# Patient Record
Sex: FEMALE | Race: WHITE | NOT HISPANIC OR LATINO | URBAN - METROPOLITAN AREA
[De-identification: names, ages, dates, MRNs, and addresses within clinical notes are randomized per-mention and may not be internally consistent; named-entity substitution may affect disease eponyms.]

---

## 2017-01-03 DIAGNOSIS — N18.6 END STAGE RENAL DISEASE (H): ICD-10-CM

## 2017-01-03 DIAGNOSIS — Z01.810 PRE-OPERATIVE CARDIOVASCULAR EXAMINATION: ICD-10-CM

## 2017-01-03 DIAGNOSIS — Z76.82 ORGAN TRANSPLANT CANDIDATE: Primary | ICD-10-CM

## 2017-01-04 DIAGNOSIS — Z76.82 ORGAN TRANSPLANT CANDIDATE: ICD-10-CM

## 2017-01-10 LAB — PRA SINGLE ANTIGEN IGG ANTIBODY: NORMAL

## 2017-01-25 ENCOUNTER — TELEPHONE (OUTPATIENT)
Dept: TRANSPLANT | Facility: CLINIC | Age: 52
End: 2017-01-25

## 2017-01-25 NOTE — Clinical Note
January 25, 2017    Angelica Romero  [address] [place] [address]      Dear Ms. Romero,    Enclosed you will find a copy of your transplant waitlist appointment schedule and a map to our location.    If you are unable to come in for your appointments for any reason, please contact your Transplant Coordinator or Transplant ; numbers for both are on your schedule.      Sincerely,       [facility]    CC: WALDEMAR Morin, LANCE                                        [facility]  [address] [place] [address]    WAITLIST CLINIC APPOINTMENTS    Patient   Angelica Romero  MR#:    [id number]  Coordinator:  Smita RADFORD     [phone]  BRIEN:    Yulissa VILLATORO     [phone]  :  Sonam     [phone]  Location:   Transplant Center  Dates:   April 13, 2017      Day/Date:  Thursday, April 13, 2017  Time Location Activity   11:00 a.m. Transplant Center Consult Room  (3rd Floor [facility]) Appointment with Samantha Markham,  Transplant    12:00 p.m. Cardiovascular Center  (3rd Floor [facility]) Appointment with Dr. Bazan,  Cardiology   1:30 p.m. [facility] Waiting Room  (2nd floor [facility]) Lexiscan Stress Test - NPO 3 HOURS and NO CAFFINE 12 HOURS, please read attach prep sheet!

## 2017-02-07 ENCOUNTER — ORGAN (OUTPATIENT)
Dept: TRANSPLANT | Facility: CLINIC | Age: 52
End: 2017-02-07

## 2017-02-07 ENCOUNTER — RESULTS ONLY (OUTPATIENT)
Dept: OTHER | Facility: CLINIC | Age: 52
End: 2017-02-07

## 2017-02-07 ENCOUNTER — DOCUMENTATION ONLY (OUTPATIENT)
Dept: TRANSPLANT | Facility: CLINIC | Age: 52
End: 2017-02-07

## 2017-02-07 ENCOUNTER — HOSPITAL ENCOUNTER (OUTPATIENT)
Facility: CLINIC | Age: 52
Setting detail: SPECIMEN
DRG: 652 | End: 2017-02-07
Attending: TRANSPLANT SURGERY | Admitting: SURGERY
Payer: MEDICARE

## 2017-02-07 DIAGNOSIS — Z76.82 AWAITING ORGAN TRANSPLANT: Primary | ICD-10-CM

## 2017-02-07 PROCEDURE — 86832 HLA CLASS I HIGH DEFIN QUAL: CPT | Performed by: TRANSPLANT SURGERY

## 2017-02-07 PROCEDURE — 86825 HLA X-MATH NON-CYTOTOXIC: CPT | Performed by: TRANSPLANT SURGERY

## 2017-02-07 PROCEDURE — 86833 HLA CLASS II HIGH DEFIN QUAL: CPT | Performed by: TRANSPLANT SURGERY

## 2017-02-07 NOTE — Clinical Note
Pt likely to become primary. Final allo XM negative. Patient to be NPO after midnight. Donor OR 0300. Will call patient with update if she becomes primary. Read note for more details. Thx!

## 2017-02-07 NOTE — PROGRESS NOTES
PATHOLOGY HLA CROSSMATCH CONSULTATION: DONOR/RECIPIENT VIRTUAL CROSSMATCH - Kidney  Consultation Date: 2017  Consultation Requested by: Dr. Benites  Regarding: Compatibility of  donor organ UNOS #[id number]  from OPO/Hospital: [facility], [place] with patient Angelica Romero       Findings: Regarding a virtual crossmatch between Angelica Romero and  donor listed above (match ID [id number]):  The most recent and peak patient sera were analyzed.  The patient has 2 donor-specific antibody(ies)  (DSA) as listed in table below. No other antibodies listed with specificity against donor organ.      ANTIBODY MOST RECENT SERUM (mfi) 12.17.16 Peak Serum #1 (mfi)  1.6.15     A*11:02 55025 67120   DRB1*12:02 allele specific 81106  -     Donor is , A*11:02 is an  antigen. The antibodies against A11 can be real. A*11:02 bead is also known to give false positive results in the antibody assay. In this patient virtual crossmatch cannot determine if this antibody is real or not. However, crossmatch assay will help to deteremine this difference.    Record Review Indicates: I personally reviewed the most recent serum and the  peak serum/sera.  In addition, I analyzed 8 more sera:  The patient has antibodies against the donor organ.   Based on historical data from this [facility]'s histocompatibility lab, using the sum mfi of the patient's DSA to antibodies with specificity against this donor organ, the probability of a positive B cell crossmatch is  83% for the most recent serum and for the peak serum.  DSA to C-locus antigens were not considered in deriving the probability of positive crossmatch because DSA to C-locus antigens rarely contribute to a positive lymphocyte crossmatch test.    The results of this virtual XM are:   -most recent serum: Perform a physical crossmatch to determine compatibility  -peak #1:  Perform a physical crossmatch to determine compatibility    Disclaimer: Clinical judgement  must take into account other factors, such as non-HLA antibodies not detected in the assay.   The VXM gives probabilities only.  The probability does not account for the potential for auto-antibodies that may be present in the patient's serum.  These autoantibodies may render the physical crossmatch falsely positive, and would be detected by an autologous crossmatch.  When possible, confirm findings with a prospective allogeneic and autologous flow crossmatches before going to transplant.    Lennie Molina MD  Medical Director, Immunology/Histocompatibility Laboratory

## 2017-02-08 ENCOUNTER — ANESTHESIA EVENT (OUTPATIENT)
Dept: SURGERY | Facility: CLINIC | Age: 52
DRG: 652 | End: 2017-02-08
Payer: MEDICARE

## 2017-02-08 ENCOUNTER — ANESTHESIA (OUTPATIENT)
Dept: SURGERY | Facility: CLINIC | Age: 52
DRG: 652 | End: 2017-02-08
Payer: MEDICARE

## 2017-02-08 ENCOUNTER — APPOINTMENT (OUTPATIENT)
Dept: GENERAL RADIOLOGY | Facility: CLINIC | Age: 52
DRG: 652 | End: 2017-02-08
Attending: ANESTHESIOLOGY
Payer: MEDICARE

## 2017-02-08 ENCOUNTER — HOSPITAL ENCOUNTER (INPATIENT)
Facility: CLINIC | Age: 52
LOS: 3 days | Discharge: HOME HEALTH | DRG: 652 | End: 2017-02-11
Attending: SURGERY | Admitting: SURGERY
Payer: MEDICARE

## 2017-02-08 ENCOUNTER — SURGERY (OUTPATIENT)
Age: 52
End: 2017-02-08

## 2017-02-08 ENCOUNTER — APPOINTMENT (OUTPATIENT)
Dept: GENERAL RADIOLOGY | Facility: CLINIC | Age: 52
DRG: 652 | End: 2017-02-08
Attending: PHYSICIAN ASSISTANT
Payer: MEDICARE

## 2017-02-08 DIAGNOSIS — Z22.7 LATENT TUBERCULOSIS BY BLOOD TEST: ICD-10-CM

## 2017-02-08 DIAGNOSIS — Z94.0 DECEASED-DONOR KIDNEY TRANSPLANT: Primary | ICD-10-CM

## 2017-02-08 DIAGNOSIS — I10 ESSENTIAL HYPERTENSION: ICD-10-CM

## 2017-02-08 LAB
ABO + RH BLD: NORMAL
ABO + RH BLD: NORMAL
ALBUMIN SERPL-MCNC: 3.6 G/DL (ref 3.4–5)
ALP SERPL-CCNC: 56 U/L (ref 40–150)
ALT SERPL W P-5'-P-CCNC: 20 U/L (ref 0–50)
ANION GAP SERPL CALCULATED.3IONS-SCNC: 13 MMOL/L (ref 3–14)
ANION GAP SERPL CALCULATED.3IONS-SCNC: 9 MMOL/L (ref 3–14)
APTT PPP: 28 SEC (ref 22–37)
AST SERPL W P-5'-P-CCNC: 11 U/L (ref 0–45)
BASE EXCESS BLDA CALC-SCNC: 1.5 MMOL/L
BASOPHILS # BLD AUTO: 0 10E9/L (ref 0–0.2)
BASOPHILS NFR BLD AUTO: 0.3 %
BILIRUB SERPL-MCNC: 0.3 MG/DL (ref 0.2–1.3)
BLD GP AB SCN SERPL QL: NORMAL
BLD PROD TYP BPU: NORMAL
BLOOD BANK CMNT PATIENT-IMP: NORMAL
BUN SERPL-MCNC: 21 MG/DL (ref 7–30)
BUN SERPL-MCNC: 26 MG/DL (ref 7–30)
CA-I BLD-MCNC: 4.5 MG/DL (ref 4.4–5.2)
CALCIUM SERPL-MCNC: 7.8 MG/DL (ref 8.5–10.1)
CALCIUM SERPL-MCNC: 9.4 MG/DL (ref 8.5–10.1)
CHLORIDE SERPL-SCNC: 102 MMOL/L (ref 94–109)
CHLORIDE SERPL-SCNC: 106 MMOL/L (ref 94–109)
CHOLEST SERPL-MCNC: 231 MG/DL
CO2 SERPL-SCNC: 22 MMOL/L (ref 20–32)
CO2 SERPL-SCNC: 30 MMOL/L (ref 20–32)
CREAT SERPL-MCNC: 4.89 MG/DL (ref 0.52–1.04)
CREAT SERPL-MCNC: 5.59 MG/DL (ref 0.52–1.04)
DIFFERENTIAL METHOD BLD: ABNORMAL
EOSINOPHIL # BLD AUTO: 0.1 10E9/L (ref 0–0.7)
EOSINOPHIL NFR BLD AUTO: 1.8 %
ERYTHROCYTE [DISTWIDTH] IN BLOOD BY AUTOMATED COUNT: 14 % (ref 10–15)
ERYTHROCYTE [DISTWIDTH] IN BLOOD BY AUTOMATED COUNT: 14.4 % (ref 10–15)
GFR SERPL CREATININE-BSD FRML MDRD: 8 ML/MIN/1.7M2
GFR SERPL CREATININE-BSD FRML MDRD: 9 ML/MIN/1.7M2
GLUCOSE BLD-MCNC: 111 MG/DL (ref 70–99)
GLUCOSE SERPL-MCNC: 122 MG/DL (ref 70–99)
GLUCOSE SERPL-MCNC: 76 MG/DL (ref 70–99)
HBA1C MFR BLD: 4 % (ref 4.3–6)
HBV CORE IGM SERPL QL IA: NORMAL
HBV SURFACE AG SERPL QL IA: NONREACTIVE
HCO3 BLD-SCNC: 26 MMOL/L (ref 21–28)
HCT VFR BLD AUTO: 31 % (ref 35–47)
HCT VFR BLD AUTO: 34 % (ref 35–47)
HCV AB SERPL QL IA: NORMAL
HDLC SERPL-MCNC: 49 MG/DL
HGB BLD-MCNC: 10.3 G/DL (ref 11.7–15.7)
HGB BLD-MCNC: 10.3 G/DL (ref 11.7–15.7)
HGB BLD-MCNC: 11.4 G/DL (ref 11.7–15.7)
HIV 1+2 AB+HIV1 P24 AG SERPL QL IA: NORMAL
HLA FINAL CROSSMATCH RECIPIENT: NORMAL
IMM GRANULOCYTES # BLD: 0 10E9/L (ref 0–0.4)
IMM GRANULOCYTES NFR BLD: 0.1 %
INR PPP: 1.13 (ref 0.86–1.14)
LDLC SERPL CALC-MCNC: 111 MG/DL
LYMPHOCYTES # BLD AUTO: 1.1 10E9/L (ref 0.8–5.3)
LYMPHOCYTES NFR BLD AUTO: 16 %
MAGNESIUM SERPL-MCNC: 1.6 MG/DL (ref 1.6–2.3)
MCH RBC QN AUTO: 32.8 PG (ref 26.5–33)
MCH RBC QN AUTO: 33.1 PG (ref 26.5–33)
MCHC RBC AUTO-ENTMCNC: 33.2 G/DL (ref 31.5–36.5)
MCHC RBC AUTO-ENTMCNC: 33.5 G/DL (ref 31.5–36.5)
MCV RBC AUTO: 100 FL (ref 78–100)
MCV RBC AUTO: 98 FL (ref 78–100)
MONOCYTES # BLD AUTO: 0.5 10E9/L (ref 0–1.3)
MONOCYTES NFR BLD AUTO: 6.7 %
NEUTROPHILS # BLD AUTO: 5.1 10E9/L (ref 1.6–8.3)
NEUTROPHILS NFR BLD AUTO: 75.1 %
NONHDLC SERPL-MCNC: 182 MG/DL
NRBC # BLD AUTO: 0.1 10*3/UL
NRBC BLD AUTO-RTO: 1 /100
NUM BPU REQUESTED: 2
O2/TOTAL GAS SETTING VFR VENT: 50 %
PCO2 BLD: 37 MM HG (ref 35–45)
PH BLD: 7.45 PH (ref 7.35–7.45)
PHOSPHATE SERPL-MCNC: 3.9 MG/DL (ref 2.5–4.5)
PLATELET # BLD AUTO: 150 10E9/L (ref 150–450)
PLATELET # BLD AUTO: 289 10E9/L (ref 150–450)
PO2 BLD: 115 MM HG (ref 80–105)
POTASSIUM BLD-SCNC: 3.8 MMOL/L (ref 3.4–5.3)
POTASSIUM SERPL-SCNC: 3.7 MMOL/L (ref 3.4–5.3)
POTASSIUM SERPL-SCNC: 4.2 MMOL/L (ref 3.4–5.3)
PROT SERPL-MCNC: 7.5 G/DL (ref 6.8–8.8)
RBC # BLD AUTO: 3.11 10E12/L (ref 3.8–5.2)
RBC # BLD AUTO: 3.48 10E12/L (ref 3.8–5.2)
SODIUM BLD-SCNC: 142 MMOL/L (ref 133–144)
SODIUM SERPL-SCNC: 140 MMOL/L (ref 133–144)
SODIUM SERPL-SCNC: 141 MMOL/L (ref 133–144)
SPECIMEN EXP DATE BLD: NORMAL
TRIGL SERPL-MCNC: 356 MG/DL
WBC # BLD AUTO: 3.6 10E9/L (ref 4–11)
WBC # BLD AUTO: 6.8 10E9/L (ref 4–11)

## 2017-02-08 PROCEDURE — 36000064 ZZH SURGERY LEVEL 4 EA 15 ADDTL MIN - UMMC: Performed by: SURGERY

## 2017-02-08 PROCEDURE — 25000125 ZZHC RX 250: Performed by: SURGERY

## 2017-02-08 PROCEDURE — 84295 ASSAY OF SERUM SODIUM: CPT | Performed by: SURGERY

## 2017-02-08 PROCEDURE — 82803 BLOOD GASES ANY COMBINATION: CPT | Performed by: SURGERY

## 2017-02-08 PROCEDURE — 86665 EPSTEIN-BARR CAPSID VCA: CPT | Performed by: PHYSICIAN ASSISTANT

## 2017-02-08 PROCEDURE — 82330 ASSAY OF CALCIUM: CPT | Performed by: SURGERY

## 2017-02-08 PROCEDURE — 80061 LIPID PANEL: CPT | Performed by: PHYSICIAN ASSISTANT

## 2017-02-08 PROCEDURE — 40000275 ZZH STATISTIC RCP TIME EA 10 MIN

## 2017-02-08 PROCEDURE — 86803 HEPATITIS C AB TEST: CPT | Performed by: PHYSICIAN ASSISTANT

## 2017-02-08 PROCEDURE — 25000565 ZZH ISOFLURANE, EA 15 MIN: Performed by: SURGERY

## 2017-02-08 PROCEDURE — 12000006 ZZH R&B IMCU INTERMEDIATE UMMC

## 2017-02-08 PROCEDURE — 86900 BLOOD TYPING SEROLOGIC ABO: CPT | Performed by: PHYSICIAN ASSISTANT

## 2017-02-08 PROCEDURE — 27210794 ZZH OR GENERAL SUPPLY STERILE: Performed by: SURGERY

## 2017-02-08 PROCEDURE — 40000940 XR CHEST PORT 1 VW

## 2017-02-08 PROCEDURE — 86850 RBC ANTIBODY SCREEN: CPT | Performed by: PHYSICIAN ASSISTANT

## 2017-02-08 PROCEDURE — 71000014 ZZH RECOVERY PHASE 1 LEVEL 2 FIRST HR: Performed by: SURGERY

## 2017-02-08 PROCEDURE — 25800025 ZZH RX 258: Performed by: NURSE ANESTHETIST, CERTIFIED REGISTERED

## 2017-02-08 PROCEDURE — 83036 HEMOGLOBIN GLYCOSYLATED A1C: CPT | Performed by: PHYSICIAN ASSISTANT

## 2017-02-08 PROCEDURE — 93010 ELECTROCARDIOGRAM REPORT: CPT | Performed by: INTERNAL MEDICINE

## 2017-02-08 PROCEDURE — 30243S1 TRANSFUSION OF NONAUTOLOGOUS GLOBULIN INTO CENTRAL VEIN, PERCUTANEOUS APPROACH: ICD-10-PCS | Performed by: SURGERY

## 2017-02-08 PROCEDURE — 86644 CMV ANTIBODY: CPT | Performed by: PHYSICIAN ASSISTANT

## 2017-02-08 PROCEDURE — 25000128 H RX IP 250 OP 636: Performed by: NURSE ANESTHETIST, CERTIFIED REGISTERED

## 2017-02-08 PROCEDURE — 82947 ASSAY GLUCOSE BLOOD QUANT: CPT | Performed by: SURGERY

## 2017-02-08 PROCEDURE — 0TY10Z0 TRANSPLANTATION OF LEFT KIDNEY, ALLOGENEIC, OPEN APPROACH: ICD-10-PCS | Performed by: SURGERY

## 2017-02-08 PROCEDURE — 25000125 ZZHC RX 250: Performed by: TRANSPLANT SURGERY

## 2017-02-08 PROCEDURE — 85610 PROTHROMBIN TIME: CPT | Performed by: PHYSICIAN ASSISTANT

## 2017-02-08 PROCEDURE — 87340 HEPATITIS B SURFACE AG IA: CPT | Performed by: PHYSICIAN ASSISTANT

## 2017-02-08 PROCEDURE — 86901 BLOOD TYPING SEROLOGIC RH(D): CPT | Performed by: PHYSICIAN ASSISTANT

## 2017-02-08 PROCEDURE — 37000009 ZZH ANESTHESIA TECHNICAL FEE, EACH ADDTL 15 MIN: Performed by: SURGERY

## 2017-02-08 PROCEDURE — 83735 ASSAY OF MAGNESIUM: CPT | Performed by: TRANSPLANT SURGERY

## 2017-02-08 PROCEDURE — 37000008 ZZH ANESTHESIA TECHNICAL FEE, 1ST 30 MIN: Performed by: SURGERY

## 2017-02-08 PROCEDURE — 36415 COLL VENOUS BLD VENIPUNCTURE: CPT | Performed by: PHYSICIAN ASSISTANT

## 2017-02-08 PROCEDURE — 40000196 ZZH STATISTIC RAPCV CVP MONITORING

## 2017-02-08 PROCEDURE — 71020 XR CHEST 2 VW: CPT

## 2017-02-08 PROCEDURE — 85027 COMPLETE CBC AUTOMATED: CPT | Performed by: TRANSPLANT SURGERY

## 2017-02-08 PROCEDURE — 25000125 ZZHC RX 250: Performed by: NURSE ANESTHETIST, CERTIFIED REGISTERED

## 2017-02-08 PROCEDURE — 25000128 H RX IP 250 OP 636: Performed by: STUDENT IN AN ORGANIZED HEALTH CARE EDUCATION/TRAINING PROGRAM

## 2017-02-08 PROCEDURE — 86923 COMPATIBILITY TEST ELECTRIC: CPT | Performed by: PHYSICIAN ASSISTANT

## 2017-02-08 PROCEDURE — 80053 COMPREHEN METABOLIC PANEL: CPT | Performed by: PHYSICIAN ASSISTANT

## 2017-02-08 PROCEDURE — 86705 HEP B CORE ANTIBODY IGM: CPT | Performed by: PHYSICIAN ASSISTANT

## 2017-02-08 PROCEDURE — 86645 CMV ANTIBODY IGM: CPT | Performed by: PHYSICIAN ASSISTANT

## 2017-02-08 PROCEDURE — 71000015 ZZH RECOVERY PHASE 1 LEVEL 2 EA ADDTL HR: Performed by: SURGERY

## 2017-02-08 PROCEDURE — 25000125 ZZHC RX 250: Performed by: PHYSICIAN ASSISTANT

## 2017-02-08 PROCEDURE — 87389 HIV-1 AG W/HIV-1&-2 AB AG IA: CPT | Performed by: PHYSICIAN ASSISTANT

## 2017-02-08 PROCEDURE — 93005 ELECTROCARDIOGRAM TRACING: CPT

## 2017-02-08 PROCEDURE — 85025 COMPLETE CBC W/AUTO DIFF WBC: CPT | Performed by: PHYSICIAN ASSISTANT

## 2017-02-08 PROCEDURE — 81200002 ZZH ACQUISITION KIDNEY CADAVER

## 2017-02-08 PROCEDURE — 80048 BASIC METABOLIC PNL TOTAL CA: CPT | Performed by: TRANSPLANT SURGERY

## 2017-02-08 PROCEDURE — 27210995 ZZH RX 272: Performed by: TRANSPLANT SURGERY

## 2017-02-08 PROCEDURE — 84132 ASSAY OF SERUM POTASSIUM: CPT | Performed by: SURGERY

## 2017-02-08 PROCEDURE — 25000128 H RX IP 250 OP 636: Performed by: TRANSPLANT SURGERY

## 2017-02-08 PROCEDURE — 85730 THROMBOPLASTIN TIME PARTIAL: CPT | Performed by: PHYSICIAN ASSISTANT

## 2017-02-08 PROCEDURE — 84100 ASSAY OF PHOSPHORUS: CPT | Performed by: TRANSPLANT SURGERY

## 2017-02-08 PROCEDURE — 25000128 H RX IP 250 OP 636: Performed by: PHYSICIAN ASSISTANT

## 2017-02-08 PROCEDURE — 36000062 ZZH SURGERY LEVEL 4 1ST 30 MIN - UMMC: Performed by: SURGERY

## 2017-02-08 PROCEDURE — 40000170 ZZH STATISTIC PRE-PROCEDURE ASSESSMENT II: Performed by: SURGERY

## 2017-02-08 RX ORDER — PROPOFOL 10 MG/ML
INJECTION, EMULSION INTRAVENOUS PRN
Status: DISCONTINUED | OUTPATIENT
Start: 2017-02-08 | End: 2017-02-08

## 2017-02-08 RX ORDER — ONDANSETRON 4 MG/1
4 TABLET, ORALLY DISINTEGRATING ORAL EVERY 30 MIN PRN
Status: DISCONTINUED | OUTPATIENT
Start: 2017-02-08 | End: 2017-02-08 | Stop reason: HOSPADM

## 2017-02-08 RX ORDER — NALOXONE HYDROCHLORIDE 0.4 MG/ML
.1-.4 INJECTION, SOLUTION INTRAMUSCULAR; INTRAVENOUS; SUBCUTANEOUS
Status: DISCONTINUED | OUTPATIENT
Start: 2017-02-08 | End: 2017-02-11 | Stop reason: HOSPADM

## 2017-02-08 RX ORDER — SODIUM CHLORIDE, SODIUM LACTATE, POTASSIUM CHLORIDE, CALCIUM CHLORIDE 600; 310; 30; 20 MG/100ML; MG/100ML; MG/100ML; MG/100ML
INJECTION, SOLUTION INTRAVENOUS CONTINUOUS
Status: DISCONTINUED | OUTPATIENT
Start: 2017-02-08 | End: 2017-02-08

## 2017-02-08 RX ORDER — MYCOPHENOLATE MOFETIL 250 MG/1
750 CAPSULE ORAL
Status: DISCONTINUED | OUTPATIENT
Start: 2017-02-09 | End: 2017-02-11 | Stop reason: HOSPADM

## 2017-02-08 RX ORDER — VALGANCICLOVIR 450 MG/1
450 TABLET, FILM COATED ORAL
Status: DISCONTINUED | OUTPATIENT
Start: 2017-02-09 | End: 2017-02-11

## 2017-02-08 RX ORDER — FUROSEMIDE 10 MG/ML
INJECTION INTRAMUSCULAR; INTRAVENOUS PRN
Status: DISCONTINUED | OUTPATIENT
Start: 2017-02-08 | End: 2017-02-08

## 2017-02-08 RX ORDER — ONDANSETRON 2 MG/ML
INJECTION INTRAMUSCULAR; INTRAVENOUS PRN
Status: DISCONTINUED | OUTPATIENT
Start: 2017-02-08 | End: 2017-02-08

## 2017-02-08 RX ORDER — ONDANSETRON 2 MG/ML
4 INJECTION INTRAMUSCULAR; INTRAVENOUS EVERY 30 MIN PRN
Status: DISCONTINUED | OUTPATIENT
Start: 2017-02-08 | End: 2017-02-08 | Stop reason: HOSPADM

## 2017-02-08 RX ORDER — SULFAMETHOXAZOLE AND TRIMETHOPRIM 400; 80 MG/1; MG/1
1 TABLET ORAL DAILY
Status: DISCONTINUED | OUTPATIENT
Start: 2017-02-09 | End: 2017-02-09

## 2017-02-08 RX ORDER — MANNITOL 250 MG/ML
INJECTION, SOLUTION INTRAVENOUS PRN
Status: DISCONTINUED | OUTPATIENT
Start: 2017-02-08 | End: 2017-02-08

## 2017-02-08 RX ORDER — PROCHLORPERAZINE MALEATE 5 MG
5-10 TABLET ORAL EVERY 6 HOURS PRN
Status: DISCONTINUED | OUTPATIENT
Start: 2017-02-08 | End: 2017-02-11 | Stop reason: HOSPADM

## 2017-02-08 RX ORDER — FENTANYL CITRATE 50 UG/ML
25-50 INJECTION, SOLUTION INTRAMUSCULAR; INTRAVENOUS
Status: DISCONTINUED | OUTPATIENT
Start: 2017-02-08 | End: 2017-02-08 | Stop reason: HOSPADM

## 2017-02-08 RX ORDER — FENTANYL CITRATE 50 UG/ML
INJECTION, SOLUTION INTRAMUSCULAR; INTRAVENOUS PRN
Status: DISCONTINUED | OUTPATIENT
Start: 2017-02-08 | End: 2017-02-08

## 2017-02-08 RX ORDER — ONDANSETRON 2 MG/ML
4 INJECTION INTRAMUSCULAR; INTRAVENOUS EVERY 6 HOURS PRN
Status: DISCONTINUED | OUTPATIENT
Start: 2017-02-08 | End: 2017-02-11 | Stop reason: HOSPADM

## 2017-02-08 RX ORDER — ONDANSETRON 2 MG/ML
4 INJECTION INTRAMUSCULAR; INTRAVENOUS EVERY 30 MIN PRN
Status: CANCELLED | OUTPATIENT
Start: 2017-02-08

## 2017-02-08 RX ORDER — GLYCOPYRROLATE 0.2 MG/ML
INJECTION, SOLUTION INTRAMUSCULAR; INTRAVENOUS PRN
Status: DISCONTINUED | OUTPATIENT
Start: 2017-02-08 | End: 2017-02-08

## 2017-02-08 RX ORDER — SODIUM CHLORIDE 9 MG/ML
INJECTION, SOLUTION INTRAVENOUS CONTINUOUS PRN
Status: DISCONTINUED | OUTPATIENT
Start: 2017-02-08 | End: 2017-02-08

## 2017-02-08 RX ORDER — LIDOCAINE HYDROCHLORIDE 20 MG/ML
INJECTION, SOLUTION INFILTRATION; PERINEURAL PRN
Status: DISCONTINUED | OUTPATIENT
Start: 2017-02-08 | End: 2017-02-08

## 2017-02-08 RX ORDER — HYDROMORPHONE HYDROCHLORIDE 1 MG/ML
.3-.5 INJECTION, SOLUTION INTRAMUSCULAR; INTRAVENOUS; SUBCUTANEOUS EVERY 5 MIN PRN
Status: CANCELLED | OUTPATIENT
Start: 2017-02-08

## 2017-02-08 RX ORDER — SODIUM CHLORIDE 9 MG/ML
1000 INJECTION, SOLUTION INTRAVENOUS CONTINUOUS PRN
Status: DISCONTINUED | OUTPATIENT
Start: 2017-02-08 | End: 2017-02-09

## 2017-02-08 RX ORDER — HYDROMORPHONE HYDROCHLORIDE 1 MG/ML
.3-.5 INJECTION, SOLUTION INTRAMUSCULAR; INTRAVENOUS; SUBCUTANEOUS EVERY 5 MIN PRN
Status: DISCONTINUED | OUTPATIENT
Start: 2017-02-08 | End: 2017-02-08 | Stop reason: HOSPADM

## 2017-02-08 RX ORDER — SODIUM CHLORIDE 450 MG/100ML
INJECTION, SOLUTION INTRAVENOUS CONTINUOUS PRN
Status: DISCONTINUED | OUTPATIENT
Start: 2017-02-08 | End: 2017-02-09

## 2017-02-08 RX ORDER — LABETALOL HYDROCHLORIDE 5 MG/ML
10 INJECTION, SOLUTION INTRAVENOUS
Status: DISCONTINUED | OUTPATIENT
Start: 2017-02-08 | End: 2017-02-08 | Stop reason: HOSPADM

## 2017-02-08 RX ORDER — SODIUM CHLORIDE, SODIUM LACTATE, POTASSIUM CHLORIDE, CALCIUM CHLORIDE 600; 310; 30; 20 MG/100ML; MG/100ML; MG/100ML; MG/100ML
INJECTION, SOLUTION INTRAVENOUS CONTINUOUS PRN
Status: DISCONTINUED | OUTPATIENT
Start: 2017-02-08 | End: 2017-02-08

## 2017-02-08 RX ORDER — ONDANSETRON 4 MG/1
4 TABLET, ORALLY DISINTEGRATING ORAL EVERY 6 HOURS PRN
Status: DISCONTINUED | OUTPATIENT
Start: 2017-02-08 | End: 2017-02-11 | Stop reason: HOSPADM

## 2017-02-08 RX ORDER — ONDANSETRON 4 MG/1
4 TABLET, ORALLY DISINTEGRATING ORAL EVERY 30 MIN PRN
Status: CANCELLED | OUTPATIENT
Start: 2017-02-08

## 2017-02-08 RX ORDER — AMOXICILLIN 250 MG
1-2 CAPSULE ORAL 2 TIMES DAILY
Status: DISCONTINUED | OUTPATIENT
Start: 2017-02-09 | End: 2017-02-11 | Stop reason: HOSPADM

## 2017-02-08 RX ORDER — FUROSEMIDE 10 MG/ML
80 INJECTION INTRAMUSCULAR; INTRAVENOUS ONCE
Status: COMPLETED | OUTPATIENT
Start: 2017-02-08 | End: 2017-02-08

## 2017-02-08 RX ORDER — FUROSEMIDE 10 MG/ML
80 INJECTION INTRAMUSCULAR; INTRAVENOUS 2 TIMES DAILY
Status: DISCONTINUED | OUTPATIENT
Start: 2017-02-09 | End: 2017-02-09

## 2017-02-08 RX ORDER — PANTOPRAZOLE SODIUM 40 MG/1
40 TABLET, DELAYED RELEASE ORAL DAILY
Status: DISCONTINUED | OUTPATIENT
Start: 2017-02-09 | End: 2017-02-10

## 2017-02-08 RX ORDER — NEOSTIGMINE METHYLSULFATE 1 MG/ML
VIAL (ML) INJECTION PRN
Status: DISCONTINUED | OUTPATIENT
Start: 2017-02-08 | End: 2017-02-08

## 2017-02-08 RX ORDER — LABETALOL HYDROCHLORIDE 5 MG/ML
INJECTION, SOLUTION INTRAVENOUS PRN
Status: DISCONTINUED | OUTPATIENT
Start: 2017-02-08 | End: 2017-02-08

## 2017-02-08 RX ORDER — SODIUM CHLORIDE, SODIUM LACTATE, POTASSIUM CHLORIDE, CALCIUM CHLORIDE 600; 310; 30; 20 MG/100ML; MG/100ML; MG/100ML; MG/100ML
INJECTION, SOLUTION INTRAVENOUS CONTINUOUS
Status: CANCELLED | OUTPATIENT
Start: 2017-02-08

## 2017-02-08 RX ORDER — FENTANYL CITRATE 50 UG/ML
25-50 INJECTION, SOLUTION INTRAMUSCULAR; INTRAVENOUS
Status: CANCELLED | OUTPATIENT
Start: 2017-02-08

## 2017-02-08 RX ADMIN — FENTANYL CITRATE 100 MCG: 50 INJECTION, SOLUTION INTRAMUSCULAR; INTRAVENOUS at 20:13

## 2017-02-08 RX ADMIN — LIDOCAINE HYDROCHLORIDE 100 MG: 20 INJECTION, SOLUTION INFILTRATION; PERINEURAL at 15:35

## 2017-02-08 RX ADMIN — FENTANYL CITRATE 50 MCG: 50 INJECTION, SOLUTION INTRAMUSCULAR; INTRAVENOUS at 17:00

## 2017-02-08 RX ADMIN — FENTANYL CITRATE 50 MCG: 50 INJECTION, SOLUTION INTRAMUSCULAR; INTRAVENOUS at 19:45

## 2017-02-08 RX ADMIN — SODIUM CHLORIDE: 9 INJECTION, SOLUTION INTRAVENOUS at 15:24

## 2017-02-08 RX ADMIN — SODIUM CHLORIDE 1000 ML: 4.5 INJECTION, SOLUTION INTRAVENOUS at 21:17

## 2017-02-08 RX ADMIN — ROCURONIUM BROMIDE 30 MG: 10 INJECTION INTRAVENOUS at 16:36

## 2017-02-08 RX ADMIN — HYDROMORPHONE HYDROCHLORIDE: 10 INJECTION, SOLUTION INTRAMUSCULAR; INTRAVENOUS; SUBCUTANEOUS at 21:15

## 2017-02-08 RX ADMIN — FENTANYL CITRATE 100 MCG: 50 INJECTION, SOLUTION INTRAMUSCULAR; INTRAVENOUS at 15:35

## 2017-02-08 RX ADMIN — SODIUM CHLORIDE: 9 INJECTION, SOLUTION INTRAVENOUS at 18:33

## 2017-02-08 RX ADMIN — HYDROMORPHONE HYDROCHLORIDE 0.5 MG: 10 INJECTION, SOLUTION INTRAMUSCULAR; INTRAVENOUS; SUBCUTANEOUS at 21:13

## 2017-02-08 RX ADMIN — ROCURONIUM BROMIDE 50 MG: 10 INJECTION INTRAVENOUS at 15:37

## 2017-02-08 RX ADMIN — FUROSEMIDE 80 MG: 10 INJECTION, SOLUTION INTRAVENOUS at 21:05

## 2017-02-08 RX ADMIN — FUROSEMIDE 80 MG: 10 INJECTION, SOLUTION INTRAVENOUS at 18:50

## 2017-02-08 RX ADMIN — Medication 4 MG: at 20:23

## 2017-02-08 RX ADMIN — LABETALOL HYDROCHLORIDE 5 MG: 5 INJECTION, SOLUTION INTRAVENOUS at 19:08

## 2017-02-08 RX ADMIN — HEPARIN SODIUM 50 ML GIVEN: 1000 INJECTION, SOLUTION INTRAVENOUS; SUBCUTANEOUS at 20:05

## 2017-02-08 RX ADMIN — ROCURONIUM BROMIDE 20 MG: 10 INJECTION INTRAVENOUS at 17:54

## 2017-02-08 RX ADMIN — CEFUROXIME 1.5 G: 1.5 INJECTION, POWDER, FOR SOLUTION INTRAVENOUS at 16:00

## 2017-02-08 RX ADMIN — LABETALOL HYDROCHLORIDE 10 MG: 5 INJECTION, SOLUTION INTRAVENOUS at 15:52

## 2017-02-08 RX ADMIN — PROPOFOL 200 MG: 10 INJECTION, EMULSION INTRAVENOUS at 15:35

## 2017-02-08 RX ADMIN — FENTANYL CITRATE 50 MCG: 50 INJECTION, SOLUTION INTRAMUSCULAR; INTRAVENOUS at 18:06

## 2017-02-08 RX ADMIN — LABETALOL HYDROCHLORIDE 10 MG: 5 INJECTION, SOLUTION INTRAVENOUS at 18:26

## 2017-02-08 RX ADMIN — ONDANSETRON 4 MG: 2 INJECTION INTRAMUSCULAR; INTRAVENOUS at 20:23

## 2017-02-08 RX ADMIN — HYDROMORPHONE HYDROCHLORIDE 0.5 MG: 10 INJECTION, SOLUTION INTRAMUSCULAR; INTRAVENOUS; SUBCUTANEOUS at 21:35

## 2017-02-08 RX ADMIN — SODIUM CHLORIDE 1000 ML: 9 INJECTION, SOLUTION INTRAVENOUS at 21:17

## 2017-02-08 RX ADMIN — ROCURONIUM BROMIDE 10 MG: 10 INJECTION INTRAVENOUS at 18:50

## 2017-02-08 RX ADMIN — ROCURONIUM BROMIDE 10 MG: 10 INJECTION INTRAVENOUS at 19:34

## 2017-02-08 RX ADMIN — FENTANYL CITRATE 50 MCG: 50 INJECTION, SOLUTION INTRAMUSCULAR; INTRAVENOUS at 16:57

## 2017-02-08 RX ADMIN — SODIUM CHLORIDE 500 MG: 9 INJECTION, SOLUTION INTRAVENOUS at 16:15

## 2017-02-08 RX ADMIN — LABETALOL HYDROCHLORIDE 10 MG: 5 INJECTION, SOLUTION INTRAVENOUS at 20:14

## 2017-02-08 RX ADMIN — MIDAZOLAM HYDROCHLORIDE 2 MG: 1 INJECTION, SOLUTION INTRAMUSCULAR; INTRAVENOUS at 15:24

## 2017-02-08 RX ADMIN — DEXTROSE AND SODIUM CHLORIDE: 5; 900 INJECTION, SOLUTION INTRAVENOUS at 21:16

## 2017-02-08 RX ADMIN — GLYCOPYRROLATE 0.8 MG: 0.2 INJECTION, SOLUTION INTRAMUSCULAR; INTRAVENOUS at 20:23

## 2017-02-08 RX ADMIN — LABETALOL HYDROCHLORIDE 10 MG: 5 INJECTION, SOLUTION INTRAVENOUS at 19:56

## 2017-02-08 RX ADMIN — FENTANYL CITRATE 50 MCG: 50 INJECTION, SOLUTION INTRAMUSCULAR; INTRAVENOUS at 16:36

## 2017-02-08 RX ADMIN — DEXTROSE MONOHYDRATE 1000 MG: 50 INJECTION, SOLUTION INTRAVENOUS at 16:22

## 2017-02-08 RX ADMIN — ANTI-THYMOCYTE GLOBULIN (RABBIT) 175 MG: 5 INJECTION, POWDER, LYOPHILIZED, FOR SOLUTION INTRAVENOUS at 16:20

## 2017-02-08 RX ADMIN — FENTANYL CITRATE 100 MCG: 50 INJECTION, SOLUTION INTRAMUSCULAR; INTRAVENOUS at 15:52

## 2017-02-08 RX ADMIN — LABETALOL HYDROCHLORIDE 5 MG: 5 INJECTION, SOLUTION INTRAVENOUS at 19:11

## 2017-02-08 RX ADMIN — MANNITOL 25 G: 12.5 INJECTION, SOLUTION INTRAVENOUS at 18:51

## 2017-02-08 ASSESSMENT — ACTIVITIES OF DAILY LIVING (ADL)
BATHING: 0-->INDEPENDENT
COGNITION: 0 - NO COGNITION ISSUES REPORTED
AMBULATION: 0-->INDEPENDENT
FALL_HISTORY_WITHIN_LAST_SIX_MONTHS: NO
RETIRED_EATING: 0-->INDEPENDENT
SWALLOWING: 0-->SWALLOWS FOODS/LIQUIDS WITHOUT DIFFICULTY
TOILETING: 0-->INDEPENDENT
TRANSFERRING: 0-->INDEPENDENT
DRESS: 0-->INDEPENDENT
RETIRED_COMMUNICATION: 0-->UNDERSTANDS/COMMUNICATES WITHOUT DIFFICULTY

## 2017-02-08 NOTE — IP AVS SNAPSHOT
[address] [address]    Phone:  [phone]                                       After Visit Summary   2/8/2017    Angelica Romero    MRN: [id number]           After Visit Summary Signature Page     I have received my discharge instructions, and my questions have been answered. I have discussed any challenges I see with this plan with the nurse or doctor.    ..........................................................................................................................................  Patient/Patient Representative Signature      ..........................................................................................................................................  Patient Representative Print Name and Relationship to Patient    ..................................................               ................................................  Date                                            Time    ..........................................................................................................................................  Reviewed by Signature/Title    ...................................................              ..............................................  Date                                                            Time

## 2017-02-08 NOTE — PLAN OF CARE
Problem: Goal Outcome Summary  Goal: Goal Outcome Summary  Outcome: No Change  Pt admitted this afternoon for a pre-op kidney transplant.  and daughter at bedside. Pre-op checklist and admission started. EKG, labs and CXR done. VAD attempted PIV but was unsuccessful. Pt will need US to place PIV in OR. Consent done. Showered x1. Unable to get urine sample; pt had HD today. Belongings sent to 6B. NKA. HTN. Denies pain. NPO.

## 2017-02-08 NOTE — LETTER
Transition Communication Hand-off for Care Transitions to Next Level of Care Provider    Name: Angelica Romero  MRN #: [id number]  Primary Care Provider: Dimitrios Laboy     Primary Clinic: [address] [address]     Reason for Hospitalization:  ESRD  End stage kidney disease (H)  -donor kidney transplant  Admit Date/Time: 2017 12:58 PM  Discharge Date: Approx 17  Payor Source: Payor: MEDICARE / Plan: MEDICARE / Product Type: Medicare /           Reason for Communication Hand-off Referral:  Kidney Tranplant    Discharge Plan: Pt will stay locally until daily visits to ATC Clinic are completed.  [facility] will provide skilled nursing visits once Pt returns to her home in [place]       Concern for non-adherence with plan of care: No  Follow-up plan:  Future Appointments  Date Time Provider Department Center   3/7/2017 9:40 AM Jose Naranjo MD [facility]   3/30/2017 1:50 PM Bill Tejeda MD [facility]   2017 1:50 PM Bill Tejeda MD [facility]       Any outstanding tests or procedures:        Referrals     Future Labs/Procedures    Home care nursing referral     Comments:    [facility] #[phone]   Fax #[phone]      Pt will stay locally after discharge to go to Transplant Clinic daily, Home Care to start approx 17  Skilled Nursing visits to monitor cardiac and resp status  Monitor hydration, nutrition and bowel status  Monitor healing of incision   Instruct in medications and eval effects  Lab draws [facility] report results to Kidney Transplant Coordinator: Nereida Burnham phone #[phone]   Fax#[phone]    Your provider has ordered home care nursing services. If you have not been contacted within 2 days of your discharge please call the inpatient department phone number at [phone] .            Mack Recommendations:      Cassie Rankin RN  6B Care Coordinator #[phone]

## 2017-02-08 NOTE — ANESTHESIA PROCEDURE NOTES
Central Line Procedure Note  Staff:     Anesthesiologist:  ARIES ARANGO  Location: In OR after induction  Procedure Start/Stop Times:     patient identified, IV checked, site marked, risks and benefits discussed, informed consent, monitors and equipment checked, pre-op evaluation and at physician/surgeon's request      Correct Patient: Yes      Correct Position: Yes      Correct Site: Yes      Correct Procedure: Yes      Correct Laterality:  Yes    Site Marked:  Yes  Line Placement:     Procedure:  Central Line    Insertion laterality:  Right    Insertion site:  Internal Jugular      Maximal Sterile Barriers: All elements of maximal sterile barrier technique followed      (Maximal sterile barriers include:   Sterile gown, Sterile Gloves, Mask, Cap, Whole body draped, hand hygiene and acceptable skin prep).Skin Prep: Chloraprep         Injection Technique:  Ultrasound guided and Seldinger Technique    Sterile Ultrasound Technique:  Sterile probe cover and Sterile gel    Vein evaluated via U/S for patency/adequacy of catheter insertion and is adequate.  Using realtime U/S imaging the vein was punctured, and needle was observed entering vein on U/S      Permanent Image entered into patient's record      Local skin infiltration:  None    Catheter size:  7 Fr, 3 lumen, 20 cm    Catheter length at skin (cm):  15    Cath secured with: suture      Dressing:  Tegaderm and Biopatch    Complications:  None obvious    Blood aspirated all lumens: Yes      All Lumens Flushed: Yes      Verification method:  Placement to be verified post-op    Person verifying:  MDA covering post-op    Arterial Line Procedure Note  Staff:     Anesthesiologist:  ARIES ARANGO    Resident/CRNA:  EDSON JOSE    Arterial line performed by resident/CRNA in presence of a teaching physician    Location: In OR After Induction  Procedure Start/Stop Times:     patient identified      Correct Patient: Yes    Line Placement:      Procedure:  Arterial Line    Insertion Site:  Radial    Insertion laterality:  Right    Skin Prep: Chloraprep      Patient Prep: patient draped, mask, sterile gloves, hat and hand hygiene      Local skin infiltration:  None    Ultrasound Guided?: No      Catheter size:  20 gauge, 12 cm    Cath secured with: suture      Dressing:  Occlusive Gauze    Complications:  None obvious    Arterial waveform: Yes      IBP within 10% of NIBP: Yes    Assessment/Narrative:      Preformed by Sergo Chavez RN, SRNA with WAQAR Arenas and Dr. Gallo present.

## 2017-02-08 NOTE — LETTER
Transition Communication Hand-off for Care Transitions to Next Level of Care Provider    Name: Angelica Romero  MRN #: [id number]  Primary Care Provider: Dimitrios Laboy     Primary Clinic: [address] [address]     Reason for Hospitalization:  Transplant [Z94.9]  Admit Date/Time: 2/8/2017 12:58 PM  Discharge Date: 2/11/17  Payor Source: Payor: MEDICARE / Plan: MEDICARE / Product Type: Medicare /       Reason for Communication Hand-off Referral: Other see dx    Discharge Plan:       Concern for non-adherence with plan of care:   Y/N N  Discharge Needs Assessment:        Follow-up specialty is recommended: Yes    Follow-up plan:  Future Appointments  Date Time Provider Department Center   2/14/2017 7:00 AM [facility] SPEC INFUSION [facility]   2/14/2017 8:00 AM Thony Montes MD [facility]   2/15/2017 7:00 AM UC SPEC INFUSION INPR [facility]   2/15/2017 8:00 AM Thony Montes MD [facility]   3/7/2017 9:40 AM Joes Naranjo MD [facility]   3/30/2017 1:50 PM Bill Tejeda MD [facility]   6/8/2017 1:50 PM Bill Tejeda MD [facility]       Any outstanding tests or procedures:        Referrals     Future Labs/Procedures    Home care nursing referral     Comments:    [facility] #[phone]   Fax #[phone]      Pt will stay locally after discharge to go to Transplant Clinic daily, Home Care to start approx 2/17/17  Skilled Nursing visits to monitor cardiac and resp status  Monitor hydration, nutrition and bowel status  Monitor healing of incision   Instruct in medications and eval effects  Lab draws [facility] report results to Kidney Transplant Coordinator: Nereida Burnham phone #[phone]   Fax#[phone]  Will need urinalysis and urine culture should be drawn 3 days after last ciprofloxacin dose (2/19/2017).    Your provider has ordered home care nursing services. If you have not been contacted within 2 days of your discharge please call the  inpatient department phone number at [phone] .            Mack Recommendations:    Adult [facility] Follow-up and recommended labs and tests       LABS:   CBC, BMP, Mg, Phos and Tacrolimus levels to be drawn daily while in ATC, then every Monday, Wednesday, Friday by home health care nurse if arranged, or at an outpatient lab.   Urinalysis and urine culture should be drawn 3 days after last ciprofloxacin dose (2/19/2017).   You will need a random urine protein level drawn on 2/13/2017.   Liver function tests should be drawn every 1 month starting 3/10/2017 while patient on isoniazid.     FOLLOW UP APPOINTMENTS:   Remember to always bring an updated medication list to all appointments.     An appointment with your transplant surgeon will be scheduled for approximately 2 weeks following discharge from the hospital.  Your transplant surgeon is: Dr. Jose Naranjo M.D., M.S..   You will follow up with transplant nephrology in clinic at 1 and 3 months and then annually.   You will need follow up with Transplant Infectious Disease for latent TB.   Follow up with primary care provider in 4-8 weeks. (Pt to schedule)                Barbara Bal    AVS/Discharge Summary is the source of truth; this is a helpful guide for improved communication of patient story

## 2017-02-08 NOTE — ANESTHESIA PREPROCEDURE EVALUATION
Anesthesia Evaluation     .        ROS/MED HX    ENT/Pulmonary:  - neg pulmonary ROS   (+)VIDA risk factors hypertension, obese, , . .    Neurologic:  - neg neurologic ROS     Cardiovascular:     (+) hypertension----. : . . . :. .       METS/Exercise Tolerance:     Hematologic:  - neg hematologic  ROS       Musculoskeletal:  - neg musculoskeletal ROS       GI/Hepatic:  - neg GI/hepatic ROS       Renal/Genitourinary:     (+) chronic renal disease, type: ESRD, Pt requires dialysis, type: Hemodialysis, Pt has no history of transplant,       Endo:     (+) Obesity, .      Psychiatric:  - neg psychiatric ROS       Infectious Disease:  - neg infectious disease ROS       Malignancy:      - no malignancy   Other:    - neg other ROS         ANESTHESIA PREOP EVALUATION    Procedure: Procedure(s):  Kidney Transplant - Wound Class:     HPI: Angelica Romero is a 51 year old female who is presenting for above stated procedure. She has a history of ESRD on hemodialysis since 2011 (MWF) who has been on the transplant list for some time. She had a cancellation of previous transplant attempts.     PMHx/PSHx/ROS:  Past Medical History   Diagnosis Date     Hypertension      Obesity, unspecified      Hypertensive nephrosclerosis      Cervical dysplasia      End stage kidney disease (H)        Past Surgical History   Procedure Laterality Date     Hysterectomy  02/29/2012     Right breast lumpectomy  2/11/2016     benign       ROS as stated above    Soc Hx:   Social History   Substance Use Topics     Smoking status: Former Smoker -- 0.20 packs/day for 15 years     Types: Cigarettes     Smokeless tobacco: Never Used     Alcohol Use: Not on file       Allergies: No Known Allergies    Meds:   No prescriptions prior to admission       Current Outpatient Prescriptions   Medication Sig Dispense Refill     HYDROCHLOROTHIAZIDE PO        METOPROLOL TARTRATE PO Take 10 mg by mouth       lisinopril (PRINIVIL,ZESTRIL) 10 MG tablet Take 0.5 tablets by  mouth daily.       B Complex-C-Folic Acid (DIALYVITE) TABS Take 1 mg by mouth daily.       polyethylene glycol (MIRALAX) powder Take 17 g by mouth daily as needed.       calcium carbonate (TUMS) 500 MG chewable tablet Take 1 chew tab by mouth 3 times daily (with meals).         Physical Exam:  VS:      , Weight   Wt Readings from Last 2 Encounters:   09/26/16 88.996 kg (196 lb 3.2 oz)   12/13/16 87.2 kg (192 lb 3.9 oz)       Labs:    BMP:  Recent Labs   Lab Test  09/10/16   0428   NA  132*   POTASSIUM  3.7   CHLORIDE  94   CO2  28   BUN  30   CR  6.51*   GLC  100*   GIOVANNA  9.7     LFTs:   Recent Labs   Lab Test  09/10/16   0428   PROTTOTAL  7.6   ALBUMIN  3.8   BILITOTAL  0.6   ALKPHOS  48   AST  12   ALT  21     CBC:   Recent Labs   Lab Test  09/10/16   0428   WBC  10.9   RBC  3.58*   HGB  11.6*   HCT  34.5*   MCV  96   MCH  32.4   MCHC  33.6   RDW  13.5   PLT  249     Coags:  Recent Labs   Lab Test  09/10/16   0428   INR  1.08   PTT  28     NM Stress: 2/2/16  IMPRESSION:  1. Normal myocardial SPECT study with a summed stress score of 1. A  summed stress score of <4 is associated with an annual event rate of  0.3% and 0.5% For myocardial infarction and cardiac death,  respectively (E[place]. Circulation 1998;98:535-43).  2. No inducible ischemia was demonstrated.  3. Normal left ventricular ejection fraction of 76%.  4. No prior study available for comparison..     I have personally reviewed the examination and initial interpretation  and I agree with the findings.     SHANNA SANTIAGO MD             ECHO 1/28/2016  Interpretation Summary  Global and regional left ventricular function is normal with an EF of 55-60%.  Right ventricular function, chamber size, wall motion, and thickness are  normal.  The inferior vena cava was normal in size with preserved respiratory  variability (estimated RA pressure 3 mmHg, normal intravascular volume.)  No pericardial effusion is present.  This study was compared with the study  from 4/11/12. There has been no  change.                 Anesthesia Plan      History & Physical Review  History and physical reviewed and following examination; no interval change.    ASA Status:  3 .    NPO Status:  > 8 hours    Plan for General and ETT with Intravenous induction. Maintenance will be Balanced.    PONV prophylaxis:  Ondansetron (or other 5HT-3) and Other (See comment)  Additional equipment: 2nd IV, Central Line and Arterial Line      Postoperative Care  Postoperative pain management:  IV analgesics and Neuraxial analgesia.      Consents  Anesthetic plan, risks, benefits and alternatives discussed with:  Patient.  Use of blood products discussed: Yes.   Use of blood products discussed with Patient.  .        Plan discussed with Dr. Greene, staff Anesthesiologist     Edgard Lafleur MD  [facility]  [id number]

## 2017-02-08 NOTE — IP AVS SNAPSHOT
MRN:[id number]                      After Visit Summary   2017    Angelica Romero    MRN: [id number]           Thank you!     Thank you for choosing [facility] for your care. Our goal is always to provide you with excellent care. Hearing back from our patients is one way we can continue to improve our services. Please take a few minutes to complete the written survey that you may receive in the mail after you visit with us. Thank you!        Patient Information     Date Of Birth          1965        About your hospital stay     You were admitted on:  2017 You last received care in the:  [address] [facility]    You were discharged on:  2017        Reason for your hospital stay       You had a kidney transplant from a  donor. You did not have a ureteral stent placed during your transplant. You also had a positive Quantiferon Gold (tuberculosis test) from  that we started treating you for latent tuberculosis during this hospitalization.                  Who to Call     For medical emergencies, please call 911.  For non-urgent questions about your medical care, please call your primary care provider or clinic, [phone]  For questions related to your surgery, please call your surgery clinic        Attending Provider     Provider Specialty    Jose Naranjo MD Transplant       Primary Care Provider Office Phone # Fax #    Harry Laboy [phone]       [address] [address]        After Care Instructions     Activity       See discharge instructions.            Diet       See discharge instructions.            Discharge Instructions       Over the next 3-5 days you will be [facility] (phone [phone]) .  Your labs will be drawn just prior to your appointment between 6:30-7:00 am.  DO NOT take your medications prior to having labs drawn. Please bring all your  medications with you from home to take after labs are drawn.    LABS:  CBC, BMP, Mg, Phos and Tacrolimus levels to be drawn daily while in [facility], then every Monday, Wednesday, Friday by home health care nurse if arranged, or at an outpatient lab.   Urinalysis and urine culture should be drawn 3 days after last ciprofloxacin dose (2/19/2017).  You will need a random urine protein level drawn on 2/13/2017.  Liver function tests should be drawn every 1 month starting 3/10/2017 while patient on isoniazid.     FOLLOW UP APPOINTMENTS:  Remember to always bring an updated medication list to all appointments.     An appointment with your transplant surgeon will be scheduled for approximately 2 weeks following discharge from the hospital.  Your transplant surgeon is: Dr. Jose Naranjo M.D., M.S..  You will follow up with transplant nephrology in clinic at 1 and 3 months and then annually.   You will need follow up with Transplant Infectious Disease for latent TB.  Follow up with primary care provider in 4-8 weeks. (Pt to schedule)  Call scheduling at [phone] (option 1) if you have not heard about your appointments within 48 hours after discharge.    WHEN TO CONTACT YOUR  COORDINATOR:  Transplant Coordinator [phone]  Notify your coordinator if you have pain over your kidney, increased redness or drainage from your incision, fever greater than 100.5F, or decreased urine output.  Notify your coordinator immediately if you are ever unable to take your immunosuppressive medications for any reason.  If it is outside of office hours, please call the hospital switchboard at [phone] and ask to have the kidney transplant surgery fellow paged for urgent medical questions, or present to the emergency department.    ACTIVITY:  Walk at least four times a day, lift no greater than 10 pounds for 6-8 weeks from the time of surgery.  No driving while taking narcotics or 3 weeks after surgery.    DIET:  Diet recommendations  post-transplant: Heart healthy dietary habits long term (low saturated/trans fat, low sodium). High protein diet x 8 weeks. Practice food safety precautions.    OTHER INSTRUCTIONS:  You have staples in place. They will be removed in 3 weeks after operation at a clinic appointment.  Do not bathe/soak the incision in water for at least 6 weeks after surgery; showering is ok. You do not need to do anything special to clean the incision. You can let water/soap run over the incision in the shower and otherwise keep it clean and dry.            Monitor and record       Keep track of how much urine you are making.  Monitor blood pressure and weight daily initially post transplant.   Keep track of the levels of your labs (most important are Creatinine, Potassium, and Tacrolimus levels).   Keep track of what medications you take, how much of each medication you take, when you take the medication, and why you take the medication.                  Follow-up Appointments     Adult [facility] Follow-up and recommended labs and tests       LABS:  CBC, BMP, Mg, Phos and Tacrolimus levels to be drawn daily while in ATC, then every Monday, Wednesday, Friday by home health care nurse if arranged, or at an outpatient lab.   Urinalysis and urine culture should be drawn 3 days after last ciprofloxacin dose (2/19/2017).  You will need a random urine protein level drawn on 2/13/2017.  Liver function tests should be drawn every 1 month starting 3/10/2017 while patient on isoniazid.     FOLLOW UP APPOINTMENTS:  Remember to always bring an updated medication list to all appointments.     An appointment with your transplant surgeon will be scheduled for approximately 2 weeks following discharge from the hospital.  Your transplant surgeon is: Dr. Jose Naranjo M.D., M.S..  You will follow up with transplant nephrology in clinic at 1 and 3 months and then annually.   You will need follow up with Transplant Infectious Disease for latent TB.  Follow  up with primary care provider in 4-8 weeks. (Pt to schedule)      Appointments on [place] and/or [facility] (with [facility] or [facility] provider or service). Call [phone] if you haven't heard regarding these appointments within 7 days of discharge.                  Your next 10 appointments already scheduled     Mar 07, 2017  9:40 AM CST   (Arrive by 9:25 AM)   Kidney Post Op with Jose Naranjo MD   [facility] ([facility])    [address] [place] [id number] [phone]            Mar 30, 2017  1:50 PM CDT   (Arrive by 1:20 PM)   Return Kidney Transplant with Bill Tejeda MD   [facility] ([facility])    [address] [place] [id number] [phone]            Jun 08, 2017  1:50 PM CDT   (Arrive by 1:20 PM)   Return Kidney Transplant with Bill Tejeda MD   [facility] ([facility])    [address] [place] [id number] [phone]              Additional Services     Home care nursing referral       [facility] #[phone]   Fax #[phone]      Pt will stay locally after discharge to go to Transplant Clinic daily, Home Care to start approx 2/17/17  Skilled Nursing visits to monitor cardiac and resp status  Monitor hydration, nutrition and bowel status  Monitor healing of incision   Instruct in medications and eval effects  Lab draws [facility] report results to Kidney Transplant Coordinator: Nereida Burnham phone #[phone]   Fax#[phone]  Will need urinalysis and urine culture should be drawn 3 days after last ciprofloxacin dose (2/19/2017).      Documentation of Face to Face and Certification for Home Health Services    I certify that patient: Angelica Romero is under my care and that I, or a nurse practitioner or physician's assistant working with me, had a face-to-face encounter that meets  the physician face-to-face encounter requirements with this patient on: 2017.    This encounter with the patient was in whole, or in part, for the following medical condition, which is the primary reason for home health care: Angelica Romero is a 52 yo female with a past medical history significant for end stage kidney disease 2/2 HTN on HD MWF via left AV fistula,still made ~1 cup of urine daily. Admitted for a  donor kidney transplant on 17, NO stent placed.       I certify that, based on my findings, the following services are medically necessary home health services: Nursing.    My clinical findings support the need for the above services because: Nurse is needed: To assess s/p Hospitalization and KT after changes in medications, new immunosuppression or other medical regimen, frequent assessments and lab draws.    Further, I certify that my clinical findings support that this patient is homebound (i.e. absences from home require considerable and taxing effort and are for medical reasons or [employer] services or infrequently or of short duration when for other reasons) because: Leaving home is medically contraindicated for the following reason(s): Infection risk / immunocompromised state where it is safer for them to receive services in the home...    Based on the above findings. I certify that this patient is confined to the home and needs intermittent skilled nursing care, physical therapy and/or speech therapy.  The patient is under my care, and I have initiated the establishment of the plan of care.  This patient will be followed by a physician who will periodically review the plan of care.  Physician/Provider to provide follow up care: Harry Laboy    Attending hospital physician (the Medicare [facility]): Jose Naranjo  Physician Signature: See electronic signature associated with these discharge orders.  Date: 2/15/2017    Your provider has ordered home care nursing  "services. If you have not been contacted within 2 days of your discharge please call the inpatient department phone number at [phone] .                  Further instructions from your care team       Diet recommendations post-transplant: High protein diet x 8 weeks.  Heart healthy dietary habits long term (low saturated/trans fat, low sodium). Practice food safety precautions. See nutrition section of transplant handbook for more information.      Pending Results     No orders found from 2017 to 2017.            Statement of Approval     Ordered          17 5569  I have reviewed and agree with all the recommendations and orders detailed in this document.  EFFECTIVE NOW     Approved and electronically signed by:  Precious Khan MD             Admission Information     Date & Time Department Dept. Phone    2017 [address] [facility] [phone]      Your Vitals Were     Blood Pressure Pulse Temperature Respirations Height Weight    165/77 (BP Location: Right arm) 76 97.4  F (36.3  C) (Oral) 16 1.575 m (5' 2\") 91.9 kg (202 lb 9.6 oz)    Pulse Oximetry BMI (Body Mass Index)                98% 37.06 kg/m2          MyChart Information     [email or web address] gives you secure access to your electronic health record. If you see a primary care provider, you can also send messages to your care team and make appointments. If you have questions, please call your primary care clinic.  If you do not have a primary care provider, please call [phone] and they will assist you.        Care EveryWhere ID     This is your Care EveryWhere ID. This could be used by other organizations to access your [facility] medical records  [id number]           Review of your medicines      START taking        Dose / Directions    ciprofloxacin 500 MG tablet   Commonly known as:  CIPRO   Indication:  Urinary Tract Infection, Donor UTI   Used for:  -donor kidney transplant        Dose:  500 mg   Take 1 tablet (500 mg) by " mouth every 24 hours   Quantity:  5 tablet   Refills:  0       isoniazid 300 MG tablet   Commonly known as:  NYDRAZID   Indication:  latent TB   Used for:  Latent tuberculosis by blood test        Dose:  300 mg   Take 1 tablet (300 mg) by mouth daily Take for 9 months total. You should have your liver function tests drawn monthly while on this medication.   Quantity:  30 tablet   Refills:  8       mycophenolate 250 MG capsule   Commonly known as:  CELLCEPT - GENERIC EQUIVALENT   Used for:  -donor kidney transplant        Dose:  750 mg   Take 3 capsules (750 mg) by mouth 2 times daily   Quantity:  180 capsule   Refills:  11       ondansetron 4 MG ODT tab   Commonly known as:  ZOFRAN-ODT   Used for:  -donor kidney transplant        Dose:  4 mg   Take 1 tablet (4 mg) by mouth every 6 hours as needed for nausea   Quantity:  30 tablet   Refills:  0       oxyCODONE-acetaminophen 5-325 MG per tablet   Commonly known as:  PERCOCET   Used for:  -donor kidney transplant        Dose:  1-2 tablet   Take 1-2 tablets by mouth every 4 hours as needed for moderate to severe pain This medication contains Tylenol (acetaminophen). Do not take more than 4,000 mg of Tylenol (acetaminophen) from all sources to prevent liver damage.   Quantity:  30 tablet   Refills:  0       pyridOXINE 25 MG tablet   Commonly known as:  vitamin B-6   Used for:  Latent tuberculosis by blood test        Dose:  25 mg   Take 1 tablet (25 mg) by mouth daily   Quantity:  30 tablet   Refills:  8       senna-docusate 8.6-50 MG per tablet   Commonly known as:  SENOKOT-S;PERICOLACE   Used for:  -donor kidney transplant        Dose:  1-2 tablet   Take 1-2 tablets by mouth 2 times daily as needed for constipation   Quantity:  100 tablet   Refills:  1       sulfamethoxazole-trimethoprim 400-80 MG per tablet   Commonly known as:  BACTRIM/SEPTRA   Indication:  PCP prophylaxis   Used for:  -donor kidney transplant        Dose:  1  tablet   Take 1 tablet by mouth three times a week Take on Monday, Wednesday, and Saturday. The dose of this medication may need to be changed as your kidney function improves (ask your Nephrologist).   Quantity:  12 tablet   Refills:  11       valGANciclovir 450 MG tablet   Commonly known as:  VALCYTE   Indication:  Treatment to Prevent Cytomegalovirus Disease   Used for:  -donor kidney transplant        Dose:  450 mg   Take 1 tablet (450 mg) by mouth twice a week You should take this medication for 6 months total after your transplant. The dose of this medication may need to be changed as your kidney function improves (ask your Nephrologist).   Quantity:  8 tablet   Refills:  6         CONTINUE these medicines which may have CHANGED, or have new prescriptions. If we are uncertain of the size of tablets/capsules you have at home, strength may be listed as something that might have changed.        Dose / Directions    amLODIPine 10 MG tablet   Commonly known as:  NORVASC   This may have changed:    - medication strength  - how much to take   Used for:  Essential hypertension        Dose:  10 mg   Take 1 tablet (10 mg) by mouth daily   Quantity:  30 tablet   Refills:  11       metoprolol 50 MG tablet   Commonly known as:  LOPRESSOR   This may have changed:    - medication strength  - how much to take   Used for:  Essential hypertension        Dose:  50 mg   Take 1 tablet (50 mg) by mouth 2 times daily   Quantity:  60 tablet   Refills:  11         CONTINUE these medicines which have NOT CHANGED        Dose / Directions    MIRALAX powder   Used for:  Hypertensive nephrosclerosis, Organ transplant candidate   Generic drug:  polyethylene glycol        Dose:  17 g   Take 17 g by mouth daily as needed.   Refills:  0       TUMS 500 MG chewable tablet   Used for:  Hypertensive nephrosclerosis, Organ transplant candidate   Generic drug:  calcium carbonate        Dose:  1 chew tab   Take 1 chew tab by mouth 3 times  daily (with meals).   Refills:  0         STOP taking     DIALYVITE Tabs           HYDROCHLOROTHIAZIDE PO           lisinopril 10 MG tablet   Commonly known as:  PRINIVIL/ZESTRIL           RICARDO-DULCE PO                Where to get your medicines      These medications were sent to [facility] - [place], [place] - [address]  [address] [address]     Phone:  [phone]     amLODIPine 10 MG tablet    ciprofloxacin 500 MG tablet    isoniazid 300 MG tablet    metoprolol 50 MG tablet    mycophenolate 250 MG capsule    ondansetron 4 MG ODT tab    pyridOXINE 25 MG tablet    senna-docusate 8.6-50 MG per tablet    sulfamethoxazole-trimethoprim 400-80 MG per tablet    valGANciclovir 450 MG tablet         Some of these will need a paper prescription and others can be bought over the counter. Ask your nurse if you have questions.     Bring a paper prescription for each of these medications     oxyCODONE-acetaminophen 5-325 MG per tablet                Protect others around you: Learn how to safely use, store and throw away your medicines at www.disposemymeds.org.             Medication List: This is a list of all your medications and when to take them. Check marks below indicate your daily home schedule. Keep this list as a reference.      Medications           Morning Afternoon Evening Bedtime As Needed    amLODIPine 10 MG tablet   Commonly known as:  NORVASC   Take 1 tablet (10 mg) by mouth daily   Last time this was given:  10 mg on 2/11/2017  9:08 AM                                ciprofloxacin 500 MG tablet   Commonly known as:  CIPRO   Take 1 tablet (500 mg) by mouth every 24 hours   Last time this was given:  500 mg on 2/11/2017  9:09 AM                                isoniazid 300 MG tablet   Commonly known as:  NYDRAZID   Take 1 tablet (300 mg) by mouth daily Take for 9 months total. You should have your liver function tests drawn monthly while on this medication.   Last  time this was given:  300 mg on 2/11/2017  9:08 AM                                metoprolol 50 MG tablet   Commonly known as:  LOPRESSOR   Take 1 tablet (50 mg) by mouth 2 times daily   Last time this was given:  25 mg on 2/11/2017  9:08 AM                                MIRALAX powder   Take 17 g by mouth daily as needed.   Generic drug:  polyethylene glycol                                mycophenolate 250 MG capsule   Commonly known as:  CELLCEPT - GENERIC EQUIVALENT   Take 3 capsules (750 mg) by mouth 2 times daily   Last time this was given:  750 mg on 2/11/2017  9:08 AM                                ondansetron 4 MG ODT tab   Commonly known as:  ZOFRAN-ODT   Take 1 tablet (4 mg) by mouth every 6 hours as needed for nausea                                oxyCODONE-acetaminophen 5-325 MG per tablet   Commonly known as:  PERCOCET   Take 1-2 tablets by mouth every 4 hours as needed for moderate to severe pain This medication contains Tylenol (acetaminophen). Do not take more than 4,000 mg of Tylenol (acetaminophen) from all sources to prevent liver damage.   Last time this was given:  1 tablet on 2/11/2017  3:34 PM                                pyridOXINE 25 MG tablet   Commonly known as:  vitamin B-6   Take 1 tablet (25 mg) by mouth daily   Last time this was given:  25 mg on 2/11/2017  9:08 AM                                senna-docusate 8.6-50 MG per tablet   Commonly known as:  SENOKOT-S;PERICOLACE   Take 1-2 tablets by mouth 2 times daily as needed for constipation   Last time this was given:  2 tablets on 2/11/2017  9:08 AM                                sulfamethoxazole-trimethoprim 400-80 MG per tablet   Commonly known as:  BACTRIM/SEPTRA   Take 1 tablet by mouth three times a week Take on Monday, Wednesday, and Saturday. The dose of this medication may need to be changed as your kidney function improves (ask your Nephrologist).   Last time this was given:  1 tablet on 2/11/2017  9:09 AM                                 TUMS 500 MG chewable tablet   Take 1 chew tab by mouth 3 times daily (with meals).   Last time this was given:  500 mg on 2/10/2017  8:48 AM   Generic drug:  calcium carbonate                                valGANciclovir 450 MG tablet   Commonly known as:  VALCYTE   Take 1 tablet (450 mg) by mouth twice a week You should take this medication for 6 months total after your transplant. The dose of this medication may need to be changed as your kidney function improves (ask your Nephrologist).   Last time this was given:  450 mg on 2/9/2017  9:05 AM

## 2017-02-09 ENCOUNTER — DOCUMENTATION ONLY (OUTPATIENT)
Dept: TRANSPLANT | Facility: CLINIC | Age: 52
End: 2017-02-09

## 2017-02-09 LAB
ANION GAP SERPL CALCULATED.3IONS-SCNC: 15 MMOL/L (ref 3–14)
BASOPHILS # BLD AUTO: 0 10E9/L (ref 0–0.2)
BASOPHILS NFR BLD AUTO: 0 %
BUN SERPL-MCNC: 28 MG/DL (ref 7–30)
CALCIUM SERPL-MCNC: 8.2 MG/DL (ref 8.5–10.1)
CHLORIDE SERPL-SCNC: 102 MMOL/L (ref 94–109)
CMV IGG SERPL QL IA: NORMAL AI (ref 0–0.8)
CMV IGM SERPL QL IA: NORMAL AI (ref 0–0.8)
CO2 SERPL-SCNC: 21 MMOL/L (ref 20–32)
CREAT SERPL-MCNC: 5.98 MG/DL (ref 0.52–1.04)
CROSSMATCH RESULT: NORMAL
DIFFERENTIAL METHOD BLD: ABNORMAL
DONOR IDENTIFICATION: NORMAL
DSA COMMENTS: NORMAL
DSA PRESENT: NO
DSA TEST METHOD: NORMAL
EBV VCA IGG SER QL IA: ABNORMAL AI (ref 0–0.8)
EBV VCA IGM SER QL IA: 0.2 AI (ref 0–0.8)
EOSINOPHIL # BLD AUTO: 0 10E9/L (ref 0–0.7)
EOSINOPHIL NFR BLD AUTO: 0 %
ERYTHROCYTE [DISTWIDTH] IN BLOOD BY AUTOMATED COUNT: 14.4 % (ref 10–15)
GFR SERPL CREATININE-BSD FRML MDRD: 7 ML/MIN/1.7M2
GLUCOSE SERPL-MCNC: 193 MG/DL (ref 70–99)
HCT VFR BLD AUTO: 32.7 % (ref 35–47)
HGB BLD-MCNC: 10 G/DL (ref 11.7–15.7)
HGB BLD-MCNC: 10.6 G/DL (ref 11.7–15.7)
HGB BLD-MCNC: 10.6 G/DL (ref 11.7–15.7)
HGB BLD-MCNC: 9.3 G/DL (ref 11.7–15.7)
HGB BLD-MCNC: 9.7 G/DL (ref 11.7–15.7)
HGB BLD-MCNC: 9.9 G/DL (ref 11.7–15.7)
IMM GRANULOCYTES # BLD: 0.1 10E9/L (ref 0–0.4)
IMM GRANULOCYTES NFR BLD: 0.3 %
INTERPRETATION ECG - MUSE: NORMAL
LYMPHOCYTES # BLD AUTO: 0.2 10E9/L (ref 0.8–5.3)
LYMPHOCYTES NFR BLD AUTO: 1.4 %
MAGNESIUM SERPL-MCNC: 1.5 MG/DL (ref 1.6–2.3)
MCH RBC QN AUTO: 32.7 PG (ref 26.5–33)
MCHC RBC AUTO-ENTMCNC: 32.4 G/DL (ref 31.5–36.5)
MCV RBC AUTO: 101 FL (ref 78–100)
MONOCYTES # BLD AUTO: 0.5 10E9/L (ref 0–1.3)
MONOCYTES NFR BLD AUTO: 3.1 %
NEUTROPHILS # BLD AUTO: 16.6 10E9/L (ref 1.6–8.3)
NEUTROPHILS NFR BLD AUTO: 95.2 %
NRBC # BLD AUTO: 0 10*3/UL
NRBC BLD AUTO-RTO: 0 /100
ORGAN: NORMAL
PHOSPHATE SERPL-MCNC: 4.7 MG/DL (ref 2.5–4.5)
PLATELET # BLD AUTO: 190 10E9/L (ref 150–450)
POTASSIUM SERPL-SCNC: 3.8 MMOL/L (ref 3.4–5.3)
POTASSIUM SERPL-SCNC: 4.2 MMOL/L (ref 3.4–5.3)
POTASSIUM SERPL-SCNC: 4.3 MMOL/L (ref 3.4–5.3)
POTASSIUM SERPL-SCNC: 4.4 MMOL/L (ref 3.4–5.3)
POTASSIUM SERPL-SCNC: 4.4 MMOL/L (ref 3.4–5.3)
POTASSIUM SERPL-SCNC: 4.5 MMOL/L (ref 3.4–5.3)
RBC # BLD AUTO: 3.24 10E12/L (ref 3.8–5.2)
SA1 CELL: NORMAL
SA1 COMMENTS: NORMAL
SA1 HI RISK ABY: NORMAL
SA1 MOD RISK ABY: NORMAL
SA1 TEST METHOD: NORMAL
SA2 CELL: NORMAL
SA2 COMMENTS: NORMAL
SA2 HI RISK ABY UA: NORMAL
SA2 MOD RISK ABY: NORMAL
SA2 TEST METHOD: NORMAL
SODIUM SERPL-SCNC: 138 MMOL/L (ref 133–144)
WBC # BLD AUTO: 17.4 10E9/L (ref 4–11)

## 2017-02-09 PROCEDURE — 25000128 H RX IP 250 OP 636: Performed by: PHYSICIAN ASSISTANT

## 2017-02-09 PROCEDURE — A9270 NON-COVERED ITEM OR SERVICE: HCPCS | Mod: GY | Performed by: SURGERY

## 2017-02-09 PROCEDURE — A9270 NON-COVERED ITEM OR SERVICE: HCPCS | Mod: GY | Performed by: ANESTHESIOLOGY

## 2017-02-09 PROCEDURE — A9270 NON-COVERED ITEM OR SERVICE: HCPCS | Mod: GY | Performed by: PHYSICIAN ASSISTANT

## 2017-02-09 PROCEDURE — 36592 COLLECT BLOOD FROM PICC: CPT | Performed by: TRANSPLANT SURGERY

## 2017-02-09 PROCEDURE — 85018 HEMOGLOBIN: CPT | Performed by: TRANSPLANT SURGERY

## 2017-02-09 PROCEDURE — 25000132 ZZH RX MED GY IP 250 OP 250 PS 637: Mod: GY | Performed by: ANESTHESIOLOGY

## 2017-02-09 PROCEDURE — 25000132 ZZH RX MED GY IP 250 OP 250 PS 637: Mod: GY | Performed by: PHYSICIAN ASSISTANT

## 2017-02-09 PROCEDURE — 25000132 ZZH RX MED GY IP 250 OP 250 PS 637: Mod: GY | Performed by: SURGERY

## 2017-02-09 PROCEDURE — 12000026 ZZH R&B TRANSPLANT

## 2017-02-09 PROCEDURE — 25000125 ZZHC RX 250: Performed by: TRANSPLANT SURGERY

## 2017-02-09 PROCEDURE — 84100 ASSAY OF PHOSPHORUS: CPT | Performed by: TRANSPLANT SURGERY

## 2017-02-09 PROCEDURE — 85025 COMPLETE CBC W/AUTO DIFF WBC: CPT | Performed by: TRANSPLANT SURGERY

## 2017-02-09 PROCEDURE — 84132 ASSAY OF SERUM POTASSIUM: CPT | Performed by: TRANSPLANT SURGERY

## 2017-02-09 PROCEDURE — 25000132 ZZH RX MED GY IP 250 OP 250 PS 637: Mod: GY | Performed by: TRANSPLANT SURGERY

## 2017-02-09 PROCEDURE — 25000125 ZZHC RX 250: Performed by: SURGERY

## 2017-02-09 PROCEDURE — 83735 ASSAY OF MAGNESIUM: CPT | Performed by: TRANSPLANT SURGERY

## 2017-02-09 PROCEDURE — 25000128 H RX IP 250 OP 636: Performed by: TRANSPLANT SURGERY

## 2017-02-09 PROCEDURE — 25000131 ZZH RX MED GY IP 250 OP 636 PS 637: Mod: GY | Performed by: SURGERY

## 2017-02-09 PROCEDURE — 25000131 ZZH RX MED GY IP 250 OP 636 PS 637: Mod: GY | Performed by: TRANSPLANT SURGERY

## 2017-02-09 PROCEDURE — 25000128 H RX IP 250 OP 636: Performed by: SURGERY

## 2017-02-09 PROCEDURE — A9270 NON-COVERED ITEM OR SERVICE: HCPCS | Mod: GY | Performed by: TRANSPLANT SURGERY

## 2017-02-09 PROCEDURE — 25000130 H RX MED GY IP 250 OP 259 PS 637: Mod: GY | Performed by: PHYSICIAN ASSISTANT

## 2017-02-09 PROCEDURE — 80048 BASIC METABOLIC PNL TOTAL CA: CPT | Performed by: TRANSPLANT SURGERY

## 2017-02-09 RX ORDER — HYDROXYZINE HYDROCHLORIDE 25 MG/1
25 TABLET, FILM COATED ORAL EVERY 6 HOURS PRN
Status: DISCONTINUED | OUTPATIENT
Start: 2017-02-09 | End: 2017-02-11 | Stop reason: HOSPADM

## 2017-02-09 RX ORDER — FUROSEMIDE 10 MG/ML
40 INJECTION INTRAMUSCULAR; INTRAVENOUS
Status: DISCONTINUED | OUTPATIENT
Start: 2017-02-09 | End: 2017-02-10

## 2017-02-09 RX ORDER — DIPHENHYDRAMINE HCL 12.5MG/5ML
25-50 LIQUID (ML) ORAL ONCE
Status: COMPLETED | OUTPATIENT
Start: 2017-02-09 | End: 2017-02-09

## 2017-02-09 RX ORDER — OXYCODONE AND ACETAMINOPHEN 5; 325 MG/1; MG/1
1-2 TABLET ORAL EVERY 4 HOURS PRN
Status: DISCONTINUED | OUTPATIENT
Start: 2017-02-09 | End: 2017-02-11 | Stop reason: HOSPADM

## 2017-02-09 RX ORDER — ACETAMINOPHEN 325 MG/1
650 TABLET ORAL ONCE
Status: COMPLETED | OUTPATIENT
Start: 2017-02-09 | End: 2017-02-09

## 2017-02-09 RX ORDER — AMLODIPINE BESYLATE 5 MG/1
5 TABLET ORAL DAILY
Status: DISCONTINUED | OUTPATIENT
Start: 2017-02-09 | End: 2017-02-10

## 2017-02-09 RX ORDER — TACROLIMUS 1 MG/1
2 CAPSULE ORAL
Status: DISCONTINUED | OUTPATIENT
Start: 2017-02-09 | End: 2017-02-11

## 2017-02-09 RX ORDER — HYDROMORPHONE HCL/0.9% NACL/PF 0.2MG/0.2
0.2 SYRINGE (ML) INTRAVENOUS
Status: DISCONTINUED | OUTPATIENT
Start: 2017-02-09 | End: 2017-02-11 | Stop reason: HOSPADM

## 2017-02-09 RX ORDER — CALCIUM CARBONATE 500 MG/1
500 TABLET, CHEWABLE ORAL DAILY PRN
Status: DISCONTINUED | OUTPATIENT
Start: 2017-02-09 | End: 2017-02-11 | Stop reason: HOSPADM

## 2017-02-09 RX ORDER — DIPHENHYDRAMINE HCL 25 MG
25-50 CAPSULE ORAL ONCE
Status: COMPLETED | OUTPATIENT
Start: 2017-02-09 | End: 2017-02-09

## 2017-02-09 RX ORDER — METOPROLOL TARTRATE 25 MG/1
25 TABLET, FILM COATED ORAL 2 TIMES DAILY
Status: DISCONTINUED | OUTPATIENT
Start: 2017-02-09 | End: 2017-02-11 | Stop reason: HOSPADM

## 2017-02-09 RX ORDER — SULFAMETHOXAZOLE AND TRIMETHOPRIM 400; 80 MG/1; MG/1
1 TABLET ORAL
Status: DISCONTINUED | OUTPATIENT
Start: 2017-02-11 | End: 2017-02-11 | Stop reason: HOSPADM

## 2017-02-09 RX ORDER — TACROLIMUS 1 MG/1
2 CAPSULE, GELATIN COATED ORAL
Status: DISCONTINUED | OUTPATIENT
Start: 2017-02-09 | End: 2017-02-09

## 2017-02-09 RX ORDER — CIPROFLOXACIN 2 MG/ML
200 INJECTION, SOLUTION INTRAVENOUS EVERY 24 HOURS
Status: DISCONTINUED | OUTPATIENT
Start: 2017-02-09 | End: 2017-02-10

## 2017-02-09 RX ADMIN — PANTOPRAZOLE SODIUM 40 MG: 40 TABLET, DELAYED RELEASE ORAL at 09:05

## 2017-02-09 RX ADMIN — MAGNESIUM SULFATE IN DEXTROSE 1 G: 10 INJECTION, SOLUTION INTRAVENOUS at 10:09

## 2017-02-09 RX ADMIN — AMLODIPINE BESYLATE 5 MG: 5 TABLET ORAL at 12:19

## 2017-02-09 RX ADMIN — HYDROXYZINE HYDROCHLORIDE 25 MG: 25 TABLET ORAL at 19:31

## 2017-02-09 RX ADMIN — METHYLPREDNISOLONE SODIUM SUCCINATE 250 MG: 125 INJECTION, POWDER, LYOPHILIZED, FOR SOLUTION INTRAMUSCULAR; INTRAVENOUS at 12:17

## 2017-02-09 RX ADMIN — VALGANCICLOVIR HYDROCHLORIDE 450 MG: 450 TABLET ORAL at 09:05

## 2017-02-09 RX ADMIN — HYDROMORPHONE HYDROCHLORIDE: 10 INJECTION, SOLUTION INTRAMUSCULAR; INTRAVENOUS; SUBCUTANEOUS at 05:58

## 2017-02-09 RX ADMIN — DIPHENHYDRAMINE HYDROCHLORIDE 50 MG: 25 CAPSULE ORAL at 12:18

## 2017-02-09 RX ADMIN — SENNOSIDES AND DOCUSATE SODIUM 2 TABLET: 8.6; 5 TABLET ORAL at 19:47

## 2017-02-09 RX ADMIN — OXYCODONE HYDROCHLORIDE AND ACETAMINOPHEN 1 TABLET: 5; 325 TABLET ORAL at 15:27

## 2017-02-09 RX ADMIN — MYCOPHENOLATE MOFETIL 750 MG: 250 CAPSULE ORAL at 09:04

## 2017-02-09 RX ADMIN — FUROSEMIDE 80 MG: 10 INJECTION, SOLUTION INTRAVENOUS at 09:02

## 2017-02-09 RX ADMIN — FUROSEMIDE 80 MG: 10 INJECTION, SOLUTION INTRAVENOUS at 00:59

## 2017-02-09 RX ADMIN — DEXTROSE AND SODIUM CHLORIDE: 5; 900 INJECTION, SOLUTION INTRAVENOUS at 21:31

## 2017-02-09 RX ADMIN — ONDANSETRON 4 MG: 2 INJECTION INTRAMUSCULAR; INTRAVENOUS at 23:09

## 2017-02-09 RX ADMIN — TACROLIMUS 2 MG: 1 CAPSULE ORAL at 18:07

## 2017-02-09 RX ADMIN — FUROSEMIDE 40 MG: 10 INJECTION, SOLUTION INTRAVENOUS at 19:47

## 2017-02-09 RX ADMIN — CALCIUM CARBONATE (ANTACID) CHEW TAB 500 MG 500 MG: 500 CHEW TAB at 19:46

## 2017-02-09 RX ADMIN — MYCOPHENOLATE MOFETIL 750 MG: 250 CAPSULE ORAL at 18:08

## 2017-02-09 RX ADMIN — DEXTROSE AND SODIUM CHLORIDE: 5; 900 INJECTION, SOLUTION INTRAVENOUS at 11:52

## 2017-02-09 RX ADMIN — ONDANSETRON 4 MG: 2 INJECTION INTRAMUSCULAR; INTRAVENOUS at 01:59

## 2017-02-09 RX ADMIN — ANTI-THYMOCYTE GLOBULIN (RABBIT) 175 MG: 5 INJECTION, POWDER, LYOPHILIZED, FOR SOLUTION INTRAVENOUS at 13:23

## 2017-02-09 RX ADMIN — METOPROLOL TARTRATE 25 MG: 25 TABLET ORAL at 19:47

## 2017-02-09 RX ADMIN — ACETAMINOPHEN 650 MG: 325 TABLET, FILM COATED ORAL at 12:19

## 2017-02-09 RX ADMIN — Medication 1 MG: at 22:14

## 2017-02-09 RX ADMIN — SENNOSIDES AND DOCUSATE SODIUM 2 TABLET: 8.6; 5 TABLET ORAL at 09:05

## 2017-02-09 RX ADMIN — SULFAMETHOXAZOLE AND TRIMETHOPRIM 1 TABLET: 400; 80 TABLET ORAL at 09:05

## 2017-02-09 RX ADMIN — CIPROFLOXACIN 200 MG: 2 INJECTION INTRAVENOUS at 18:07

## 2017-02-09 RX ADMIN — METOPROLOL TARTRATE 5 MG: 5 INJECTION INTRAVENOUS at 10:02

## 2017-02-09 RX ADMIN — METOPROLOL TARTRATE 25 MG: 25 TABLET ORAL at 12:19

## 2017-02-09 RX ADMIN — OXYCODONE HYDROCHLORIDE AND ACETAMINOPHEN 1 TABLET: 5; 325 TABLET ORAL at 19:47

## 2017-02-09 ASSESSMENT — PAIN DESCRIPTION - DESCRIPTORS
DESCRIPTORS: SHARP;SORE
DESCRIPTORS: SHARP
DESCRIPTORS: SHARP

## 2017-02-09 NOTE — PLAN OF CARE
Problem: Goal Outcome Summary  Goal: Goal Outcome Summary  Outcome: Improving  Pt doing well.  Urine output btwn /hr.  No gas or BM, bowel sounds remain hypo active.  BP elevated, home meds restarted, will continue to monitor (team aware)  Other VSS.  Pt tolerating small amount of PO intake, no nausea.  Pt alert and oriented although restless and anxious at times.  Thymo running, no difficulties.  PCA d/c'd, pt declines all pain management interventions at this time.  Pt with transfer orders, awaiting bed and RN.

## 2017-02-09 NOTE — PROGRESS NOTES
Transplant :  Aware of consult per protocol for post - op kidney transplant.  SW will follow.  Plan to see patient for psychosocial assessment and completion of 2728 in next 1-2 days.

## 2017-02-09 NOTE — PROGRESS NOTES
Dr. barrera at bedside shortly after patient arrival in pacu. Relayed limited urine output expected.  Ordered 80 mg lasix iv.  Given.

## 2017-02-09 NOTE — OP NOTE
Transplant Surgery  Operative Note     Procedure date:  2017    Preoperative diagnosis:  End Stage renal failure due to Hypertension    Postoperative diagnosis:  Same,     Procedure:  1. Left kidney  transplant,   - Brain Death, Right  iliac fossa, without vascular reconstruction. A J-J ureteral stent was not placed.  2. Kidney allograft preparation on Back Table      Surgeon:  ANA LILIA FARR    Fellow/Assistant:  kassidy Benites, fellow, Felisha Coy resident .    Anesthesia:  General    Specimen:  None    Drains:  no drain    Urine output:  40 mls    Estimated blood loss:  50    Fluids administered:    Fluid Amount   Crystalloid (mL) 1500        Indication: The patient has End Stage renal failure due to Hypertension and received an organ offer for a  - Brain Death kidney allograft. After discussing the risks and benefits of proceeding, the patient agreed to proceed with surgery and provided informed consent.  Findings: Integrity of recipient artery: Mild atherosclerosis   Intraoperative Events: None    Final ABO/Crossmatch verification: After the donor organ arrived to the operating room and prior to anastomosis, I participated in the transplant pre-verification upon organ receipt timeout by visually verifying the donor ID, organ and laterality, donor blood type, recipient unique identifier, recipient blood type, and that the donor and recipient are blood type compatible. The crossmatch was done prospectively; the T cell flow crossmatch result was negative and B cell flow crossmatch result was negative prior to anastomosis.  The patient received Thymoglobulin on induction.    Donor Organ Information:   Donor UNOS ID:  [id number]    Donor arterial clamp on:  2017  5:50 AM    Total ischemic time:  797 min    Cold ischemic time:  770 min    Warm ischemic time:  27 min    Preservation fluid:  HTK      Back Table Details:   Procedure:  Bench preparation of the kidney allograft for  transplantation without vascular reconstruction    Surgeon:  ANA LILIA FARR    Faculty Co-Surgeon:  ANA LILIA FARR    Fellow/Assistant: kassidy Benites, fellow, Felisha Coy resident .    Donor arrival to recipient room:  2/8/2017  2:32 PM    Graft injury:  No    Graft biopsy: Previously done and site closed    Organ received on:  Ice    Pump resistance:      Pump flow:      Arterial anatomy:  Single    Donor arterial quality:  Mild atherosclerosis    Venous anatomy:  Single    Ureteral anatomy:  Single    Any reconstruction:  No    Artery:  none    Vein:  none     Complications: None.    Findings: Mild atherosclerosis       None.    Back Table Preparation:  The donor kidney was received and inspected. It had been flushed with HTK. The graft was prepared on the back table by removing perinephric fat and ligating venous tributaries and lymphatics. The ureter was also cleaned of excess tissue. If required, reconstruction was performed as detailed above. The kidney was stored in iced cold preservation solution until ready for transplantation. Faculty was present for the critical portions of the procedure.    Operative Procedure:   Arterial anastomosis start:  2/8/2017  6:40 PM    Arterial unclamp:  2/8/2017  7:07 PM    Extra vessels used:   None      The patient was brought to the operating room, placed in a supine position, and a time out was performed. Sequential compression devices were placed on both lower extremities and general endotracheal anesthesia was induced.  The patient was given IV antibiotics and a Reis catheter. A central line was placed by Anesthesia service. The abdomen was then shaved, prepped, and draped in the usual sterile fashion.  An incision was made in the right lower quadrant and carried down through the subcutaneous tissue and the abdominal wall fascia. If encountered, the epigastric vessels were ligated in continuity, divided and secured with surgical clips. The right iliac artery and  vein were exposed. The retractor system was placed and the lymphatics overlying the vessels were serially ligated and divided.     The patient was not heparinized. We applied atraumatic vascular clamps and the donor kidney was brought to the operative field. We made a venotomy and the renal vein was anastomosed to the recipient right External Iliac vein in an end-to-side fashion. An arteriotomy was made and the donor renal artery was anastomosed to the recipient right external iliac artery  in an end to side fashion. The patient was simultaneously loaded with IV mannitol, Lasix and volume. The renal artery was protected and the clamps were removed. After several cardiac cycles, we opened the renal artery and the kidney had Good reperfusion and was firm.    The transplant ureter was managed by creating a Liche (anterior multistitch) anastomosis with absorbable suture. No The kidney made No urine prior to implantation.    Hemostasis was obtained, the anastomoses inspected, and the kidney placed in the iliac fossa. After placement, the vessel lay was inspected and found to be acceptable. The kidney position was Retroperitoneal. The field was irrigated with antibiotic solution. No drain was placed. The retractor was removed and the abdominal wall fascia reapproximated. Subcutaneous tissues were irrigated and hemostasis obtained.  The skin was reapproximated with staples and a dry dressing was applied.   All needle, sponge and instrument counts were correct x 2. The patient was awakened, extubated, and transferred to PACU for post-op monitoring. Faculty was present for key portions of the procedure.    Physician Attestation  I was present for the key portions of the procedure and I was immediately available for the entire procedure between opening and closing.    Jose Naranjo  Date of Service (when I saw the patient): Feb 8, 2017

## 2017-02-09 NOTE — PROGRESS NOTES
Care Coordinator Progress Note     Admission Date/Time:  2/8/2017  Attending MD:  Jose Naranjo MD     Data  Chart reviewed, discussed with interdisciplinary team.   Patient was admitted for: Kidney Tranplant    Concerns with insurance coverage for discharge needs: None identifed  Current Living Situation: Patient lives with   Support System: /Family  Services Involved: None  Transportation:   Barriers to Discharge: None identified     Coordination of Care and Referrals: Provided patient/family with options for Home Care     Assessment  Pt s/p Kidney Transplant 2/8/17, anticipated discharge in 2 days. I have met with Pt to assist with discharge planning.  Prior to admission Pt was living with her  in [facility]. Pt aware of daily ATC Clinic visits starting the morning after discharge. Pt plans to stay in a Hotel for the duration of daily ATC Clinic visits.  Home Care visits starting once Pt returns home to [place] discussed which Pt is agreeable to. Pt offered choice of Home Care agencies from the Medicare.gov web site, [facility] is preferred.  I have made a referral to [facility] and faxed the face sheet and H&P.  Pt currently has no outstanding questions or concerns.     Plan  Anticipated Discharge Date:  2-3 days  Anticipated Discharge Plan:  Pt will stay at local Hot until daily ATC Clinic visits are completed                                                 Referral made to [facility] for follow up once Pt returns home.    Cassie Rankin RN   6B care coordinator #[phone]

## 2017-02-09 NOTE — PROGRESS NOTES
"POST OP CHECK  02/09/2017    Angelica Romero is a 51 year old female with h/o ESRD due to HTN now POD #0 s/p DDKT.    Patient reports anxiousness but states pain is adequately controlled at this time. No new issues. Denying chest pain, shortness of breath, nausea or emesis.     /79 mmHg  Pulse 97  Temp(Src) 98  F (36.7  C) (Oral)  Resp 18  Ht 1.575 m (5' 2\")  Wt 87 kg (191 lb 12.8 oz)  BMI 35.07 kg/m2  SpO2 96%  General: Alert, interactive, & in NAD.  Resp: CTAB, no crackles or wheezes. Non-labored breathing on 4L NC.   Cardiac: Regular rate; extremities warm.  Abdomen: Soft, obese, appropriately tender, non-distended.  Incision: c/d/i without erythema, warmth, or discharge. Minimal staining outlined on dressing.   Extremities: No LE edema or obvious joint abnormalities.    EBL 50 mL   cc/hr    A/P: No acute post-op issues. Continue plan of care per primary team. Please call with questions.     -Follow up Q4H hemoglobin and potassium     Angi Garcia MD  General Surgery PGY-1  [phone]  (After 6AM Please page primary team)      "

## 2017-02-09 NOTE — PROGRESS NOTES
Final positive donor urine and sputum culture results have been uploaded into UNOS. Donor ID [id number].  Dr. Benites notified of results.

## 2017-02-09 NOTE — ANESTHESIA CARE TRANSFER NOTE
Patient: Angelica Romero    Procedure(s):   Donor Kidney Transplant - Wound Class: II-Clean Contaminated    Diagnosis: ESRD  Diagnosis Additional Information: No value filed.    Anesthesia Type:   General, ETT     Note:  Airway :Face Mask  Patient transferred to:PACU        Vitals: (Last set prior to Anesthesia Care Transfer)    CRNA VITALS  2017 - 2017             Resp Rate (set): 10                Electronically Signed By: TAJ Basurto CRNA  2017  8:55 PM

## 2017-02-09 NOTE — PLAN OF CARE
"Problem: Goal Outcome Summary  Goal: Goal Outcome Summary  Outcome: No Change  /78 mmHg  Pulse 97  Temp(Src) 97.5  F (36.4  C) (Oral)  Resp 18  Ht 1.575 m (5' 2\")  Wt 86.637 kg (191 lb)  BMI 34.93 kg/m2  SpO2 97%    VSS, currently on 4L NC. Sinus rhythm/ sinus tachycardia. Alert and orientated X4; very anxious throughout night. Multiple requests to get up and immediatly lay back down (MD is aware of anxiety). Pain expressed at incisional site. Incision is covered and intact. PCA dilaudid running. Nausea X1 treated with IV zofran (no emesis). Urine output hourly  100-250ml. CVP 7-9. Continue to monitor and notify care team of any changes.         "

## 2017-02-09 NOTE — PROGRESS NOTES
"CLINICAL NUTRITION SERVICES - ASSESSMENT NOTE     Nutrition Prescription    Malnutrition Status:    Criteria not met    Recommendations already ordered by Registered Dietitian (RD):  Discharge diet order written.    Future/Additional Recommendations:  If continues eating poorly, would offer supplements and start on kcal cnts.    Monitor renal function for possible dietary restrictions if needed.    Consume a high protein diet x 8 weeks.  Follow heart healthy dietary habits long term (low saturated/trans fat, low sodium). Practice food safety precautions for life.       REASON FOR ASSESSMENT  Angelica Romero is a 51 year old female seen by the dietitian for MD Order- Assess & Educate post-op SOT    NUTRITION HISTORY  Pt reports following a dialysis diet at home. Reports working with an RD weekly during her HD visits. Pt reports trying to eat more protein sources. Pt stated she believes to have lost weight while following this diet. Last evaluated for transplant in 2/2016 by an RD.    CURRENT NUTRITION ORDERS  Diet: Regular  Intake/Tolerance: 25% of meals poor. Pt appears in good spirits. Has a hard boiled egg and clementines at bedside.    LABS  Labs reviewed    MEDICATIONS  Medications reviewed    ANTHROPOMETRICS  Height: 157.5 cm (5' 2\")  Most Recent Weight: 86.637 kg (191 lb) (standing )    IBW: 50 kg  BMI: Obesity Grade I BMI 30-34.9  Weight History:   Wt Readings from Last 25 Encounters:   02/09/17 86.637 kg (191 lb)   09/26/16 88.996 kg (196 lb 3.2 oz)   12/13/16 87.2 kg (192 lb 3.9 oz)   09/10/16 89.041 kg (196 lb 4.8 oz)   07/26/16 88.86 kg (195 lb 14.4 oz)   05/12/16 88.4 kg (194 lb 14.2 oz)   03/31/16 88.4 kg (194 lb 14.2 oz)   01/28/16 89.812 kg (198 lb)   01/28/16 89.948 kg (198 lb 4.8 oz)   01/25/16 88.6 kg (195 lb 5.2 oz)   12/29/15 91.763 kg (202 lb 4.8 oz)   11/18/15 88.6 kg (195 lb 5.2 oz)   10/19/15 88.6 kg (195 lb 5.2 oz)   04/11/12 84.233 kg (185 lb 11.2 oz)   04/10/12 81.647 kg (180 lb)     Dosing " Weight: 59.2 kg (adjusted weight using lowest/driest weight this admit of 86.637 kg on 2/9)    ASSESSED NUTRITION NEEDS:  Estimated Energy Needs: 6441-1373 kcals (25 - 30 Kcal/Kg)  Justification: modest needs post-op SOT  Estimated Protein Needs:  grams protein (1.3 - 2 gm/Kg)  Justification: increased needs post op SOT  Estimated Fluid Needs: 8527-9687 mL (25-30 mL/Kg)  Justification: maintenance, or per MD pending fluid status and adequate UOP    PHYSICAL FINDINGS  See malnutrition section below.    MALNUTRITION  % Intake: No decreased intake noted  % Weight Loss: Weight loss does not meet criteria  Subcutaneous Fat Loss: None observed  Muscle Loss: None observed  Fluid Accumulation/Edema: None noted  Malnutrition Diagnosis: Patient does not meet two of the above criteria necessary for diagnosing malnutrition    NUTRITION DIAGNOSIS:  Food and nutrition-related knowledge deficit r/t length of time since previous post-transplant education AEB patient verbal report, review of chart record, and MD consult for nutrition education.     INTERVENTIONS  Implementation  Nutrition Education:  1. Provided instruction on post-transplant diet with discussion regarding protein sources and high protein needs in acute post-tx phase.  Reviewed recommendations to follow low fat/low sodium diet long term and discussed heart healthy diet tips.  Discussed monitoring of K+/Phos lab values with possible need for adjustment of these in the diet as necessary. Reviewed need for food safety precautions to prevent food borne illness. Recommended continue to loose weight until closer to IBW for longevity of health.    2. Provided & reviewed handout: Post-transplant diet guidelines. Patient receptive to information provided. Expected diet compliance is good.     Goals  1. Patient will verbalize understanding of 3 important aspects of post-transplant diet guidelines.   2. PO intake >50% meals TID.    Monitoring/Evaluation  Progress toward  goals will be monitored and evaluated per protocol.    Anthony Jarvis RD, RAKEL  6B Clinical Dietitian  Pager: [phone]  [facility]COM [phone]

## 2017-02-09 NOTE — TELEPHONE ENCOUNTER
Organ Offer Encounter Information    Organ Offer Information   Organ offer date & time:  2/7/2017  2:07 PM   Coordinator/Fellow/Attending name:  LAURAANGIE   Organ(s):   Organ UNOS ID Match Run ID Comment Organ Laterality   Kidney [id number] [id number]           Recent infections?:  No    New medications?:  No Recent pregnancy?:  No   Angicoagulation medications?:  No Recent vaccinations?:  No   Recent blood transfusions?:  No Recent hospitalizations?:  No   Has your insurance changed in the last 6-12 months?:  Neg    Patient last dialyzed:  2/6/2017 10:00 AM   Dialysis type:  [facility]   Discussed organ offer with:  Patient   Discussed risk category with Patient/Other:  N/A   Understood donor criteria, verbalized understanding   Patient/Other asked to speak to a surgeon?:  No   Discussed program-specific outcomes:  Did not have questions regarding SRTR   Right to decline organ offer without penalty, Patient/Other:  Aware of option to decline without penalty   Organ offer decision status Patient/Other:  Accepted Offer   Additional Comments:  2/7/2017 2:21 PM:  Kidney: Backup  MD: Kamari  Plan: Called patient regarding backup kidney offer. Virtual XM inconclusive. Having patient come here to be drawn for a final XM. Provided instructions and contact information. Will update patient regarding offer.   Angie Tariq  Transplant Coordinator    2/7/2017 11:03 PM:  Called patient with update. Patient likely to become primary--but per Dr. Benites we would likely only be interested in RIGHT kidney pending biopsy based off results of renal US on donor. Donor OR 0300. Final allo XM negative. Informed patient. Told her to have bag packed and ready to go in the morning. Patient informed to call her at dialysis if her cell doesn't  between 6-10AM due to not having service. Dialysis number listed in the snapshot. Informed her I would call her with news if before 7 and if it was after the oncoming coordinator Angela  would call to update her. Patient will be NPO after midnight. Set recipient OR for 1000 per request of Dr. Benites-booked with Romana at [phone].  Sabrina Sanchez  Transplant Coordinator    8:01 AM  February 8, 2017  Called Ms. Romero to update.  Explained it would be +/- an hour before we have any biopsy results.  She will still be at dialysis.  Molly Duran RN, CCTC  On Call Organ Coordinator    10:35 AM  February 8, 2017  Notified Ms. Romero that we had accepted the kidney for her.  She will come to admissions and then to periop.  ETA about 1230.  Molly Duran RN, CCTC  On Call Organ Coordinator       Attestation I have discussed all of the above with the Patient/Legal Guardian/Caregiver regarding this organ offer.:  Yes   Coordinator/Fellow/Attending name:  SABRINA SANCHEZ

## 2017-02-09 NOTE — PROGRESS NOTES
2:32 AM  2017  Patient removed from the UNOS waitlist after  donor kidney transplant. OS ID is [id number].  Molly Duran RN, CCTC  On Call Organ Coordinator

## 2017-02-09 NOTE — ANESTHESIA POSTPROCEDURE EVALUATION
Patient: Angelica Romero    Procedure(s):   Donor Kidney Transplant - Wound Class: II-Clean Contaminated    Diagnosis:ESRD  Diagnosis Additional Information: No value filed.    Anesthesia Type:  General, ETT    Note:  Anesthesia Post Evaluation    Patient location during evaluation: PACU  Patient participation: Able to fully participate in evaluation  Level of consciousness: awake  Pain management: adequate  Airway patency: patent  Cardiovascular status: acceptable  Respiratory status: acceptable  Hydration status: acceptable  PONV: none     Anesthetic complications: None          Last vitals:  Filed Vitals:    17 1300 17 1458 17 2100   BP: 179/98 157/83    Pulse: 85 97    Temp: 36.8  C (98.3  F) 36.7  C (98  F) 37.3  C (99.2  F)   Resp: 18 18 18   SpO2: 100% 98%          Electronically Signed By: Mc Dickson MD  2017  9:16 PM

## 2017-02-09 NOTE — PROGRESS NOTES
Transplant Surgery  Inpatient Daily Progress Note  2017    Assessment & Plan: Agnelica Romero is a 52 yo female with a past medical history significant for end stage kidney disease 2/2 HTN on HD MWF via left AV fistula,still  made ~1 cup of urine daily.  Admitted for a  donor kidney transplant on 17, NO stent placed.    Graft function: Good, improving.  Cr 5.98 (5.6), urine output good, slightly fluctuant, but has made 1755 cc so far today.  Will straight rate fluids at 100 cc/hr.  Continue lasix at this time.  K is stable 4.4 (4.5).    Immunosuppression management: Thymo, received 175 mg intra-op, 175 mg today for POD 1.   mg BID.  Will hold off on starting tac until creatinine has improved.  Solu-Medrol 250 mg today.  Complexity of management:Medium. Contributing factors: induction  Hematology: Hgb stable, 10.0 now.  Cardiorespiratory: Stable, mildly hypertensive.  Will start norvasc 5 mg BID, start metoprolol at 25 mg BID titrate up as able.  GI/Nutrition: Regular diet as tolerated.  No nausea, will transition PCA to oral pain meds  Endocrine: Steroid induced hyperglycemia, Hgb A1c 4.0  Fluid/Electrolytes: MIVF: Will straight rate fluids at 100 cc/hr, replace magnesium 1 g  : Reis to remain due to new surgical anastomosis  Infectious disease: PPx with valcyte, bactrim.  Donor urine culture returned positive E coli, will start cipro, but will need to titrate up as renal function improves.  Donor also with kleb pneumo, also susceptible to cipro.  Prophylaxis: DVT, fall, GI, fungal  Disposition: Transfer to     Medical Decision Making: Medium  Subsequent visit 47926 (moderate level decision making)    MANOLO/Fellow/Resident Provider: Calista Faria PA-C    Faculty: Jose Naranjo M.D., M.S.  _________________________________________________________________  Transplant History: Admitted 2017 for  donor kidney transplant.  2017 (Kidney), Postoperative day: 1     Interval  "History: History is obtained from the patient  Overnight events: No acute events, Feeling well, pain is well controlled with PCA, tolerating liquids, no nausea but doesn't feel very hungry    ROS:   A 10-point review of systems was negative except as noted above.    Meds:    [START ON 2/11/2017] sulfamethoxazole-trimethoprim  1 tablet Oral Once per day on Mon Wed Sat     amLODIPine  5 mg Oral Daily     metoprolol  25 mg Oral BID     sodium chloride (PF)  10 mL Intracatheter Q8H     valGANciclovir  450 mg Oral Once per day on Mon Thu     mycophenolate  750 mg Oral BID IS     pantoprazole  40 mg Oral Daily     furosemide  80 mg Intravenous BID     senna-docusate  1-2 tablet Oral BID       Physical Exam:     Admit Weight: 87 kg (191 lb 12.8 oz)    Current vitals:   /103 mmHg  Pulse 97  Temp(Src) 97.1  F (36.2  C) (Oral)  Resp 18  Ht 1.575 m (5' 2\")  Wt 86.637 kg (191 lb)  BMI 34.93 kg/m2  SpO2 95%    CVP (mmHg): 10 mmHg    Vital sign ranges:    Temp:  [97.1  F (36.2  C)-99.2  F (37.3  C)] 97.1  F (36.2  C)  Pulse:  [97] 97  Heart Rate:  [] 96  Resp:  [15-20] 18  BP: (125-172)/() 164/103 mmHg  MAP:  [77 mmHg-118 mmHg] 77 mmHg  Arterial Line BP: (133-182)/(51-86) 133/51 mmHg  SpO2:  [92 %-99 %] 95 %  Patient Vitals for the past 24 hrs:   BP Temp Temp src Pulse Heart Rate Resp SpO2 Weight   02/09/17 1345 (!) 164/103 mmHg 97.1  F (36.2  C) Oral - 96 18 95 % -   02/09/17 1200 162/88 mmHg - - - 99 16 95 % -   02/09/17 1101 149/85 mmHg 97.4  F (36.3  C) Oral - 97 - 92 % -   02/09/17 1000 (!) 172/94 mmHg 97.7  F (36.5  C) Oral - 99 16 93 % -   02/09/17 0900 154/83 mmHg 97.7  F (36.5  C) Oral - 100 18 95 % -   02/09/17 0800 150/90 mmHg - - - 102 - 99 % -   02/09/17 0755 148/89 mmHg 97.6  F (36.4  C) Oral - 100 20 96 % -   02/09/17 0700 159/83 mmHg - - - 100 16 98 % -   02/09/17 0600 126/89 mmHg - - - 97 16 94 % -   02/09/17 0500 144/78 mmHg - - - 95 18 97 % 86.637 kg (191 lb)   02/09/17 0445 146/90 mmHg " - - - 95 18 - -   02/09/17 0400 165/88 mmHg - - - 98 18 98 % -   02/09/17 0300 155/82 mmHg 97.5  F (36.4  C) Oral - 99 18 97 % -   02/09/17 0217 156/83 mmHg - - - 95 - - -   02/09/17 0200 160/90 mmHg - - - 97 20 94 % -   02/09/17 0100 147/79 mmHg - - - 95 18 96 % -   02/09/17 0030 141/81 mmHg - - - 92 - - -   02/09/17 0000 142/81 mmHg - - - 92 20 96 % -   02/08/17 2345 143/73 mmHg - - - 90 - - -   02/08/17 2330 138/72 mmHg - - - 88 - - -   02/08/17 2315 129/75 mmHg - - - 89 - - -   02/08/17 2300 134/77 mmHg 98  F (36.7  C) Oral - 90 20 95 % -   02/08/17 2230 130/75 mmHg - - - 88 15 98 % -   02/08/17 2215 125/74 mmHg 98  F (36.7  C) Oral - 88 16 96 % -   02/08/17 2200 140/74 mmHg - - - 90 15 98 % -   02/08/17 2145 134/79 mmHg 98.1  F (36.7  C) Oral - 87 17 97 % -   02/08/17 2130 - - - - 87 18 97 % -   02/08/17 2115 148/79 mmHg - - - 89 18 93 % -   02/08/17 2100 157/81 mmHg 99.2  F (37.3  C) Oral - 91 18 99 % -   02/08/17 1458 157/83 mmHg 98  F (36.7  C) Oral 97 - 18 98 % -     General Appearance: in no apparent distress.   Skin: normal, warm  Heart: regular rate and rhythm  Lungs: nonlabored resps on RA  Abdomen: The abdomen is soft, nontender, post-op incision is covered, scant shadowing  : galvin is present.  Urine has no gross hematuria.   Extremities: edema: absent.   Neurologic: awake, alert and oriented. Tremor absent.     Data:   CMP  Recent Labs  Lab 02/09/17  0940 02/09/17  0541  02/08/17  2105 02/08/17  1714 02/08/17  1333   NA  --  138  --  140 142 141   POTASSIUM 4.4 4.5  < > 4.2 3.8 3.7   CHLORIDE  --  102  --  106  --  102   CO2  --  21  --  22  --  30   GLC  --  193*  --  122* 111* 76   BUN  --  28  --  26  --  21   CR  --  5.98*  --  5.59*  --  4.89*   GFRESTIMATED  --  7*  --  8*  --  9*   GFRESTBLACK  --  9*  --  10*  --  11*   GIOVANNA  --  8.2*  --  7.8*  --  9.4   ICAW  --   --   --   --  4.5  --    MAG  --  1.5*  --  1.6  --   --    PHOS  --  4.7*  --  3.9  --   --    ALBUMIN  --   --   --   --   --   3.6   BILITOTAL  --   --   --   --   --  0.3   ALKPHOS  --   --   --   --   --  56   AST  --   --   --   --   --  11   ALT  --   --   --   --   --  20   < > = values in this interval not displayed.  CBC  Recent Labs  Lab 02/09/17  0940 02/09/17  0541  02/08/17  2105  02/08/17  1333   HGB 10.0* 10.6*  < > 10.3*  < > 11.4*   WBC  --  17.4*  --  3.6*  --  6.8   PLT  --  190  --  150  --  289   A1C  --   --   --   --   --  4.0*   < > = values in this interval not displayed.  COAGS  Recent Labs  Lab 02/08/17  1333   INR 1.13   PTT 28      Urinalysis  Recent Labs   Lab Test  04/11/12   1151   COLOR  Yellow   APPEARANCE  Slightly Cloudy   URINEGLC  Negative   URINEBILI  Negative   URINEKETONE  Negative   SG  1.012   UBLD  Negative   URINEPH  8.0*   PROTEIN  30*   NITRITE  Negative   LEUKEST  Large*   RBCU  1   WBCU  6*     Virology:  CMV IGG ANTIBODY   Date Value Ref Range Status   04/10/2012 <0.20  Negative for anti-CMV IgG U/mL Final     EBV VCA IGG ANTIBODY   Date Value Ref Range Status   04/10/2012 >750.00  Positive, suggests immunologic exposure. U/mL Final     HEPATITIS C ANTIBODY   Date Value Ref Range Status   02/08/2017  NR Final    Nonreactive   Assay performance characteristics have not been established for newborns,   infants, and children       HEP B SURFACE JOHN   Date Value Ref Range Status   04/10/2012 2.1  Final     Comment:     Negative, No antibody detected when the value is less than 5.0 mlU/mL.

## 2017-02-09 NOTE — PHARMACY-TRANSPLANT NOTE
Adult Kidney Transplant Post Operative Note    51 year old female s/p  donor kidney transplant on 17 for ESKD due to hypertension.      Planned immunosuppression regimen per kidney transplant protocol:  INDUCTION: thymoglobulin to total ~ 6mg/kg and methylprednisolone IV daily x 3 doses used as pre-med for thymoglobulin.  MAINTENANCE:  mycophenolate and tacrolimus with goal trough levels of 8-10 mcg/L for first 6 months post-transplant.    Opportunistic pathogen prophylaxis includes: trimethoprim/sulfamethoxazole and valganciclovir.    Patient is not enrolled in medication study.    Pharmacy will monitor for medication interactions and immunosuppression levels in conjunction with the team. Medication therapy needs for discharge planning will continue to be addressed throughout the current admission via multidisciplinary rounds and order review.  Pharmacy will make recommendations as appropriate.  Juan Alberto Farias, 4th Year PharmD Student     ADDENDUM 2/10/17:  She consented to enrollment in IDS #[id number] ValACYclovir vs ValGANciclovir. She was randomized to the ValGANciclovir arm.   Karlene Mcnulty, Pharm.D., [facility]  Pager [phone]

## 2017-02-10 ENCOUNTER — DOCUMENTATION ONLY (OUTPATIENT)
Dept: TRANSPLANT | Facility: CLINIC | Age: 52
End: 2017-02-10

## 2017-02-10 ENCOUNTER — TELEPHONE (OUTPATIENT)
Dept: PHARMACY | Facility: CLINIC | Age: 52
End: 2017-02-10

## 2017-02-10 DIAGNOSIS — Z48.298 AFTERCARE FOLLOWING ORGAN TRANSPLANT: ICD-10-CM

## 2017-02-10 DIAGNOSIS — Z94.0 KIDNEY REPLACED BY TRANSPLANT: Primary | ICD-10-CM

## 2017-02-10 LAB
ANION GAP SERPL CALCULATED.3IONS-SCNC: 13 MMOL/L (ref 3–14)
BASOPHILS # BLD AUTO: 0 10E9/L (ref 0–0.2)
BASOPHILS NFR BLD AUTO: 0 %
BUN SERPL-MCNC: 47 MG/DL (ref 7–30)
CALCIUM SERPL-MCNC: 9 MG/DL (ref 8.5–10.1)
CHLORIDE SERPL-SCNC: 103 MMOL/L (ref 94–109)
CO2 SERPL-SCNC: 20 MMOL/L (ref 20–32)
CREAT SERPL-MCNC: 6.83 MG/DL (ref 0.52–1.04)
DIFFERENTIAL METHOD BLD: ABNORMAL
EOSINOPHIL # BLD AUTO: 0 10E9/L (ref 0–0.7)
EOSINOPHIL NFR BLD AUTO: 0 %
ERYTHROCYTE [DISTWIDTH] IN BLOOD BY AUTOMATED COUNT: 14.1 % (ref 10–15)
GFR SERPL CREATININE-BSD FRML MDRD: 6 ML/MIN/1.7M2
GLUCOSE SERPL-MCNC: 125 MG/DL (ref 70–99)
HCT VFR BLD AUTO: 28.4 % (ref 35–47)
HGB BLD-MCNC: 9.3 G/DL (ref 11.7–15.7)
IMM GRANULOCYTES # BLD: 0 10E9/L (ref 0–0.4)
IMM GRANULOCYTES NFR BLD: 0.1 %
LYMPHOCYTES # BLD AUTO: 0.2 10E9/L (ref 0.8–5.3)
LYMPHOCYTES NFR BLD AUTO: 2.4 %
MAGNESIUM SERPL-MCNC: 2.2 MG/DL (ref 1.6–2.3)
MCH RBC QN AUTO: 32.2 PG (ref 26.5–33)
MCHC RBC AUTO-ENTMCNC: 32.7 G/DL (ref 31.5–36.5)
MCV RBC AUTO: 98 FL (ref 78–100)
MONOCYTES # BLD AUTO: 0.2 10E9/L (ref 0–1.3)
MONOCYTES NFR BLD AUTO: 2.8 %
NEUTROPHILS # BLD AUTO: 7.1 10E9/L (ref 1.6–8.3)
NEUTROPHILS NFR BLD AUTO: 94.7 %
NRBC # BLD AUTO: 0 10*3/UL
NRBC BLD AUTO-RTO: 0 /100
PHOSPHATE SERPL-MCNC: 7.2 MG/DL (ref 2.5–4.5)
PLATELET # BLD AUTO: 118 10E9/L (ref 150–450)
POTASSIUM SERPL-SCNC: 4.3 MMOL/L (ref 3.4–5.3)
PROT UR-MCNC: 1.11 G/L
PROT/CREAT 24H UR: 2.71 G/G CR (ref 0–0.2)
RBC # BLD AUTO: 2.89 10E12/L (ref 3.8–5.2)
SODIUM SERPL-SCNC: 136 MMOL/L (ref 133–144)
TACROLIMUS BLD-MCNC: 3.5 UG/L (ref 5–15)
TME LAST DOSE: ABNORMAL H
WBC # BLD AUTO: 7.5 10E9/L (ref 4–11)

## 2017-02-10 PROCEDURE — 25000132 ZZH RX MED GY IP 250 OP 250 PS 637: Mod: GY | Performed by: PHYSICIAN ASSISTANT

## 2017-02-10 PROCEDURE — A9270 NON-COVERED ITEM OR SERVICE: HCPCS | Mod: GY | Performed by: ANESTHESIOLOGY

## 2017-02-10 PROCEDURE — 25000131 ZZH RX MED GY IP 250 OP 636 PS 637: Mod: GY | Performed by: SURGERY

## 2017-02-10 PROCEDURE — 85025 COMPLETE CBC W/AUTO DIFF WBC: CPT | Performed by: PHYSICIAN ASSISTANT

## 2017-02-10 PROCEDURE — 25000128 H RX IP 250 OP 636: Performed by: SURGERY

## 2017-02-10 PROCEDURE — 84100 ASSAY OF PHOSPHORUS: CPT | Performed by: PHYSICIAN ASSISTANT

## 2017-02-10 PROCEDURE — 25000132 ZZH RX MED GY IP 250 OP 250 PS 637: Mod: GY | Performed by: SURGERY

## 2017-02-10 PROCEDURE — 80048 BASIC METABOLIC PNL TOTAL CA: CPT | Performed by: PHYSICIAN ASSISTANT

## 2017-02-10 PROCEDURE — A9270 NON-COVERED ITEM OR SERVICE: HCPCS | Mod: GY | Performed by: PHYSICIAN ASSISTANT

## 2017-02-10 PROCEDURE — A9270 NON-COVERED ITEM OR SERVICE: HCPCS | Mod: GY | Performed by: SURGERY

## 2017-02-10 PROCEDURE — 25000131 ZZH RX MED GY IP 250 OP 636 PS 637: Mod: GY | Performed by: TRANSPLANT SURGERY

## 2017-02-10 PROCEDURE — 12000026 ZZH R&B TRANSPLANT

## 2017-02-10 PROCEDURE — 25000132 ZZH RX MED GY IP 250 OP 250 PS 637: Mod: GY | Performed by: ANESTHESIOLOGY

## 2017-02-10 PROCEDURE — 80197 ASSAY OF TACROLIMUS: CPT | Performed by: PHYSICIAN ASSISTANT

## 2017-02-10 PROCEDURE — 36592 COLLECT BLOOD FROM PICC: CPT | Performed by: PHYSICIAN ASSISTANT

## 2017-02-10 PROCEDURE — 25000132 ZZH RX MED GY IP 250 OP 250 PS 637: Mod: GY | Performed by: TRANSPLANT SURGERY

## 2017-02-10 PROCEDURE — A9270 NON-COVERED ITEM OR SERVICE: HCPCS | Mod: GY | Performed by: TRANSPLANT SURGERY

## 2017-02-10 PROCEDURE — 83735 ASSAY OF MAGNESIUM: CPT | Performed by: PHYSICIAN ASSISTANT

## 2017-02-10 PROCEDURE — 25000130 H RX MED GY IP 250 OP 259 PS 637: Mod: GY | Performed by: SURGERY

## 2017-02-10 PROCEDURE — 84156 ASSAY OF PROTEIN URINE: CPT | Performed by: SURGERY

## 2017-02-10 RX ORDER — CIPROFLOXACIN 500 MG/1
500 TABLET, FILM COATED ORAL
Status: DISCONTINUED | OUTPATIENT
Start: 2017-02-11 | End: 2017-02-11 | Stop reason: HOSPADM

## 2017-02-10 RX ORDER — CIPROFLOXACIN 250 MG/1
250 TABLET, FILM COATED ORAL EVERY 12 HOURS
Status: COMPLETED | OUTPATIENT
Start: 2017-02-10 | End: 2017-02-10

## 2017-02-10 RX ORDER — PYRIDOXINE HCL (VITAMIN B6) 25 MG
25 TABLET ORAL DAILY
Status: DISCONTINUED | OUTPATIENT
Start: 2017-02-10 | End: 2017-02-11 | Stop reason: HOSPADM

## 2017-02-10 RX ORDER — METHYLPREDNISOLONE SODIUM SUCCINATE 125 MG/2ML
100 INJECTION, POWDER, LYOPHILIZED, FOR SOLUTION INTRAMUSCULAR; INTRAVENOUS ONCE
Status: COMPLETED | OUTPATIENT
Start: 2017-02-10 | End: 2017-02-10

## 2017-02-10 RX ORDER — ACETAMINOPHEN 325 MG/1
650 TABLET ORAL ONCE
Status: COMPLETED | OUTPATIENT
Start: 2017-02-10 | End: 2017-02-10

## 2017-02-10 RX ORDER — DIPHENHYDRAMINE HCL 25 MG
25-50 CAPSULE ORAL ONCE
Status: COMPLETED | OUTPATIENT
Start: 2017-02-10 | End: 2017-02-10

## 2017-02-10 RX ORDER — AMLODIPINE BESYLATE 10 MG/1
10 TABLET ORAL DAILY
Status: DISCONTINUED | OUTPATIENT
Start: 2017-02-11 | End: 2017-02-11 | Stop reason: HOSPADM

## 2017-02-10 RX ORDER — ISONIAZID 300 MG/1
300 TABLET ORAL DAILY
Status: DISCONTINUED | OUTPATIENT
Start: 2017-02-10 | End: 2017-02-11 | Stop reason: HOSPADM

## 2017-02-10 RX ORDER — DIPHENHYDRAMINE HCL 12.5MG/5ML
25-50 LIQUID (ML) ORAL ONCE
Status: COMPLETED | OUTPATIENT
Start: 2017-02-10 | End: 2017-02-10

## 2017-02-10 RX ADMIN — TACROLIMUS 2 MG: 1 CAPSULE ORAL at 17:46

## 2017-02-10 RX ADMIN — ISONIAZID 300 MG: 300 TABLET ORAL at 20:58

## 2017-02-10 RX ADMIN — CALCIUM CARBONATE (ANTACID) CHEW TAB 500 MG 500 MG: 500 CHEW TAB at 08:48

## 2017-02-10 RX ADMIN — ACETAMINOPHEN 325 MG: 325 TABLET, FILM COATED ORAL at 13:00

## 2017-02-10 RX ADMIN — FUROSEMIDE 40 MG: 10 INJECTION, SOLUTION INTRAVENOUS at 08:36

## 2017-02-10 RX ADMIN — ACETAMINOPHEN 325 MG: 325 TABLET, FILM COATED ORAL at 11:55

## 2017-02-10 RX ADMIN — METOPROLOL TARTRATE 25 MG: 25 TABLET ORAL at 20:57

## 2017-02-10 RX ADMIN — METOPROLOL TARTRATE 25 MG: 25 TABLET ORAL at 08:36

## 2017-02-10 RX ADMIN — OXYCODONE HYDROCHLORIDE AND ACETAMINOPHEN 1 TABLET: 5; 325 TABLET ORAL at 08:35

## 2017-02-10 RX ADMIN — TACROLIMUS 2 MG: 1 CAPSULE ORAL at 08:36

## 2017-02-10 RX ADMIN — OXYCODONE HYDROCHLORIDE AND ACETAMINOPHEN 1 TABLET: 5; 325 TABLET ORAL at 22:57

## 2017-02-10 RX ADMIN — SENNOSIDES AND DOCUSATE SODIUM 2 TABLET: 8.6; 5 TABLET ORAL at 20:57

## 2017-02-10 RX ADMIN — OXYCODONE HYDROCHLORIDE AND ACETAMINOPHEN 1 TABLET: 5; 325 TABLET ORAL at 17:50

## 2017-02-10 RX ADMIN — PANTOPRAZOLE SODIUM 40 MG: 40 TABLET, DELAYED RELEASE ORAL at 08:36

## 2017-02-10 RX ADMIN — CIPROFLOXACIN HYDROCHLORIDE 250 MG: 250 TABLET, FILM COATED ORAL at 20:57

## 2017-02-10 RX ADMIN — SENNOSIDES AND DOCUSATE SODIUM 2 TABLET: 8.6; 5 TABLET ORAL at 08:36

## 2017-02-10 RX ADMIN — HYDROXYZINE HYDROCHLORIDE 25 MG: 25 TABLET ORAL at 22:26

## 2017-02-10 RX ADMIN — AMLODIPINE BESYLATE 5 MG: 5 TABLET ORAL at 08:36

## 2017-02-10 RX ADMIN — ANTI-THYMOCYTE GLOBULIN (RABBIT) 175 MG: 5 INJECTION, POWDER, LYOPHILIZED, FOR SOLUTION INTRAVENOUS at 12:36

## 2017-02-10 RX ADMIN — OXYCODONE HYDROCHLORIDE AND ACETAMINOPHEN 1 TABLET: 5; 325 TABLET ORAL at 04:40

## 2017-02-10 RX ADMIN — Medication 1 MG: at 22:57

## 2017-02-10 RX ADMIN — Medication 25 MG: at 20:58

## 2017-02-10 RX ADMIN — CIPROFLOXACIN HYDROCHLORIDE 250 MG: 250 TABLET, FILM COATED ORAL at 09:46

## 2017-02-10 RX ADMIN — DIPHENHYDRAMINE HYDROCHLORIDE 25 MG: 25 CAPSULE ORAL at 13:00

## 2017-02-10 RX ADMIN — MYCOPHENOLATE MOFETIL 750 MG: 250 CAPSULE ORAL at 08:35

## 2017-02-10 RX ADMIN — OXYCODONE HYDROCHLORIDE AND ACETAMINOPHEN 1 TABLET: 5; 325 TABLET ORAL at 11:54

## 2017-02-10 RX ADMIN — METHYLPREDNISOLONE SODIUM SUCCINATE 100 MG: 125 INJECTION, POWDER, LYOPHILIZED, FOR SOLUTION INTRAMUSCULAR; INTRAVENOUS at 11:54

## 2017-02-10 RX ADMIN — DIPHENHYDRAMINE HYDROCHLORIDE 25 MG: 25 CAPSULE ORAL at 11:54

## 2017-02-10 RX ADMIN — MYCOPHENOLATE MOFETIL 750 MG: 250 CAPSULE ORAL at 17:46

## 2017-02-10 RX ADMIN — METHYLPREDNISOLONE SODIUM SUCCINATE 100 MG: 125 INJECTION, POWDER, LYOPHILIZED, FOR SOLUTION INTRAMUSCULAR; INTRAVENOUS at 13:00

## 2017-02-10 ASSESSMENT — PAIN DESCRIPTION - DESCRIPTORS: DESCRIPTORS: SHARP

## 2017-02-10 NOTE — PLAN OF CARE
Problem: Goal Outcome Summary  Goal: Goal Outcome Summary  Outcome: Improving  Afebrile. VSS on RA. Pain tolerable with oxy q 4 hours. On regular diet, no nausea. Appetite is good. 700 ml output from galvin. No BM, is passing gas. Incision c/d/i. Creatinine increased to 6.8. Pt has latent TB, isoniazid started. Team doesn't think he needs to be in airborne isolation. Pt up with assist of 1.

## 2017-02-10 NOTE — PROGRESS NOTES
Nutrition Services Brief Progress Note - please see note from 2/9 for full assessment    Diet: regular - per patient appetite is improving. Had hard boiled egg and oatmeal for breakfast    Labs: (2/10): phos 7.2 mg/dL (H)    Interventions  1. Reviewed post transplant nutrition guidelines. Emphasized high protein x 8 weeks and food safety. Patient verbalized understanding    Unit RD to monitor per protocol  Azeb Eckert MS/RD/LD/CNSC  [address] RD Pager: [phone]

## 2017-02-10 NOTE — LETTER
OUTPATIENT LABORATORY TEST ORDER    Patient Name:Angelica Romero   Transplant Date: 2/8/2017  YOB: 1965  Issue Date & Time: February 10, 2017 10:42 AM     [facility] MR: [id number]  Expiration Date:  (1 year after date issued)        Diagnoses: Aftercare following organ transplant (ICD-10  Z48.288)   Kidney Transplant (ICD-10  Z94.0)   Long term use of medications (ICD-10  Z79.899)     ?Lab results to be available on the same day drawn.   ?Patient should release information to the [facility], [facility].  ?Please fax to the Transplant Center at [phone].    3x/week, First 4Weeks post-transplant    2/8/2017 - 3/8/2017    2x/week, Months 2-3 post-transplant    3/9/2017 - 5/9/2017       1x/week, Months 4-6 post-transplant    5/10/2017 - 8/10/2017  Every 2 weeks, Months 7-9 post-transplant    8/11/2017 - 11/11/2017  Every 3 weeks, Months 10-12 post-transplant   11/12/2017 - 2/8/2018       ?Hemogram and Platelet   ?Basic Metabolic Panel (Sodium, Potassium,Chloride, CO2, Creatinine, Urea Nitrogen, Glucose, Calcium)   ?Prograf/Tacrolimus drug level     Weekly through first 3 months post-transplant    2/8/2017 - 5/8/2017   ?Phosphorus, Magnesium   ?MPA level (mycophenolic acid) once per week for first 3 months.    ?Please add a diff to hemogram once per week for the first 3 months.    At 6 & 12 months post-transplant         8/2017 & 2/2018   ? HBsurfaceAb, HBcoreAb, HCV at 6 months only -   ? Liver Function Tests(Bilirubin Direct/Total, AST, ALT, Alkaline Phosphatase)   ?Complete Lipid Panel fasting (Cholesterol, Triglycerides, HDL, LDL)   ? Random Urine for Protein/Creatinineratio    At month(s) 1, 2, 4, 6, 9, 12      3/2017, 4/2017, 6/2017, 8/2017, 11/2017, 2/2018                  ? PRA/DSA level (mailers provided by the patient)                  ? BK PCR Quantitative (Polyoma virus - blood in purple top tube to Reference Lab)      If you have any questions, please  call The Transplant Center at [phone] or [phone].    Please faxall results to [phone].        Jose Naranjo MD  Department of Surgery

## 2017-02-10 NOTE — PROGRESS NOTES
Care Coordinator  D/I: Per Julia Bess Possible d/c this weekend with [facility] Monday,Tuesday, Wednesday.  P: I called Iqra at [facility] scheduling [phone] #6 #2 and she is aware.

## 2017-02-10 NOTE — PROGRESS NOTES
Transplant Surgery  Inpatient Daily Progress Note  02/10/2017    Assessment & Plan: Angelica Romero is a 50 yo female with a past medical history significant for end stage kidney disease 2/2 HTN on HD MWF via left AV fistula, still made ~1 cup of urine daily.  [facility] to [facility] 2/8/17, NO stent placed.    Graft function: Good. Cr worsening 5.6->6->6.8, but urine output improving (140ml/hr overnight). No renal ultrasound yet, will get urine protein today to trend if Cr does not improve.  Immunosuppression management: Induction: Thymo - received 175 mg intra-op, 175 mg POD1, 175 mg POD2 today. Solu-Medrol 500 mg intraop, 250 mg POD1, 100 mg POD2 today.   mg BID.  Started Progaf 2 BID last night with improvement in urine output, will check level tomorrow.  Complexity of management:Medium. Contributing factors: induction   Pain: pain well controlled with oral meds  Hematology: Hgb stable.  Cardiorespiratory: Stable, mildly hypertensive. Will increase norvasc to 10 mg daily, continue metoprolol at 25 mg BID.  GI/Nutrition: Regular diet as tolerated. No nausea. Will d/c Protonix today as patient taking regular diet. Continue senna-docusate BID. AROBF, will consider suppository tomorrow if needed.  Endocrine: Steroid induced hyperglycemia, Hgb A1c 4.0, continue to monitor glucoses.  Fluid/Electrolytes: Will d/c fluid today, d/c Lasix as kidney making more urine. K stable 4.3, Phos high at 7.2 but no binders now, expect will decrease with improvement in kidney function. Mg stable at 2.2.  : Reis to remain due to new surgical anastomosis, plan for removal tomorrow POD3.  Infectious disease: PPx with valcyte, bactrim.    Donor urine culture returned positive E coli, kleb pneumo in sputum. On Cipro (2/9 start, plan for 1 week with reculture 3 days after last dose), increase to 250mg BID today for renal dosing.  Positive Quant Gold in 2012, never treated. Will start isoniazid 300mg daily with 25mg vit B6 daily for 9 months for  "latent TB. LFTs tomorrow and then monthly while on treatment.  Prophylaxis: DVT, fall  Disposition: 7A, anticipate discharge tomorrow, staying locally    Medical Decision Making: Medium  Subsequent visit 80997 (moderate level decision making)    MANOLO/Fellow/Resident Provider: Felisha Coy M.D.    Faculty: Jose Naranjo M.D., M.S.  _________________________________________________________________  Transplant History: Admitted 2017 for  donor kidney transplant.  2017 (Kidney), Postoperative day: 2     Interval History: History is obtained from the patient  Overnight events: No acute events. Nephrology consulted noted patient had a positive Quant Gold test in ; patient denies being treated for this.    Subjectively, feels well. Pain controlled on po meds. Has ambulated a few times. Tolerating regular diet, no n/v. Denies SOB or CP. Making better urine overnight (~140ml/hr) but Cr uptrending. BP elevated.    ROS:   A 10-point review of systems was negative except as noted above.    Meds:    anti-thymocyte globulin  175 mg Intravenous Central line Once     ciprofloxacin  250 mg Oral Q12H     [START ON 2017] amLODIPine  10 mg Oral Daily     isoniazid  300 mg Oral Daily     pyridoxine  25 mg Oral Daily     [START ON 2017] sulfamethoxazole-trimethoprim  1 tablet Oral Once per day on  Sat     metoprolol  25 mg Oral BID     tacrolimus  2 mg Oral BID IS     sodium chloride (PF)  10 mL Intracatheter Q8H     valGANciclovir  450 mg Oral Once per day on Mon Thu     mycophenolate  750 mg Oral BID IS     senna-docusate  1-2 tablet Oral BID       Physical Exam:     Admit Weight: 87 kg (191 lb 12.8 oz)    Current vitals:   /75 mmHg  Pulse 86  Temp(Src) 97.5  F (36.4  C) (Oral)  Resp 16  Ht 1.575 m (5' 2\")  Wt 92.262 kg (203 lb 6.4 oz)  BMI 37.19 kg/m2  SpO2 94%    CVP (mmHg): 10 mmHg    Vital sign ranges:    Temp:  [97.1  F (36.2  C)-97.9  F (36.6  C)] 97.5  F (36.4  C)  Pulse:  [86] " 86  Heart Rate:  [83-98] 83  Resp:  [16-18] 16  BP: (132-178)/() 132/75 mmHg  SpO2:  [94 %-97 %] 94 %  Patient Vitals for the past 24 hrs:   BP Temp Temp src Pulse Heart Rate Resp SpO2 Weight   02/10/17 1104 132/75 mmHg 97.5  F (36.4  C) - - 83 16 94 % -   02/10/17 0719 144/83 mmHg 97.8  F (36.6  C) - - 84 16 97 % 92.262 kg (203 lb 6.4 oz)   02/10/17 0400 (!) 165/94 mmHg 97.3  F (36.3  C) Oral - 87 18 97 % -   02/10/17 0045 148/83 mmHg 97.4  F (36.3  C) Oral 86 - 16 96 % -   02/09/17 2001 (!) 178/93 mmHg 97.3  F (36.3  C) Oral - 98 17 95 % -   02/09/17 1630 158/84 mmHg - - - - - - -   02/09/17 1610 169/85 mmHg 97.9  F (36.6  C) Oral - 93 18 95 % -   02/09/17 1345 (!) 164/103 mmHg 97.1  F (36.2  C) Oral - 96 18 95 % -     General Appearance: in no apparent distress, laying comfortably in bed  Skin: normal, warm, dry  Heart: regular rate and rhythm  Lungs: CTAB, ADALI  Abdomen: soft, appropriately tender, incision c/d/i with staples present, dressing removed, slight ecchymosis laterally  : galvin is present, urine clear   Extremities: edema: absent.   Neurologic: awake, alert and oriented. Tremor absent.     Data:   CMP  Recent Labs  Lab 02/10/17  0635 02/09/17  2117  02/09/17  0541  02/08/17  1714 02/08/17  1333     --   --  138  < > 142 141   POTASSIUM 4.3 4.3  < > 4.5  < > 3.8 3.7   CHLORIDE 103  --   --  102  < >  --  102   CO2 20  --   --  21  < >  --  30   *  --   --  193*  < > 111* 76   BUN 47*  --   --  28  < >  --  21   CR 6.83*  --   --  5.98*  < >  --  4.89*   GFRESTIMATED 6*  --   --  7*  < >  --  9*   GFRESTBLACK 8*  --   --  9*  < >  --  11*   GIOVANNA 9.0  --   --  8.2*  < >  --  9.4   ICAW  --   --   --   --   --  4.5  --    MAG 2.2  --   --  1.5*  < >  --   --    PHOS 7.2*  --   --  4.7*  < >  --   --    ALBUMIN  --   --   --   --   --   --  3.6   BILITOTAL  --   --   --   --   --   --  0.3   ALKPHOS  --   --   --   --   --   --  56   AST  --   --   --   --   --   --  11   ALT  --   --    --   --   --   --  20   < > = values in this interval not displayed.  CBC  Recent Labs  Lab 02/10/17  0635 02/09/17  2117  02/09/17  0541  02/08/17  1333   HGB 9.3* 9.3*  < > 10.6*  < > 11.4*   WBC 7.5  --   --  17.4*  < > 6.8   *  --   --  190  < > 289   A1C  --   --   --   --   --  4.0*   < > = values in this interval not displayed.  COAGS    Recent Labs  Lab 02/08/17  1333   INR 1.13   PTT 28      Urinalysis  Recent Labs   Lab Test  04/11/12   1151   COLOR  Yellow   APPEARANCE  Slightly Cloudy   URINEGLC  Negative   URINEBILI  Negative   URINEKETONE  Negative   SG  1.012   UBLD  Negative   URINEPH  8.0*   PROTEIN  30*   NITRITE  Negative   LEUKEST  Large*   RBCU  1   WBCU  6*     Virology:  CMV IGG ANTIBODY   Date Value Ref Range Status   04/10/2012 <0.20  Negative for anti-CMV IgG U/mL Final     EBV VCA IGG ANTIBODY   Date Value Ref Range Status   04/10/2012 >750.00  Positive, suggests immunologic exposure. U/mL Final     HEPATITIS C ANTIBODY   Date Value Ref Range Status   02/08/2017  NR Final    Nonreactive   Assay performance characteristics have not been established for newborns,   infants, and children       HEP B SURFACE JOHN   Date Value Ref Range Status   04/10/2012 2.1  Final     Comment:     Negative, No antibody detected when the value is less than 5.0 mlU/mL.

## 2017-02-10 NOTE — CONSULTS
INITIAL RENAL TRANSPLANT CONSULTATION       CONSULTING PHYSICIAN:  Dr. Jose Naranjo.      REASON FOR CONSULTATION:  Kidney transplant post engraftment management for  donor kidney transplantation.      HISTORY OF PRESENT ILLNESS:  Ms. Angelica Romero is a 51-year-old lady with end-stage kidney disease who has been on dialysis for over 5 years.  Her end-stage renal disease etiology was presumed to be secondary to hypertension versus unknown.  She dialyzes 3 times weekly via fistula.  She last dialyzed the morning of the surgery on Wednesday, 2017.  Her estimated dry weight is 86.5.  She was in the usual state of health when she was called for  donor kidney transplant.  She tolerated the procedure fairly well and is progressing appropriately.  Postoperatively she noted some nausea and reports her pain is under control.  She is up-to-date on her age appropriate cancer screening, according to her and her cardiac workup was last updated in 2016 with nuclear stress test showing normal myocardial effect, no inducible ischemia, and estimated ejection fraction of 76%.  Her last echocardiogram was done in 2016 showing preserved EF 55% to 60%.  No pericardial effusion, normal aortic valve, mild mitral insufficiency and mild tricuspid insufficiency.        REVIEW OF SYSTEMS:  A comprehensive review was negative except as noted in the HPI.      PAST MEDICAL HISTORY:  Significant for end-stage kidney disease secondary to hypertension on dialysis for over 5 years, renal osteodystrophy, anemia of chronic kidney disease, obesity, VENUS 3 of the cervix status post vaginal hysterectomy, history of abnormal mammograms, status post lumpectomy showing intraductal papilloma adenosis but no invasive tumor, margins were negative.  Colonic polyps, negative for neoplasm.  Adenomyosis status post hysterectomy.        PAST SURGICAL HISTORY:  Significant for fistula placement status post  donor kidney transplant,  hysterectomy, and lumpectomy.      SOCIAL HISTORY:  , former smoker, not current.  No drinking, no drugs.      FAMILY HISTORY:  Significant for diabetes, hypertension, coronary artery disease and cousin on dialysis, ovarian cancer in grandmother.      HOME MEDICATIONS:  Reviewed, includes:   1.  Amlodipine.   2.  Metoprolol.   3.  Lisinopril.   4.  Hydralazine.   5.  binders.   6.  Multivitamins.      PHYSICAL EXAMINATION:   VITAL SIGNS:  Reviewed, temperature 97.1, heart rate 90, blood pressure 130s-170s over 80s-90s, satting 92% to 99% on room air.   GENERAL:  Pleasant, no acute distress.   HEENT:  Normocephalic, atraumatic.  Moist mucous membranes.   NECK:  Supple.    CHEST:  Clear to auscultation bilateral.   HEART:  Regular, no gallops or rubs.   ABDOMEN:  Soft, benign, mildly tender.   EXTREMITIES:  Without edema.   GENITOURINARY:  Reis in place with faint blood-tinged urine.     NEUROLOGIC:  Grossly intact.   PSYCHIATRIC:  Appropriate mood.      DIAGNOSTIC DATA:  Reviewed.  Sodium 138, potassium 4.5, chloride 102, bicarb 21, BUN 28, creatinine 5.9, calcium 8.2, magnesium 1.5, phosphorus 0.5.  White count 17.0, hemoglobin 10.6, platelets 190.  The patient is anuric.  The patient had history of latent TB evident on Q-Gold in .  She is CMV na?ve, EBV exposed, hepatis B negative, hepatitis C negative, HIV negative.  The patient was mildly sensitized with negative crossmatch and no preformed donor-specific antibodies.  Cardiac workup and cancer screening workup reviewed as above.      ASSESSMENT AND PLAN:   1.  End-stage kidney disease secondary to hypertension versus unknown, status post  donor kidney transplant.   2.  Immune suppression management.   3.  Hypertension.   4.  Anemia of chronic kidney disease, mild, stable.   5.  Renal osteodystrophy.   6.  Immunosuppression prophylaxis:  The patient is CMV naiive, donor positive, i.e. high risk for CMV conversion.  EBV donor exposed, recipient  exposed.        DISCUSSION:  Overall, Ms. Romero appears to be progressing fairly well following  donor kidney transplant.  She is producing copious amounts of urine which is a change as patient was anuric on dialysis.  She does not appear to require dialysis today.  She will receive her final induction per protocol and okay to initiate CMI.  She will continue with CellCept per protocol.  In regard to the anemia, would like to check her iron profile and replace as needed.  In regard to her renal osteodystrophy, would like to get PTH and vitamin D and manage her accordingly.  In regard to her immune suppression prophylaxis, she requires Bactrim for life and CMV coverage for 6 months due to CMV sero-mismatch status.  In regard to her latent TB, will need to verify if patient had received INH therapy prior to her transplantation.  If not, then the course would be commenced for 9 months with INH. We will continue to monitor and follow along with you.      Thank you for the consultation.         MUKUL DAY MD             D: 2017 23:54   T: 02/10/2017 05:41   MT: [email or web address]      Name:     IVANIA ROMERO   MRN:      [id number]        Account:       [id number]   :      1965           Consult Date:  2017      Document: [id number]       cc: Jose Naranjo MD

## 2017-02-10 NOTE — PLAN OF CARE
Problem: Goal Outcome Summary  Goal: Goal Outcome Summary  Outcome: Improving  Afebrile.  BP slightly elevated today.  Norvasc dose increased for tomorrow.  Creatinine slightly elevated today.  Reis catheter in place draining adequate amts. Of yellow urine. Pain well controlled with PO meds.   Thymo infused without issue.  Med.card and lab book started.  Set up on Mimiboard.  Started on meds for latent TB noticed from prior admission.  Up with slight assist.  Contnue to monitor, support

## 2017-02-10 NOTE — CONSULTS
Transplant Admission Psychosocial Assessment    Patient Name: Angelica Romero  : 1965  Age: 51 year old  MRN: [id number]  Completed assessment with: Ms. Romero, the kidney recipient.     Patient underwent a  donor kidney transplant.  Met with patient to update psychosocial assessment and provide brief education about SW role while inpatient, as well as expectations/requirements and follow up needs post-transplant. SW also provided education about need for compliance with transplant medications, and explained ESRD Medicare benefits and medication coverage under Medicare part B.  Medicare 2728 forms completed and signed by patient.  Presenting Information   Living Situation: Ms. Romero lives in [place], [place] with her  Elliott.   If not local, plans for short term stay:  Her home is a one and a half to two hour drive from this hospital. She therefore has no plans to stay locally.   Previous Functional Status: She has been unemployed due to disability, and has some visual loss. She sometimes uses a walker to ambulate.  Cultural/Language/Spiritual Considerations: None     Support System  Primary Support Person Her   Other support:  Adult children  Plan for support in immediate post-transplant period: Family. She will also be followed by [facility], as arranged by the care coordinator.     Mental Health/Coping:   History of Mental Health: No reported issues  Current status: She appears to be coping well. We did discuss the potential loss of not seeing people at her dialysis unit on a regular basis, as she has been going there for 4-5 years. Information about writing to the donor's family was provided, as she is interested in doing so in the future.   Coping: Ms. Romero is coping appropriately and looking forward to her potential discharge home tomorrow.   Services Needed/Recommended: None    Financial   Income: SSDI. Her  is collecting unemployment benefits, as he recently lost  his job.   Impact of transplant on income: None at present.   Insurance and medication coverage: Ms. Romero has Medicare A &B, as well as Part D via [employer]. She also had [place] MA until 12/31/16. She was surprised to learn it is not currently active, as she reports submitting her renewal paperwork at the end of December/beginning of January. No one at her dialysis unit or the pharmacy since the end of December has informed her that her MA is not valid. As Part B covers 80% of her immunosuppression costs, she will likely owe 20% at discharge without MA coverage.   Financial concerns: Medication co pays, if her MA is unable to pay.   Resources needed: Reactivation of her [place] MA. I will contact discharge pharmacy about putting her 20% on account, as her MA will hopefully be retroactive.     Assessment and recommendations:  Ms. Romero is coping well with her new kidney transplant. She is looking forward to going home tomorrow. Her  will provide transportation. She is aware that CRYSTAL Khalil is her outpatient  and that she will be available for her psychosocial needs. (pager [phone])

## 2017-02-10 NOTE — PROGRESS NOTES
Transfer  Transferred to: 7A  Via: Wheelchair  Reason for transfer:Pt no longer appropriate for 6B- improved patient condition  Family: Aware of transfer  Belongings: Packed and sent with pt  Chart: Delivered with pt to next unit  Medications: Meds delivered with pt to next unit   Report given to: Diane MEDEIROS  Pt status: VSS, pain controlled, up SBA and with walker for comfort.

## 2017-02-10 NOTE — PROVIDER NOTIFICATION
Paged MD to inform about pt's anxiety. Since arriving on 7A pt states that she has been feeling anxious since coming to the hospital and has not been able to sleep much during the night.    MD placed orders for Melatonin PRN and Atarax PRN.

## 2017-02-10 NOTE — PLAN OF CARE
"Problem: Goal Outcome Summary  Goal: Goal Outcome Summary  Outcome: No Change  6682-5485: Pt came up from  around 1830. Since arriving on the unit, pt has been slightly hypertensive due to anxiety, PRN Atarax given x1 and PRN Melatonin given x1, since the administration of these two medications to pt states she feels \"less anxious\". All other VSS on RA. Pt complaining of pain in the abdomen on the right side, PRN Percocet given x1, pt stated relief. Pt complaining of some heartburn, PRN Calcium Carbonate given x1. Pt denies nausea. Triple Lumen IJ, blue port currently infusing with D5 1/2 NS at 50 mL/hr, red and white port SL. Reis catheter with adequate amounts of urine output. No BM since surgery and not passing gas, scheduled stool softener administered. Incision covered with dressing, scant amounts of drainage marked with no change. Up with SBA. Pt ambulated the unit with SBA and walker. Pt also spent a few hours up in the chair. Call light in reach. Will continue to monitor and follow plan of care.         "

## 2017-02-10 NOTE — DISCHARGE INSTRUCTIONS
Diet recommendations post-transplant: High protein diet x 8 weeks.  Heart healthy dietary habits long term (low saturated/trans fat, low sodium). Practice food safety precautions. See nutrition section of transplant handbook for more information.

## 2017-02-10 NOTE — PROGRESS NOTES
Transplant Social Work: Insurance & Medications  D/I: I have spoken with Keke Navid, discharge pharmacy liaison, and asked if her immunosuppression 20% copayment can be put on account and billed to [place] once active again. She will put a note in the system for this to occur. I have also spoken with Ana Lilia Sterling in transplant finance. She has also spoken with this patient about the lapse in her MA. She referred her to a person at [facility], who has completed a new [facility] application on-line with .   P: To update Ms. Romero's outpatient , Samantha Markham, so that she might follow up with this patient as needed.

## 2017-02-10 NOTE — PLAN OF CARE
"Problem: Goal Outcome Summary  Goal: Goal Outcome Summary  Outcome: No Change  /94 mmHg  Pulse 86  Temp(Src) 97.3  F (36.3  C) (Oral)  Resp 18  Ht 1.575 m (5' 2\")  Wt 86.637 kg (191 lb)  BMI 34.93 kg/m2  SpO2 97%RA.Pt was moving around well this am walking to bathroom and brushing teeth.Reis has good output and medicated for pain with percocet.IV at 50cc hr and denies any nausea. Pt has not passed any gas or BM yet but taking senna.Will continue to POC.        "

## 2017-02-10 NOTE — PROGRESS NOTES
Call from Bonnie at Angelica's local dialysis unit. They made some phone calls when she did not come in for dialysis today. She has some services available once discharged and they will contact the transplant  as necessary next week.

## 2017-02-11 VITALS
WEIGHT: 202.6 LBS | OXYGEN SATURATION: 98 % | DIASTOLIC BLOOD PRESSURE: 77 MMHG | RESPIRATION RATE: 16 BRPM | SYSTOLIC BLOOD PRESSURE: 165 MMHG | HEART RATE: 76 BPM | TEMPERATURE: 97.4 F | HEIGHT: 62 IN | BODY MASS INDEX: 37.28 KG/M2

## 2017-02-11 LAB
ALBUMIN SERPL-MCNC: 2.8 G/DL (ref 3.4–5)
ALP SERPL-CCNC: 39 U/L (ref 40–150)
ALT SERPL W P-5'-P-CCNC: 27 U/L (ref 0–50)
ANION GAP SERPL CALCULATED.3IONS-SCNC: 13 MMOL/L (ref 3–14)
AST SERPL W P-5'-P-CCNC: 14 U/L (ref 0–45)
BASOPHILS # BLD AUTO: 0 10E9/L (ref 0–0.2)
BASOPHILS NFR BLD AUTO: 0 %
BILIRUB DIRECT SERPL-MCNC: <0.1 MG/DL (ref 0–0.2)
BILIRUB SERPL-MCNC: 0.6 MG/DL (ref 0.2–1.3)
BUN SERPL-MCNC: 61 MG/DL (ref 7–30)
CALCIUM SERPL-MCNC: 9.1 MG/DL (ref 8.5–10.1)
CHLORIDE SERPL-SCNC: 105 MMOL/L (ref 94–109)
CO2 SERPL-SCNC: 20 MMOL/L (ref 20–32)
CREAT SERPL-MCNC: 7.56 MG/DL (ref 0.52–1.04)
DIFFERENTIAL METHOD BLD: ABNORMAL
EOSINOPHIL # BLD AUTO: 0 10E9/L (ref 0–0.7)
EOSINOPHIL NFR BLD AUTO: 0 %
ERYTHROCYTE [DISTWIDTH] IN BLOOD BY AUTOMATED COUNT: 13.8 % (ref 10–15)
GFR SERPL CREATININE-BSD FRML MDRD: 6 ML/MIN/1.7M2
GLUCOSE SERPL-MCNC: 106 MG/DL (ref 70–99)
HCT VFR BLD AUTO: 27.8 % (ref 35–47)
HGB BLD-MCNC: 9.2 G/DL (ref 11.7–15.7)
IMM GRANULOCYTES # BLD: 0 10E9/L (ref 0–0.4)
IMM GRANULOCYTES NFR BLD: 0.3 %
LYMPHOCYTES # BLD AUTO: 0.1 10E9/L (ref 0.8–5.3)
LYMPHOCYTES NFR BLD AUTO: 3.6 %
MAGNESIUM SERPL-MCNC: 2.4 MG/DL (ref 1.6–2.3)
MCH RBC QN AUTO: 31.9 PG (ref 26.5–33)
MCHC RBC AUTO-ENTMCNC: 33.1 G/DL (ref 31.5–36.5)
MCV RBC AUTO: 97 FL (ref 78–100)
MONOCYTES # BLD AUTO: 0.3 10E9/L (ref 0–1.3)
MONOCYTES NFR BLD AUTO: 8.3 %
NEUTROPHILS # BLD AUTO: 2.7 10E9/L (ref 1.6–8.3)
NEUTROPHILS NFR BLD AUTO: 87.8 %
NRBC # BLD AUTO: 0 10*3/UL
NRBC BLD AUTO-RTO: 0 /100
PHOSPHATE SERPL-MCNC: 7.4 MG/DL (ref 2.5–4.5)
PLATELET # BLD AUTO: 104 10E9/L (ref 150–450)
POTASSIUM SERPL-SCNC: 4.2 MMOL/L (ref 3.4–5.3)
PROT SERPL-MCNC: 6 G/DL (ref 6.8–8.8)
RBC # BLD AUTO: 2.88 10E12/L (ref 3.8–5.2)
SODIUM SERPL-SCNC: 137 MMOL/L (ref 133–144)
TACROLIMUS BLD-MCNC: ABNORMAL UG/L (ref 5–15)
TME LAST DOSE: ABNORMAL H
WBC # BLD AUTO: 3 10E9/L (ref 4–11)

## 2017-02-11 PROCEDURE — 25000132 ZZH RX MED GY IP 250 OP 250 PS 637: Mod: GY | Performed by: STUDENT IN AN ORGANIZED HEALTH CARE EDUCATION/TRAINING PROGRAM

## 2017-02-11 PROCEDURE — 83735 ASSAY OF MAGNESIUM: CPT | Performed by: PHYSICIAN ASSISTANT

## 2017-02-11 PROCEDURE — 25000132 ZZH RX MED GY IP 250 OP 250 PS 637: Mod: GY | Performed by: SURGERY

## 2017-02-11 PROCEDURE — A9270 NON-COVERED ITEM OR SERVICE: HCPCS | Mod: GY | Performed by: PHYSICIAN ASSISTANT

## 2017-02-11 PROCEDURE — A9270 NON-COVERED ITEM OR SERVICE: HCPCS | Mod: GY | Performed by: SURGERY

## 2017-02-11 PROCEDURE — 25000132 ZZH RX MED GY IP 250 OP 250 PS 637: Mod: GY | Performed by: TRANSPLANT SURGERY

## 2017-02-11 PROCEDURE — 36592 COLLECT BLOOD FROM PICC: CPT | Performed by: PHYSICIAN ASSISTANT

## 2017-02-11 PROCEDURE — 85025 COMPLETE CBC W/AUTO DIFF WBC: CPT | Performed by: PHYSICIAN ASSISTANT

## 2017-02-11 PROCEDURE — 25000128 H RX IP 250 OP 636: Performed by: ANESTHESIOLOGY

## 2017-02-11 PROCEDURE — A9270 NON-COVERED ITEM OR SERVICE: HCPCS | Mod: GY | Performed by: TRANSPLANT SURGERY

## 2017-02-11 PROCEDURE — 25000131 ZZH RX MED GY IP 250 OP 636 PS 637: Mod: GY | Performed by: TRANSPLANT SURGERY

## 2017-02-11 PROCEDURE — 80197 ASSAY OF TACROLIMUS: CPT | Performed by: SURGERY

## 2017-02-11 PROCEDURE — A9270 NON-COVERED ITEM OR SERVICE: HCPCS | Mod: GY | Performed by: STUDENT IN AN ORGANIZED HEALTH CARE EDUCATION/TRAINING PROGRAM

## 2017-02-11 PROCEDURE — 25000131 ZZH RX MED GY IP 250 OP 636 PS 637: Mod: GY | Performed by: SURGERY

## 2017-02-11 PROCEDURE — 80076 HEPATIC FUNCTION PANEL: CPT | Performed by: PHYSICIAN ASSISTANT

## 2017-02-11 PROCEDURE — 25000132 ZZH RX MED GY IP 250 OP 250 PS 637: Mod: GY | Performed by: PHYSICIAN ASSISTANT

## 2017-02-11 PROCEDURE — 84100 ASSAY OF PHOSPHORUS: CPT | Performed by: PHYSICIAN ASSISTANT

## 2017-02-11 PROCEDURE — 80048 BASIC METABOLIC PNL TOTAL CA: CPT | Performed by: PHYSICIAN ASSISTANT

## 2017-02-11 RX ORDER — POLYETHYLENE GLYCOL 3350 17 G/17G
17 POWDER, FOR SOLUTION ORAL DAILY PRN
Status: DISCONTINUED | OUTPATIENT
Start: 2017-02-11 | End: 2017-02-11 | Stop reason: HOSPADM

## 2017-02-11 RX ORDER — METOPROLOL TARTRATE 50 MG
50 TABLET ORAL 2 TIMES DAILY
Qty: 60 TABLET | Refills: 11 | Status: SHIPPED | OUTPATIENT
Start: 2017-02-11 | End: 2018-02-28

## 2017-02-11 RX ORDER — SULFAMETHOXAZOLE AND TRIMETHOPRIM 400; 80 MG/1; MG/1
1 TABLET ORAL
Qty: 12 TABLET | Refills: 11 | Status: SHIPPED
Start: 2017-02-11 | End: 2017-02-17

## 2017-02-11 RX ORDER — OXYCODONE AND ACETAMINOPHEN 5; 325 MG/1; MG/1
1-2 TABLET ORAL EVERY 4 HOURS PRN
Qty: 30 TABLET | Refills: 0 | Status: SHIPPED | OUTPATIENT
Start: 2017-02-11 | End: 2017-03-30

## 2017-02-11 RX ORDER — VALGANCICLOVIR 450 MG/1
450 TABLET, FILM COATED ORAL
Qty: 8 TABLET | Refills: 6 | Status: SHIPPED
Start: 2017-02-13 | End: 2017-02-17

## 2017-02-11 RX ORDER — METOPROLOL TARTRATE 25 MG/1
25 TABLET, FILM COATED ORAL 2 TIMES DAILY
Qty: 60 TABLET | Refills: 11 | Status: SHIPPED | OUTPATIENT
Start: 2017-02-11 | End: 2017-02-11

## 2017-02-11 RX ORDER — VALGANCICLOVIR 450 MG/1
450 TABLET, FILM COATED ORAL
Status: DISCONTINUED | OUTPATIENT
Start: 2017-02-11 | End: 2017-02-11 | Stop reason: HOSPADM

## 2017-02-11 RX ORDER — TACROLIMUS 1 MG/1
2 CAPSULE ORAL 2 TIMES DAILY
Qty: 60 CAPSULE | Refills: 11 | Status: SHIPPED | OUTPATIENT
Start: 2017-02-11 | End: 2017-02-11

## 2017-02-11 RX ORDER — AMOXICILLIN 250 MG
1-2 CAPSULE ORAL 2 TIMES DAILY PRN
Qty: 100 TABLET | Refills: 1 | Status: SHIPPED | OUTPATIENT
Start: 2017-02-11 | End: 2017-06-08

## 2017-02-11 RX ORDER — ONDANSETRON 4 MG/1
4 TABLET, ORALLY DISINTEGRATING ORAL EVERY 6 HOURS PRN
Qty: 30 TABLET | Refills: 0 | Status: SHIPPED | OUTPATIENT
Start: 2017-02-11 | End: 2017-06-08

## 2017-02-11 RX ORDER — ISONIAZID 300 MG/1
300 TABLET ORAL DAILY
Qty: 30 TABLET | Refills: 8 | Status: SHIPPED
Start: 2017-02-11 | End: 2017-02-17

## 2017-02-11 RX ORDER — TACROLIMUS 1 MG/1
2 CAPSULE ORAL 2 TIMES DAILY
Qty: 120 CAPSULE | Refills: 11 | Status: SHIPPED | OUTPATIENT
Start: 2017-02-11 | End: 2017-02-12

## 2017-02-11 RX ORDER — LABETALOL HYDROCHLORIDE 5 MG/ML
10 INJECTION, SOLUTION INTRAVENOUS ONCE
Status: COMPLETED | OUTPATIENT
Start: 2017-02-11 | End: 2017-02-11

## 2017-02-11 RX ORDER — CIPROFLOXACIN 500 MG/1
500 TABLET, FILM COATED ORAL EVERY 24 HOURS
Qty: 5 TABLET | Refills: 0 | Status: SHIPPED | OUTPATIENT
Start: 2017-02-11 | End: 2017-03-30

## 2017-02-11 RX ORDER — MYCOPHENOLATE MOFETIL 250 MG/1
750 CAPSULE ORAL 2 TIMES DAILY
Qty: 180 CAPSULE | Refills: 11 | Status: SHIPPED | OUTPATIENT
Start: 2017-02-11 | End: 2017-02-17

## 2017-02-11 RX ORDER — METOPROLOL TARTRATE 25 MG/1
50 TABLET, FILM COATED ORAL 2 TIMES DAILY
Qty: 60 TABLET | Refills: 11 | Status: SHIPPED | OUTPATIENT
Start: 2017-02-11 | End: 2017-02-11

## 2017-02-11 RX ORDER — MYCOPHENOLATE MOFETIL 250 MG/1
750 CAPSULE ORAL 2 TIMES DAILY
Qty: 60 CAPSULE | Refills: 11 | Status: SHIPPED | OUTPATIENT
Start: 2017-02-11 | End: 2017-02-11

## 2017-02-11 RX ORDER — TACROLIMUS 1 MG/1
3 CAPSULE ORAL
Status: DISCONTINUED | OUTPATIENT
Start: 2017-02-11 | End: 2017-02-11 | Stop reason: HOSPADM

## 2017-02-11 RX ORDER — BISACODYL 10 MG
10 SUPPOSITORY, RECTAL RECTAL DAILY PRN
Status: DISCONTINUED | OUTPATIENT
Start: 2017-02-11 | End: 2017-02-11 | Stop reason: HOSPADM

## 2017-02-11 RX ORDER — PYRIDOXINE HCL (VITAMIN B6) 25 MG
25 TABLET ORAL DAILY
Qty: 30 TABLET | Refills: 8 | Status: SHIPPED | OUTPATIENT
Start: 2017-02-11 | End: 2017-10-19

## 2017-02-11 RX ORDER — AMLODIPINE BESYLATE 10 MG/1
10 TABLET ORAL DAILY
Qty: 30 TABLET | Refills: 11 | Status: SHIPPED | OUTPATIENT
Start: 2017-02-11 | End: 2018-02-28

## 2017-02-11 RX ADMIN — MYCOPHENOLATE MOFETIL 750 MG: 250 CAPSULE ORAL at 09:08

## 2017-02-11 RX ADMIN — Medication 25 MG: at 09:08

## 2017-02-11 RX ADMIN — BISACODYL 10 MG: 10 SUPPOSITORY RECTAL at 13:23

## 2017-02-11 RX ADMIN — ISONIAZID 300 MG: 300 TABLET ORAL at 09:08

## 2017-02-11 RX ADMIN — SENNOSIDES AND DOCUSATE SODIUM 2 TABLET: 8.6; 5 TABLET ORAL at 09:08

## 2017-02-11 RX ADMIN — OXYCODONE HYDROCHLORIDE AND ACETAMINOPHEN 1 TABLET: 5; 325 TABLET ORAL at 15:34

## 2017-02-11 RX ADMIN — SULFAMETHOXAZOLE AND TRIMETHOPRIM 1 TABLET: 400; 80 TABLET ORAL at 09:09

## 2017-02-11 RX ADMIN — TACROLIMUS 2 MG: 1 CAPSULE ORAL at 09:08

## 2017-02-11 RX ADMIN — LABETALOL HYDROCHLORIDE 10 MG: 5 INJECTION, SOLUTION INTRAVENOUS at 05:13

## 2017-02-11 RX ADMIN — AMLODIPINE BESYLATE 10 MG: 10 TABLET ORAL at 09:08

## 2017-02-11 RX ADMIN — CIPROFLOXACIN HYDROCHLORIDE 500 MG: 500 TABLET, FILM COATED ORAL at 09:09

## 2017-02-11 RX ADMIN — POLYETHYLENE GLYCOL 3350 17 G: 17 POWDER, FOR SOLUTION ORAL at 13:24

## 2017-02-11 RX ADMIN — OXYCODONE HYDROCHLORIDE AND ACETAMINOPHEN 1 TABLET: 5; 325 TABLET ORAL at 11:18

## 2017-02-11 RX ADMIN — METOPROLOL TARTRATE 25 MG: 25 TABLET ORAL at 09:08

## 2017-02-11 NOTE — PLAN OF CARE
Problem: Goal Outcome Summary  Goal: Goal Outcome Summary    3649-2733:  HTN 160s-170s, received PRN Labetolol x1. Pain managed with Percocet x1. Denies nausea. Tolerating regular diet with fair appetite. Incision stapled and BERNARDO. Reis in place with adequate output. Passing gas, no BM overnight. Pt UAL. Pt hopeful for discharge today. Will continue to monitor and follow POC.

## 2017-02-11 NOTE — PROGRESS NOTES
"Transplant Nephrology Progress Note  02/11/2017         Assessment & Recommendations:   1. DDKT - slightly increased serum creatinine, but lower rate of rise.  Good urine output.  Would expect creatinine to start to improve in next day or two.  No acute indications for dialysis.  2. Immunosuppression management - will continue on tacrolimus and mycophenolate mofetil.  3. HTN - fair control and would recommend increasing metoprolol to 50 mg bid and continue amlodipine 10 mg daily.  4. Anemia - stable Hgb.  5. Renal osteodystrophy.    6. Immunosuppression prophylaxis:  The patient is CMV naiive, donor positive, i.e. high risk for CMV conversion.  EBV donor exposed, recipient exposed.      7. Latent TB will need to start INH and vitamin B6 x 9 month   8. Donor derived infection (donor cultures suggestive of UTI and Pneumonia) - will finish out 7 days of antibiotics.      Bill Tejeda MD     Interval History :   In the last 24 hours Angelica Romero's kidney function is still a bit elevated with increased creatinine to ~ 7.6.  Very good urine output.  Patient feels well with less leg swelling.  No fever, sweats or chills.  No nausea or vomiting.  Passing gas, but no bowel movement yet.  No chest pain or shortness of breath.    Review of Systems:   4 point ROS was obtained and negative, except as noted in Interval History.    Physical Exam:   I/O last 3 completed shifts:  In: 240 [P.O.:240]  Out: 2850 [Urine:2850]   /94 mmHg  Pulse 85  Temp(Src) 97.5  F (36.4  C) (Oral)  Resp 16  Ht 1.575 m (5' 2\")  Wt 91.899 kg (202 lb 9.6 oz)  BMI 37.05 kg/m2  SpO2 98%     GENERAL APPEARANCE: alert and no distress  HENT: mouth without ulcers or lesions  PULM: lungs clear to auscultation, equal air movement  CV: regular rhythm, normal rate     - no LE edema bilaterally  GI: soft, mildly tender, nondistended, bowel sounds are wnl  INTEGUMENT: no rash      Labs:   All labs reviewed by me  Electrolytes/Renal -   Recent " Labs   Lab Test  02/11/17   0610  02/10/17   0635  02/09/17   2117   02/09/17   0541   NA  137  136   --    --   138   POTASSIUM  4.2  4.3  4.3   < >  4.5   CHLORIDE  105  103   --    --   102   CO2  20  20   --    --   21   BUN  61*  47*   --    --   28   CR  7.56*  6.83*   --    --   5.98*   GLC  106*  125*   --    --   193*   GIOVANNA  9.1  9.0   --    --   8.2*   MAG  2.4*  2.2   --    --   1.5*   PHOS  7.4*  7.2*   --    --   4.7*    < > = values in this interval not displayed.       CBC -   Recent Labs   Lab Test  02/11/17   0610  02/10/17   0635  02/09/17   2117   02/09/17   0541   WBC  3.0*  7.5   --    --   17.4*   HGB  9.2*  9.3*  9.3*   < >  10.6*   PLT  104*  118*   --    --   190    < > = values in this interval not displayed.       LFTs -   Recent Labs   Lab Test  02/11/17 0610 02/08/17   1333  09/10/16   0428   ALKPHOS  39*  56  48   BILITOTAL  0.6  0.3  0.6   ALT  27  20  21   AST  14  11  12   PROTTOTAL  6.0*  7.5  7.6   ALBUMIN  2.8*  3.6  3.8       Iron Panel - No lab results found.    Imaging:  All imaging studies reviewed by me.     Current Medications:    amLODIPine  10 mg Oral Daily     isoniazid  300 mg Oral Daily     pyridoxine  25 mg Oral Daily     ciprofloxacin  500 mg Oral Q24H [facility]     sulfamethoxazole-trimethoprim  1 tablet Oral Once per day on Mon Wed Sat     metoprolol  25 mg Oral BID     tacrolimus  2 mg Oral BID IS     sodium chloride (PF)  10 mL Intracatheter Q8H     valGANciclovir  450 mg Oral Once per day on Mon Thu     mycophenolate  750 mg Oral BID IS     senna-docusate  1-2 tablet Oral BID        Bill Tejeda MD

## 2017-02-11 NOTE — PROGRESS NOTES
[facility]   Transplant Infectious Disease Research Note   Patient:  Angelica Romero, Date of birth 1965, Medical record number [id number]  Date of Visit:  02/11/2017  Transplants:  2/8/2017 (Kidney), Postoperative day:  3    Natural History of Infection Caused by BK Virus (and other Opportunistic Viral Pathogens) in Renal and Renal-Pancreas Transplant Patients      Sponsor:  National Institutes of Health and the National [place] of Allergy and Infectious Diseases (NIAID)    Protocol Number: [id number]    : Bridget Estevez MD       Phone: [phone]   Department of Medicine, Division of Infectious Diseases    IRB number [id number]  Angelica Romero was approached on 02/11/2017 for this BK virus natural history study.  For this study, blood and urine samples will be collected monthly from 1 to 6 months after kidney transplant to see if BK virus is active.  Blood and urine samples will be collected every other month from months 6 to 12. During the consent process, the risks, benefits, and alternatives of this study were discussed.  The alternative is not to participate in the study.  Angelica Romero has not had a detectable BK virus test since transplant, so she is eligible. She was given time to ask questions and did volunteer to participate in this clinical study.  Angelica Romero has signed consent, and has a copy of the signed consent form.  The version of the consent form used is the update from 09Sept2016, approved by the IRB on 06Oct2016. She lives in [place], so when she is not on site, our study team will work with her to get samples sent in.   Attestation:   I have reviewed today's vital signs, medications, labs and imaging.   BRIDGET ESTEVEZ, Pager [phone]

## 2017-02-11 NOTE — PROGRESS NOTES
"Transplant Nephrology Progress Note  02/10/2017         Assessment & Recommendations:   1.  End-stage kidney disease secondary to hypertension versus unknown, status post  donor kidney transplant.    2.  Immunosuppression management.    3.  Hypertension.    4.  Anemia of chronic kidney disease, mild, stable.    5.  Renal osteodystrophy.    6.  Immunosuppression prophylaxis:  The patient is CMV naiive, donor positive, i.e. high risk for CMV conversion.  EBV donor exposed, recipient exposed.      7. Latent TB will need to start INH and vitamin B6 x 9 month   8. Donor derived infection (donor cultures suggestive of UTI and Pneumonia)     Overall doing well. Feels better. Producing large amount of urine. Cr. Is slightly up but no immediate need for dialysis. Await PTH and vitamin D. Please start INH and vitamin B6 for 9 month. Close LFT monitoring. Abx coverage for bacterial prophylaxis secondary to #8    Thony Montes MD     Interval History :   In the last 24 hours Angelica Romero feels better   Review of Systems:     GI: fair appetite. no nausea or vomiting or diarrhea.   Neuro:  no confusion  Constitutional:  no fever or chills  CV: no dyspnea or edema.  no chest pain.    Physical Exam:   I/O last 3 completed shifts:  In: 1675.83 [P.O.:360; I.V.:1315.83]  Out: 3025 [Urine:3025]   /93 mmHg  Pulse 77  Temp(Src) 97.8  F (36.6  C) (Oral)  Resp 16  Ht 1.575 m (5' 2\")  Wt 92.262 kg (203 lb 6.4 oz)  BMI 37.19 kg/m2  SpO2 95%     GENERAL APPEARANCE: alert and no distress  EYES:  no scleral icterus, pupils equal  HENT: mouth without ulcers or lesions  PULM: lungs clear to auscultation, equal air movement, no clubbing  CV: regular rhythm, normal rate, no rub     -JVP wnl     -edema minimal    GI: soft, mildly tender, nondistended, bowel sounds are wnl  INTEGUMENT: no cyanosis, no rash  NEURO:  no asterixis       Labs:   All labs reviewed by me  Electrolytes/Renal -   Recent Labs   Lab Test  02/10/17   " 0635 02/09/17 2117 02/09/17 1730 02/09/17   0541   02/08/17   2105   NA  136   --    --    --   138   --   140   POTASSIUM  4.3  4.3  4.4   < >  4.5   < >  4.2   CHLORIDE  103   --    --    --   102   --   106   CO2  20   --    --    --   21   --   22   BUN  47*   --    --    --   28   --   26   CR  6.83*   --    --    --   5.98*   --   5.59*   GLC  125*   --    --    --   193*   --   122*   GIOVANNA  9.0   --    --    --   8.2*   --   7.8*   MAG  2.2   --    --    --   1.5*   --   1.6   PHOS  7.2*   --    --    --   4.7*   --   3.9    < > = values in this interval not displayed.       CBC -   Recent Labs   Lab Test  02/10/17   0635  02/09/17   2117  02/09/17   1730   02/09/17   0541   02/08/17   2105   WBC  7.5   --    --    --   17.4*   --   3.6*   HGB  9.3*  9.3*  9.9*   < >  10.6*   < >  10.3*   PLT  118*   --    --    --   190   --   150    < > = values in this interval not displayed.       LFTs -   Recent Labs   Lab Test  02/08/17   1333  09/10/16   0428  04/10/12   0738   ALKPHOS  56  48  75   BILITOTAL  0.3  0.6  0.6   ALT  20  21  15   AST  11  12  18   PROTTOTAL  7.5  7.6  7.7   ALBUMIN  3.6  3.8  4.3       Iron Panel - No lab results found.      Imaging:  All imaging studies reviewed by me.     Current Medications:    [START ON 2/11/2017] amLODIPine  10 mg Oral Daily     isoniazid  300 mg Oral Daily     pyridoxine  25 mg Oral Daily     [START ON 2/11/2017] ciprofloxacin  500 mg Oral Q24H [facility]     [START ON 2/11/2017] sulfamethoxazole-trimethoprim  1 tablet Oral Once per day on Mon Wed Sat     metoprolol  25 mg Oral BID     tacrolimus  2 mg Oral BID IS     sodium chloride (PF)  10 mL Intracatheter Q8H     valGANciclovir  450 mg Oral Once per day on Mon Thu     mycophenolate  750 mg Oral BID IS     senna-docusate  1-2 tablet Oral BID        Thony Montes MD

## 2017-02-11 NOTE — DISCHARGE SUMMARY
[facility], [place]    Discharge Summary  Transplant Surgery    Date of Admission:  2017  Date of Discharge:  2017  8:05 PM  Discharging Provider: Felisha Coy  Attending Provider: Jose Naranjo    Discharge Diagnoses  Active Problems:    End stage kidney disease (H)    -donor kidney transplant      Procedure/Surgery Information  Procedure: Procedure(s):   Donor Kidney Transplant - Wound Class: II-Clean Contaminated   Surgeon(s): Surgeon(s) and Role:     * Jose Naranjo MD - Primary     * Nadine Benites MD - Assisting     * Felisha Coy MD - Resident - Assisting    Date: 2017     Non-operative procedures CVC in OR     History of Present Illness  Angelica Romero is a 51 year old woman who presented for admission for  donor kidney transplant. She has a h/o ESRD due to HTN on HD MWF via left AV fistula. Last had dialysis the morning of admission. She still makes ~1 cup of urine daily. She does not have any other significant past medical history; denies h/o diabetes, heart disease, or lung disease.    Hospital Course  Angelica Romero was admitted on 2017. She underwent DDKT to [facility] on 2017. No stent was placed.  min, WIT 27 min. DSA Plan A, DSA not present at time of transplant.    The following problems were addressed during her hospitalization:    Graft function: Appropriate graft function with good urine output (~140ml/hr) and stable electrolytes and fluid status. Sluggish Cr - uptrending 5.6->6->6.8, anticipate Cr will slowly downtrend over the next week. Urine protein was 2.71 on 2/10, plan to follow as outpatient with Transplant Nephrology.    Immunosuppression management: Underwent induction with 6mg/kg total thymoglobulin over POD0, POD1, POD2 and Solu-Medrol 500mg POD0, 250mg POD1, and 100mg POD2. Cellcept was started at 750mg BID on POD0. Prograf was started on POD2 pm, level after <0.3 after 3 doses. Will continue to  monitor levels in ATC.    Cardiorespiratory: Stable, mildly hypertensive during admission. Was previously on 4 anti-hypertensives (metoprolol, amlodipine, HCTZ, lisinopril). Metoprolol 25mg BID started and amlodipine 5mg daily started postoperatively, with increase in amlodipine to 10mg daily on POD2. Will continue to monitor BP as outpatient and titrate BP meds as needed.    GI/Nutrition: Tolerated a regular diet without nausea or vomiting. Was passing flatus prior to discharge.     Endocrine: Mild steroid-induced hyperglycemia, improved during hospitalization.    Fluid/Electrolytes: IVFs and Lasix discontinued on POD2. Potassium remained stable. Phos high (7.4 on day of discharge) but expect to improve as kidney function improves as above. Will continue to monitor electrolytes at ATC.    : Reis removed on POD3 with adequate PVRs.    Infectious disease:   CMV D+R-, EBV R+ (donor unknown), PPx with Bactrim, Valcyte x6 months.  Donor urine culture returned positive E coli, sputum culture returned positive Klebsiella pneumonia. Started on ciprofloxacin 250mg q24h on 2/9, increased to 250mg BID with slow improvement in renal function. Will discharge on ciprofloxacin 500mg q24h as it will concentrate more in urine. Plan for total 7 day course (end 2/16), with urine reculture 3 days after (on 2/19).  Positive Quant Gold noted in 2012, never treated. Started isoniazid 300mg daily with 25mg vit B6 daily on 2/10 for 9 months for latent TB. LFTs on 2/11 stable, will be checked monthly as outpatient. Will follow up with Transplant ID for latent TB as outpatient.    Significant Findings:   - Sluggish Cr but good urine output and fluid status.   - CMV D+/R-, donor urine E coli, donor sputum Klebsiella, patient with positive Quantiferon Gold test (2012) - see above.  - NO STENT    Felisha Coy    Discharge Disposition  Discharged to home   Condition at discharge: Stable    Pending Results  These results will be followed up  "by Transplant Coordinator.  Unresulted Labs Ordered in the Past 30 Days of this Admission     Date and Time Order Name Status Description    2/10/2017 2330 Tacrolimus level In process         Final pathology results: No pathology submitted    Day of Discharge Physical Exam:  /77 (BP Location: Right arm)  Pulse 76  Temp 97.4  F (36.3  C) (Oral)  Resp 16  Ht 1.575 m (5' 2\")  Wt 91.9 kg (202 lb 9.6 oz)  SpO2 98%  BMI 37.06 kg/m2     General Appearance: in no apparent distress, laying comfortably in bed  Skin: normal, warm, dry  Heart: regular rate and rhythm  Lungs: CTAB, ADALI  Abdomen: soft, appropriately tender, incision c/d/i with staples present, slight ecchymosis laterally  : no galvin  Extremities: no edema  Neurologic: awake, alert and oriented. Tremor absent.    Consultations This Hospital Stay  VASCULAR ACCESS CARE ADULT IP CONSULT  SOCIAL WORK IP CONSULT  PHARMACY IP CONSULT  NUTRITION SERVICES ADULT IP CONSULT  NEPHROLOGY KIDNEY/PANCREAS TRANSPLANT ADULT IP CONSULT    Time Spent on This Encounter  I have spent greater than 30 minutes on this discharge.    Discharge Orders  Discharge Medications  Current Discharge Medication List      START taking these medications    Details   oxyCODONE-acetaminophen (PERCOCET) 5-325 MG per tablet Take 1-2 tablets by mouth every 4 hours as needed for moderate to severe pain This medication contains Tylenol (acetaminophen). Do not take more than 4,000 mg of Tylenol (acetaminophen) from all sources to prevent liver damage.  Qty: 30 tablet, Refills: 0    Associated Diagnoses: -donor kidney transplant      ondansetron (ZOFRAN-ODT) 4 MG ODT tab Take 1 tablet (4 mg) by mouth every 6 hours as needed for nausea  Qty: 30 tablet, Refills: 0    Associated Diagnoses: -donor kidney transplant      isoniazid (NYDRAZID) 300 MG tablet Take 1 tablet (300 mg) by mouth daily Take for 9 months total. You should have your liver function tests drawn monthly while on this " medication.  Qty: 30 tablet, Refills: 8    Associated Diagnoses: Latent tuberculosis by blood test      valGANciclovir (VALCYTE) 450 MG tablet Take 1 tablet (450 mg) by mouth twice a week You should take this medication for 6 months total after your transplant. The dose of this medication may need to be changed as your kidney function improves (ask your Nephrologist).  Qty: 8 tablet, Refills: 6    Associated Diagnoses: -donor kidney transplant      senna-docusate (SENOKOT-S;PERICOLACE) 8.6-50 MG per tablet Take 1-2 tablets by mouth 2 times daily as needed for constipation  Qty: 100 tablet, Refills: 1    Associated Diagnoses: -donor kidney transplant      sulfamethoxazole-trimethoprim (BACTRIM/SEPTRA) 400-80 MG per tablet Take 1 tablet by mouth three times a week Take on Monday, Wednesday, and Saturday. The dose of this medication may need to be changed as your kidney function improves (ask your Nephrologist).  Qty: 12 tablet, Refills: 11    Associated Diagnoses: -donor kidney transplant      pyridOXINE (VITAMIN B-6) 25 MG tablet Take 1 tablet (25 mg) by mouth daily  Qty: 30 tablet, Refills: 8    Associated Diagnoses: Latent tuberculosis by blood test      ciprofloxacin (CIPRO) 500 MG tablet Take 1 tablet (500 mg) by mouth every 24 hours  Qty: 5 tablet, Refills: 0    Associated Diagnoses: -donor kidney transplant      mycophenolate (CELLCEPT - GENERIC EQUIVALENT) 250 MG capsule Take 3 capsules (750 mg) by mouth 2 times daily  Qty: 60 capsule, Refills: 11    Associated Diagnoses: -donor kidney transplant      tacrolimus (PROGRAF - GENERIC EQUIVALENT) 1 MG capsule Take 2 capsules (2 mg) by mouth 2 times daily  Qty: 60 capsule, Refills: 11    Associated Diagnoses: -donor kidney transplant         CONTINUE these medications which have CHANGED    Details   amLODIPine (NORVASC) 10 MG tablet Take 1 tablet (10 mg) by mouth daily  Qty: 30 tablet, Refills: 11    Associated  Diagnoses: Essential hypertension      metoprolol (LOPRESSOR) 25 MG tablet Take 2 tablets (50 mg) by mouth 2 times daily  Qty: 60 tablet, Refills: 11    Associated Diagnoses: Essential hypertension         CONTINUE these medications which have NOT CHANGED    Details   calcium carbonate (TUMS) 500 MG chewable tablet Take 1 chew tab by mouth 3 times daily (with meals).    Associated Diagnoses: Hypertensive nephrosclerosis; Organ transplant candidate      polyethylene glycol (MIRALAX) powder Take 17 g by mouth daily as needed.    Associated Diagnoses: Hypertensive nephrosclerosis; Organ transplant candidate         STOP taking these medications       B Complex-C-Folic Acid (RICARDO-DULCE PO) Comments:   Reason for Stopping:         HYDROCHLOROTHIAZIDE PO Comments:   Reason for Stopping:         lisinopril (PRINIVIL,ZESTRIL) 10 MG tablet Comments:   Reason for Stopping:         B Complex-C-Folic Acid (DIALYVITE) TABS Comments:   Reason for Stopping:                 Home care nursing referral     Reason for your hospital stay   You had a kidney transplant from a  donor. You did not have a ureteral stent placed during your transplant. You also had a positive Quantiferon Gold (tuberculosis test) from  that we started treating you for latent tuberculosis during this hospitalization.     Activity   See discharge instructions.     Monitor and record   Keep track of how much urine you are making.  Monitor blood pressure and weight daily initially post transplant.   Keep track of the levels of your labs (most important are Creatinine, Potassium, and Tacrolimus levels).   Keep track of what medications you take, how much of each medication you take, when you take the medication, and why you take the medication.     Discharge Instructions   Over the next 3-5 days you will be seen in [facility] (phone [phone]) .  Your labs will be drawn just prior to your appointment between 6:30-7:00 am.  DO NOT  take your medications prior to having labs drawn. Please bring all your medications with you from home to take after labs are drawn.    LABS:  CBC, BMP, Mg, Phos and Tacrolimus levels to be drawn daily while in ATC, then every Monday, Wednesday, Friday by home health care nurse if arranged, or at an outpatient lab.   Urinalysis and urine culture should be drawn 3 days after last ciprofloxacin dose (2/19/2017).  You will need a random urine protein level drawn on 2/13/2017.  Liver function tests should be drawn every 1 month starting 3/10/2017 while patient on isoniazid.     FOLLOW UP APPOINTMENTS:  Remember to always bring an updated medication list to all appointments.     An appointment with your transplant surgeon will be scheduled for approximately 2 weeks following discharge from the hospital.  Your transplant surgeon is: Dr. Jose Naranjo M.D., M.S..  You will follow up with transplant nephrology in clinic at 1 and 3 months and then annually.   You will need follow up with Transplant Infectious Disease for latent TB.  Follow up with primary care provider in 4-8 weeks. (Pt to schedule)  Call scheduling at [phone] (option 1) if you have not heard about your appointments within 48 hours after discharge.    WHEN TO CONTACT YOUR  COORDINATOR:  Transplant Coordinator [phone]  Notify your coordinator if you have pain over your kidney, increased redness or drainage from your incision, fever greater than 100.5F, or decreased urine output.  Notify your coordinator immediately if you are ever unable to take your immunosuppressive medications for any reason.  If it is outside of office hours, please call the hospital switchboard at [phone] and ask to have the kidney transplant surgery fellow paged for urgent medical questions, or present to the emergency department.    ACTIVITY:  Walk at least four times a day, lift no greater than 10 pounds for 6-8 weeks from the time of surgery.  No driving while taking  narcotics or 3 weeks after surgery.    DIET:  Diet recommendations post-transplant: Heart healthy dietary habits long term (low saturated/trans fat, low sodium). High protein diet x 8 weeks. Practice food safety precautions.    OTHER INSTRUCTIONS:  You have staples in place. They will be removed in 3 weeks after operation at a clinic appointment.  Do not bathe/soak the incision in water for at least 6 weeks after surgery; showering is ok. You do not need to do anything special to clean the incision. You can let water/soap run over the incision in the shower and otherwise keep it clean and dry.     [facility] Follow-up and recommended labs and tests   LABS:  CBC, BMP, Mg, Phos and Tacrolimus levels to be drawn daily while in ATC, then every Monday, Wednesday, Friday by home health care nurse if arranged, or at an outpatient lab.   Urinalysis and urine culture should be drawn 3 days after last ciprofloxacin dose (2/19/2017).  You will need a random urine protein level drawn on 2/13/2017.  Liver function tests should be drawn every 1 month starting 3/10/2017 while patient on isoniazid.     FOLLOW UP APPOINTMENTS:  Remember to always bring an updated medication list to all appointments.     An appointment with your transplant surgeon will be scheduled for approximately 2 weeks following discharge from the hospital.  Your transplant surgeon is: Dr. Jose Naranjo M.D., M.S..  You will follow up with transplant nephrology in clinic at 1 and 3 months and then annually.   You will need follow up with Transplant Infectious Disease for latent TB.  Follow up with primary care provider in 4-8 weeks. (Pt to schedule)      Appointments on [place] and/or [facility] (with [facility] or [facility] provider or service). Call [phone] if you haven't heard regarding these appointments within 7 days of discharge.     Full Code     Diet   See discharge instructions.           Data  Most Recent 3 CBC's:  Recent Labs   Lab Test   02/11/17   0610  02/10/17   0635  02/09/17   2117   02/09/17   0541   WBC  3.0*  7.5   --    --   17.4*   HGB  9.2*  9.3*  9.3*   < >  10.6*   MCV  97  98   --    --   101*   PLT  104*  118*   --    --   190    < > = values in this interval not displayed.      Most Recent 3 BMP's:  Recent Labs   Lab Test  02/11/17   0610  02/10/17   0635  02/09/17   2117   02/09/17   0541   NA  137  136   --    --   138   POTASSIUM  4.2  4.3  4.3   < >  4.5   CHLORIDE  105  103   --    --   102   CO2  20  20   --    --   21   BUN  61*  47*   --    --   28   CR  7.56*  6.83*   --    --   5.98*   ANIONGAP  13  13   --    --   15*   GIOVANNA  9.1  9.0   --    --   8.2*   GLC  106*  125*   --    --   193*    < > = values in this interval not displayed.     Most Recent 2 LFT's:  Recent Labs   Lab Test  02/11/17   0610  02/08/17   1333   AST  14  11   ALT  27  20   ALKPHOS  39*  56   BILITOTAL  0.6  0.3     Most Recent Cholesterol Panel:  Recent Labs   Lab Test  02/08/17   1333   CHOL  231*   LDL  111*   HDL  49*   TRIG  356*     Most Recent 6 Bacteria Isolates From Any Culture (See EPIC Reports for Culture Details):No lab results found.  Most Recent TSH, T4 and A1c Labs:  Recent Labs   Lab Test  02/08/17   1333   A1C  4.0*

## 2017-02-12 ENCOUNTER — INFUSION THERAPY VISIT (OUTPATIENT)
Dept: INFUSION THERAPY | Facility: CLINIC | Age: 52
DRG: 652 | End: 2017-02-12
Attending: SURGERY
Payer: MEDICARE

## 2017-02-12 VITALS
OXYGEN SATURATION: 98 % | SYSTOLIC BLOOD PRESSURE: 151 MMHG | TEMPERATURE: 97.6 F | DIASTOLIC BLOOD PRESSURE: 79 MMHG | BODY MASS INDEX: 37.13 KG/M2 | WEIGHT: 203 LBS

## 2017-02-12 DIAGNOSIS — Z48.298 AFTERCARE FOLLOWING ORGAN TRANSPLANT: ICD-10-CM

## 2017-02-12 DIAGNOSIS — Z94.0 KIDNEY REPLACED BY TRANSPLANT: ICD-10-CM

## 2017-02-12 DIAGNOSIS — Z94.0 DECEASED-DONOR KIDNEY TRANSPLANT: ICD-10-CM

## 2017-02-12 LAB
ANION GAP SERPL CALCULATED.3IONS-SCNC: 14 MMOL/L (ref 3–14)
BLD PROD TYP BPU: NORMAL
BLD PROD TYP BPU: NORMAL
BLD UNIT ID BPU: 0
BLD UNIT ID BPU: 0
BLOOD PRODUCT CODE: NORMAL
BLOOD PRODUCT CODE: NORMAL
BPU ID: NORMAL
BPU ID: NORMAL
BUN SERPL-MCNC: 80 MG/DL (ref 7–30)
CALCIUM SERPL-MCNC: 8.8 MG/DL (ref 8.5–10.1)
CHLORIDE SERPL-SCNC: 109 MMOL/L (ref 94–109)
CO2 SERPL-SCNC: 19 MMOL/L (ref 20–32)
CREAT SERPL-MCNC: 7.53 MG/DL (ref 0.52–1.04)
ERYTHROCYTE [DISTWIDTH] IN BLOOD BY AUTOMATED COUNT: 13.5 % (ref 10–15)
GFR SERPL CREATININE-BSD FRML MDRD: 6 ML/MIN/1.7M2
GLUCOSE SERPL-MCNC: 87 MG/DL (ref 70–99)
HCT VFR BLD AUTO: 28.7 % (ref 35–47)
HGB BLD-MCNC: 9.8 G/DL (ref 11.7–15.7)
MAGNESIUM SERPL-MCNC: 2.3 MG/DL (ref 1.6–2.3)
MCH RBC QN AUTO: 32.9 PG (ref 26.5–33)
MCHC RBC AUTO-ENTMCNC: 34.1 G/DL (ref 31.5–36.5)
MCV RBC AUTO: 96 FL (ref 78–100)
PHOSPHATE SERPL-MCNC: 6 MG/DL (ref 2.5–4.5)
PLATELET # BLD AUTO: 127 10E9/L (ref 150–450)
POTASSIUM SERPL-SCNC: 4.5 MMOL/L (ref 3.4–5.3)
RBC # BLD AUTO: 2.98 10E12/L (ref 3.8–5.2)
SODIUM SERPL-SCNC: 141 MMOL/L (ref 133–144)
TACROLIMUS BLD-MCNC: 4.8 UG/L (ref 5–15)
TME LAST DOSE: ABNORMAL H
TRANSFUSION STATUS PATIENT QL: NORMAL
WBC # BLD AUTO: 5 10E9/L (ref 4–11)

## 2017-02-12 PROCEDURE — 25000132 ZZH RX MED GY IP 250 OP 250 PS 637: Mod: ZF | Performed by: INTERNAL MEDICINE

## 2017-02-12 PROCEDURE — 80197 ASSAY OF TACROLIMUS: CPT | Performed by: INTERNAL MEDICINE

## 2017-02-12 PROCEDURE — 83735 ASSAY OF MAGNESIUM: CPT | Performed by: INTERNAL MEDICINE

## 2017-02-12 PROCEDURE — 99215 OFFICE O/P EST HI 40 MIN: CPT | Mod: ZF

## 2017-02-12 PROCEDURE — 84100 ASSAY OF PHOSPHORUS: CPT | Performed by: INTERNAL MEDICINE

## 2017-02-12 PROCEDURE — 80048 BASIC METABOLIC PNL TOTAL CA: CPT | Performed by: INTERNAL MEDICINE

## 2017-02-12 PROCEDURE — A9270 NON-COVERED ITEM OR SERVICE: HCPCS | Mod: ZF | Performed by: INTERNAL MEDICINE

## 2017-02-12 PROCEDURE — 85027 COMPLETE CBC AUTOMATED: CPT | Performed by: INTERNAL MEDICINE

## 2017-02-12 RX ORDER — TACROLIMUS 1 MG/1
3 CAPSULE ORAL 2 TIMES DAILY
Qty: 180 CAPSULE | Refills: 11 | Status: CANCELLED | OUTPATIENT
Start: 2017-02-12

## 2017-02-12 RX ORDER — TACROLIMUS 0.5 MG/1
CAPSULE ORAL
Qty: 30 CAPSULE | Refills: 3 | Status: SHIPPED | OUTPATIENT
Start: 2017-02-12 | End: 2017-02-13

## 2017-02-12 RX ORDER — TACROLIMUS 0.5 MG/1
3 CAPSULE ORAL 2 TIMES DAILY
Qty: 30 CAPSULE | Refills: 3 | Status: SHIPPED | OUTPATIENT
Start: 2017-02-12 | End: 2017-02-12

## 2017-02-12 RX ORDER — TACROLIMUS 1 MG/1
3 CAPSULE ORAL 2 TIMES DAILY
Qty: 120 CAPSULE | Refills: 11 | COMMUNITY
Start: 2017-02-12 | End: 2017-02-17

## 2017-02-12 RX ORDER — TACROLIMUS 0.5 MG/1
CAPSULE ORAL
Qty: 30 CAPSULE | Refills: 11 | OUTPATIENT
Start: 2017-02-12 | End: 2024-08-08

## 2017-02-12 RX ORDER — BISACODYL 10 MG
10 SUPPOSITORY, RECTAL RECTAL ONCE
Status: COMPLETED | OUTPATIENT
Start: 2017-02-12 | End: 2017-02-12

## 2017-02-12 RX ADMIN — BISACODYL 10 MG: 10 SUPPOSITORY RECTAL at 09:04

## 2017-02-12 NOTE — PLAN OF CARE
Problem: Goal Outcome Summary  Goal: Goal Outcome Summary  Outcome: Adequate for Discharge Date Met:  02/11/17  VSS.  Pain well controlled with PO meds.  Creatinine up today but OK per MD. No BM but patient with lots of gas.  Got supp, senna and miralax.  Eating well with no nausea.  Reis dc'd this AM.  Void x 2 with small residuals.  DCV orders written and reviewed with patient.  Has all needed scripts.  Report called to ATC.  To f/u tomorrow as outpatient.

## 2017-02-12 NOTE — MR AVS SNAPSHOT
After Visit Summary   2017    Angelica Romero    MRN: [id number]           Patient Information     Date Of Birth          1965        Visit Information        Provider Department      2017 7:00 AM UC 41 ATC; UC SPEC INFUSION [facility]        Today's Diagnoses     Aftercare following organ transplant        Kidney replaced by transplant        -donor kidney transplant           Follow-ups after your visit        Your next 10 appointments already scheduled     2017  7:00 AM CST   (Arrive by 6:45 AM)   New Transplant Visit with UC SPEC INFUSION, UC 42 ATC   [facility] ([facility])    [address]  [address] [facility] [address]   [phone]            2017  8:00 AM CST   (Arrive by 7:45 AM)   Kidney Transplant Discharge with Thony Montes MD   [facility] ([facility])    [address]  [address] [facility] [address]   [phone]            2017  7:00 AM CST   (Arrive by 6:45 AM)   New Transplant Visit with UC SPEC INFUSION, UC 43 ATC   [facility] ([facility])    [address]  2nd Floor  [facility] [address]   [phone]            2017  8:00 AM CST   (Arrive by 7:45 AM)   Kidney Transplant Discharge with Thony Montes MD   [facility] ([facility])    [address]  2nd Floor  [facility] [address]   [phone]            Feb 15, 2017  7:00 AM CST   (Arrive by 6:45 AM)   New Transplant Visit with UC SPEC INFUSION, UC 43 ATC   [facility] ([facility])    [address] [place] [address]  Floor  [facility] [address] [phone]            Feb 15, 2017  8:00 AM CST   (Arrive by 7:45 AM)   Kidney Transplant Discharge with Thony Montes MD   [facility] ([facility])    [address]  2nd Floor  [facility] [address] [phone]            Mar 07, 2017  9:40 AM CST   (Arrive by 9:25 AM)   Kidney Post Op with Jose Naranjo MD   [facility] Solid Organ Transplant ([facility])    [address]  3rd Floor  [facility] [address] [phone]              Future tests that were ordered for you today     Open Future Orders        Priority Expected Expires Ordered    Protein  random urine Routine 2/13/2017 3/14/2017 2/12/2017            Who to contact     If you have questions or need follow up information about today's clinic visit or your schedule please contact [facility] directly at [phone].  Normal or non-critical lab and imaging results will be communicated to you by [employer], letter or phone within 4 business days after the clinic has received the results. If you do not hear from us within 7 days, please contact the clinic through [employer] or phone. If you have a critical or abnormal lab result, we will notify you by phone as soon as possible.  Submit refill requests through [employer] or call your pharmacy and they will forward the refill request to us. Please allow 3 business days for your refill to be completed.          Additional Information About Your Visit        [employer] Information     [employer] gives you secure access to your electronic health record. If you see a primary care provider, you can also send messages to your care team and make appointments. If you have questions, please call your primary care clinic.  If you do not have a primary care provider, please call [phone] and they will assist you.        Care  EveryWhere ID     This is your Care EveryWhere ID. This could be used by other organizations to access your [facility] medical records  [id number]        Your Vitals Were     Temperature Pulse Oximetry BMI (Body Mass Index)             97.6  F (36.4  C) (Oral) 98% 37.13 kg/m2          Blood Pressure from Last 3 Encounters:   17 151/79   17 165/77   16 (!) 151/99    Weight from Last 3 Encounters:   17 92.1 kg (203 lb)   17 91.9 kg (202 lb 9.6 oz)   16 89 kg (196 lb 3.2 oz)              We Performed the Following     Basic metabolic panel     CBC with platelets     Magnesium     Phosphorus     Soap suds enema     Tacrolimus level          Today's Medication Changes          These changes are accurate as of: 17  3:08 PM.  If you have any questions, ask your nurse or doctor.               These medicines have changed or have updated prescriptions.        Dose/Directions    * tacrolimus 0.5 MG capsule   Commonly known as:  PROGRAF - GENERIC EQUIVALENT   This may have changed:  You were already taking a medication with the same name, and this prescription was added. Make sure you understand how and when to take each.   Used for:  Aftercare following organ transplant, Kidney replaced by transplant        BID as directed, to be used for dose adjustments.   Quantity:  30 capsule   Refills:  3       * tacrolimus 1 MG capsule   Commonly known as:  PROGRAF - GENERIC EQUIVALENT   This may have changed:  how much to take   Used for:  -donor kidney transplant        Dose:  3 mg   Take 3 capsules (3 mg) by mouth 2 times daily   Quantity:  120 capsule   Refills:  11       * Notice:  This list has 2 medication(s) that are the same as other medications prescribed for you. Read the directions carefully, and ask your doctor or other care provider to review them with you.         Where to get your medicines      These medications were sent to [facility] -  [place], [place] - [address]  [address], [facility] [address]    Hours:  TRANSPLANT PHONE NUMBER [phone] Phone:  [phone]     tacrolimus 0.5 MG capsule                Primary Care Provider Office Phone # Fax #    Harry Laboy [phone]       [address] [address]        Thank you!     Thank you for choosing [facility]  for your care. Our goal is always to provide you with excellent care. Hearing back from our patients is one way we can continue to improve our services. Please take a few minutes to complete the written survey that you may receive in the mail after your visit with us. Thank you!             Your Updated Medication List - Protect others around you: Learn how to safely use, store and throw away your medicines at www.disposemymeds.org.          This list is accurate as of: 2/12/17  3:08 PM.  Always use your most recent med list.                   Brand Name Dispense Instructions for use    amLODIPine 10 MG tablet    NORVASC    30 tablet    Take 1 tablet (10 mg) by mouth daily       ciprofloxacin 500 MG tablet    CIPRO    5 tablet    Take 1 tablet (500 mg) by mouth every 24 hours       isoniazid 300 MG tablet    NYDRAZID    30 tablet    Take 1 tablet (300 mg) by mouth daily Take for 9 months total. You should have your liver function tests drawn monthly while on this medication.       metoprolol 50 MG tablet    LOPRESSOR    60 tablet    Take 1 tablet (50 mg) by mouth 2 times daily       MIRALAX powder   Generic drug:  polyethylene glycol      Take 17 g by mouth daily as needed.       mycophenolate 250 MG capsule    CELLCEPT - GENERIC EQUIVALENT    180 capsule    Take 3 capsules (750 mg) by mouth 2 times daily       ondansetron 4 MG ODT tab    ZOFRAN-ODT    30 tablet    Take 1 tablet (4 mg) by mouth every 6 hours as needed for nausea        oxyCODONE-acetaminophen 5-325 MG per tablet    PERCOCET    30 tablet    Take 1-2 tablets by mouth every 4 hours as needed for moderate to severe pain This medication contains Tylenol (acetaminophen). Do not take more than 4,000 mg of Tylenol (acetaminophen) from all sources to prevent liver damage.       pyridOXINE 25 MG tablet    vitamin B-6    30 tablet    Take 1 tablet (25 mg) by mouth daily       senna-docusate 8.6-50 MG per tablet    SENOKOT-S;PERICOLACE    100 tablet    Take 1-2 tablets by mouth 2 times daily as needed for constipation       sulfamethoxazole-trimethoprim 400-80 MG per tablet    BACTRIM/SEPTRA    12 tablet    Take 1 tablet by mouth three times a week Take on Monday, Wednesday, and Saturday. The dose of this medication may need to be changed as your kidney function improves (ask your Nephrologist).       * tacrolimus 0.5 MG capsule    PROGRAF - GENERIC EQUIVALENT    30 capsule    BID as directed, to be used for dose adjustments.       * tacrolimus 1 MG capsule    PROGRAF - GENERIC EQUIVALENT    120 capsule    Take 3 capsules (3 mg) by mouth 2 times daily       TUMS 500 MG chewable tablet   Generic drug:  calcium carbonate      Take 1 chew tab by mouth 3 times daily (with meals).       valGANciclovir 450 MG tablet   Start taking on:  2/13/2017    VALCYTE    8 tablet    Take 1 tablet (450 mg) by mouth twice a week You should take this medication for 6 months total after your transplant. The dose of this medication may need to be changed as your kidney function improves (ask your Nephrologist).       * Notice:  This list has 2 medication(s) that are the same as other medications prescribed for you. Read the directions carefully, and ask your doctor or other care provider to review them with you.

## 2017-02-12 NOTE — PROGRESS NOTES
"Angelica Romero came to [facility] today for a lab and assess following a kidney transplant on 2/8.      Discharge date: 2/11  Transplant coordinator: Nereida Tej   Phone number patient can be reached at: [phone]      Physical Assessment:  See physical assessment located under \"Document Flowsheets\".  Incision site: c/d/i staples.   Lines: N/A Central line removed yeserday ~6 pm. Bandage to remain on for 24 hours.   Reis: N/A  Urine clarity: clear/yellow per pt report.   Hydration: pt verbalized understanding of fluid intake recommendations, reports no issues meeting 2 L/day.   Nutrition: Appetite suppressed but no N/V.    Last BM: 2/8- see note below.   Pain: Taking pain meds ~ Q6H for incision \"soreness\"      Laboratory tests: Standard labs drawn.     Plan of care for today: Labs drawn and pt assessed. Post transplant education checklist reviewed with pt and spouse. Medications thoroughly reviewed and medication box set up with pt. Pt and SO demonstrate understanding of medications and deny having any questions. Labs reviewed via telephone with Dr. Tejeda. Notified MD re: no BM since prior to surgery. Pt reports she was given suppository and enema inpt prior to d/c with no results; + flatus. Per MD- repeat suppository and enema. Suppository administered at 0900. Pt reports small/minimal results at 1000. Encouraged to ambulate with no progress. Soaps suds enema administered at 1050.  First BM very small, second BM more successful. Pt reports she has miralax but that it is at home in [place]. Pt reports she will  at [facility] after today's appt and start this afternoon.     Medication changes: Increase Tac dose, see below.     Medications administered:  Administrations This Visit     bisacodyl (DULCOLAX) Suppository 10 mg     Admin Date Action Dose Route Administered By             02/12/2017 Given 10 mg Rectal Jayshree Park, WALDEMAR                          Patient education:    The following teaching topics were " addressed: Importance of drinking 2L of non-caffeinated fluids daily, Incisional care, Signs/symptoms of infection, Good handwashing, Medications (purposes, doses and times of administration), Phone numbers to call with concenrs (Transplant coordinator, [facility] and [facility]) and Plan of care   Patient and spouse verbalized understanding and all questions answered.    Drug level:  Tac level today reviewed with Dr Tejeda who gave orders to increase to 4mg Q12 H.  Patient was updated with this information and verbalized understanding.    Discharge Plan    Pt will follow up with [facility] tomorrow for LBA  Discharge instructions reviewed with patient: YES  Patient/Representative verbalized understanding, all questions answered: YES    Discharged from unit at    with whom: spouse to hotel.    Jayshree Park RN

## 2017-02-13 ENCOUNTER — OFFICE VISIT (OUTPATIENT)
Dept: INFUSION THERAPY | Facility: CLINIC | Age: 52
End: 2017-02-13
Attending: INTERNAL MEDICINE
Payer: MEDICARE

## 2017-02-13 ENCOUNTER — CARE COORDINATION (OUTPATIENT)
Dept: CARDIOLOGY | Facility: CLINIC | Age: 52
End: 2017-02-13

## 2017-02-13 ENCOUNTER — INFUSION THERAPY VISIT (OUTPATIENT)
Dept: INFUSION THERAPY | Facility: CLINIC | Age: 52
End: 2017-02-13
Attending: SURGERY
Payer: MEDICARE

## 2017-02-13 ENCOUNTER — TELEPHONE (OUTPATIENT)
Dept: PHARMACY | Facility: CLINIC | Age: 52
End: 2017-02-13

## 2017-02-13 VITALS — OXYGEN SATURATION: 97 % | SYSTOLIC BLOOD PRESSURE: 171 MMHG | DIASTOLIC BLOOD PRESSURE: 81 MMHG | TEMPERATURE: 96.9 F

## 2017-02-13 DIAGNOSIS — Z94.0 DECEASED-DONOR KIDNEY TRANSPLANT: ICD-10-CM

## 2017-02-13 DIAGNOSIS — Z48.298 AFTERCARE FOLLOWING ORGAN TRANSPLANT: ICD-10-CM

## 2017-02-13 DIAGNOSIS — Z94.0 KIDNEY REPLACED BY TRANSPLANT: ICD-10-CM

## 2017-02-13 DIAGNOSIS — D84.9 IMMUNOSUPPRESSION (H): ICD-10-CM

## 2017-02-13 DIAGNOSIS — N18.6 END STAGE KIDNEY DISEASE (H): Primary | ICD-10-CM

## 2017-02-13 DIAGNOSIS — I15.1 HYPERTENSION SECONDARY TO OTHER RENAL DISORDERS: ICD-10-CM

## 2017-02-13 LAB
ANION GAP SERPL CALCULATED.3IONS-SCNC: 12 MMOL/L (ref 3–14)
BASOPHILS # BLD AUTO: 0 10E9/L (ref 0–0.2)
BASOPHILS NFR BLD AUTO: 0.1 %
BUN SERPL-MCNC: 80 MG/DL (ref 7–30)
CALCIUM SERPL-MCNC: 9.5 MG/DL (ref 8.5–10.1)
CHLORIDE SERPL-SCNC: 108 MMOL/L (ref 94–109)
CO2 SERPL-SCNC: 20 MMOL/L (ref 20–32)
CREAT SERPL-MCNC: 6.82 MG/DL (ref 0.52–1.04)
DIFFERENTIAL METHOD BLD: ABNORMAL
EOSINOPHIL # BLD AUTO: 0 10E9/L (ref 0–0.7)
EOSINOPHIL NFR BLD AUTO: 0.4 %
ERYTHROCYTE [DISTWIDTH] IN BLOOD BY AUTOMATED COUNT: 13.4 % (ref 10–15)
GFR SERPL CREATININE-BSD FRML MDRD: 6 ML/MIN/1.7M2
GLUCOSE SERPL-MCNC: 89 MG/DL (ref 70–99)
HCT VFR BLD AUTO: 30 % (ref 35–47)
HGB BLD-MCNC: 10.2 G/DL (ref 11.7–15.7)
IMM GRANULOCYTES # BLD: 0.1 10E9/L (ref 0–0.4)
IMM GRANULOCYTES NFR BLD: 0.7 %
LYMPHOCYTES # BLD AUTO: 0.2 10E9/L (ref 0.8–5.3)
LYMPHOCYTES NFR BLD AUTO: 2.7 %
MAGNESIUM SERPL-MCNC: 2.3 MG/DL (ref 1.6–2.3)
MCH RBC QN AUTO: 32.5 PG (ref 26.5–33)
MCHC RBC AUTO-ENTMCNC: 34 G/DL (ref 31.5–36.5)
MCV RBC AUTO: 96 FL (ref 78–100)
MONOCYTES # BLD AUTO: 0.4 10E9/L (ref 0–1.3)
MONOCYTES NFR BLD AUTO: 5.2 %
NEUTROPHILS # BLD AUTO: 6.1 10E9/L (ref 1.6–8.3)
NEUTROPHILS NFR BLD AUTO: 90.9 %
NRBC # BLD AUTO: 0 10*3/UL
NRBC BLD AUTO-RTO: 0 /100
PHOSPHATE SERPL-MCNC: 5 MG/DL (ref 2.5–4.5)
PLATELET # BLD AUTO: 137 10E9/L (ref 150–450)
POTASSIUM SERPL-SCNC: 4.2 MMOL/L (ref 3.4–5.3)
RBC # BLD AUTO: 3.14 10E12/L (ref 3.8–5.2)
SODIUM SERPL-SCNC: 139 MMOL/L (ref 133–144)
TACROLIMUS BLD-MCNC: 6.5 UG/L (ref 5–15)
TME LAST DOSE: NORMAL H
WBC # BLD AUTO: 6.7 10E9/L (ref 4–11)

## 2017-02-13 PROCEDURE — 83735 ASSAY OF MAGNESIUM: CPT | Performed by: INTERNAL MEDICINE

## 2017-02-13 PROCEDURE — 40000809 ZZH STATISTIC NO DOCUMENTATION TO SUPPORT CHARGE

## 2017-02-13 PROCEDURE — 80048 BASIC METABOLIC PNL TOTAL CA: CPT | Performed by: INTERNAL MEDICINE

## 2017-02-13 PROCEDURE — 99215 OFFICE O/P EST HI 40 MIN: CPT | Mod: ZF

## 2017-02-13 PROCEDURE — 84100 ASSAY OF PHOSPHORUS: CPT | Performed by: INTERNAL MEDICINE

## 2017-02-13 PROCEDURE — 80197 ASSAY OF TACROLIMUS: CPT | Performed by: INTERNAL MEDICINE

## 2017-02-13 PROCEDURE — 85027 COMPLETE CBC AUTOMATED: CPT | Performed by: INTERNAL MEDICINE

## 2017-02-13 PROCEDURE — 85004 AUTOMATED DIFF WBC COUNT: CPT | Performed by: INTERNAL MEDICINE

## 2017-02-13 PROCEDURE — 36415 COLL VENOUS BLD VENIPUNCTURE: CPT

## 2017-02-13 PROCEDURE — 80180 DRUG SCRN QUAN MYCOPHENOLATE: CPT | Performed by: INTERNAL MEDICINE

## 2017-02-13 RX ORDER — TACROLIMUS 0.5 MG/1
CAPSULE ORAL
Qty: 30 CAPSULE | Refills: 3 | Status: SHIPPED | OUTPATIENT
Start: 2017-02-13 | End: 2017-02-17

## 2017-02-13 NOTE — PROGRESS NOTES
ASSESSMENT AND PLAN:   1.  End-stage kidney disease, status post  donor kidney transplant complicated by slightly slow graft function.   2.  Immunosuppression management.   3.  Anemia of chronic kidney disease.   4.  Renal osteodystrophy.   5.  Latent TB.   6.  Opportunistic infection prophylaxis.      DISCUSSION:  Overall, Ms. Angelica Romero is a delightful 51-year-old lady with end-stage kidney disease.  She underwent a  donor kidney transplant on 2017.  Her postoperative course was notable for slow graft function.  Her creatinine came down slightly, and peaked at 7.8.  Today, it is down in the 6 range.  Her immunosuppression appears to be well tolerated.  Currently on CellCept and Prograf.  We will continue to monitor the levels closely and keep her goal of Prograf level around 8, and eventually to 10 in the first 3 months, and MPA between 2 and 3.  In regards to her hypertension, she is currently on her home regimen.  Blood pressure is slightly generous, but better at home.  I asked her to keep a log, and if the blood pressure remains elevated, we will consider adding Coreg in place of metoprolol.  In regards to her renal osteodystrophy, we will continue her current regimen.  In regards to her latent TB, she is currently on isoniazid therapy 300 mg daily with vitamin B6, and she will continue this regimen for 9 months, and will monitor her liver function tests closely.  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  Therefore, her Valcyte prophylaxis will be extended for a full 6 months.  She will return to clinic in the a.m., and we will continue to monitor her closely.  Of note, her constipation was relieved with the interventions yesterday.      REASON FOR VISIT:  Immunosuppression management and followup post-kidney transplant.      HISTORY OF PRESENT ILLNESS:  Ms. Angelica Romero is a 51-year-old lady with end-stage  kidney disease.  She has been on dialysis for over 5 years.  She dialyzed near home via fistula.  She was tolerating dialysis well.  She underwent a  donor kidney transplant on 2017 postoperatively.  Her allograft was slightly slow; however, she did not require any dialysis.  She denies any pain or pressure.  No shortness of breath.  No nausea, vomiting or diarrhea.  Her appetite is slowly improving.  She was noted to have a positive QuantiFERON Gold, and since she had never been on INH therapy this was started during her hospitalization, and she appears to be tolerating this fairly well.  Her LFTs were checked on , and were essentially normal.  Her original disease was thought to be related to hypertension.         Had couple of loose kel and I asked Jayshree her nurse dress the area with strips.        Transplant Hx:       Tx: DDKT  Date: 2017       Present Maintenance IS: Tacrolimus and Mycophenolate mofetil       Baseline Creatinine: TBD       Recent DSA: No         Biopsy: No    Home BP: 140s to 150s.      ROS:   A comprehensive review of systems was obtained and negative, except as noted in the HPI or PMH.    Active Medical Problems:  Patient Active Problem List   Diagnosis     Hypertension     Obesity, unspecified     End stage kidney disease (H)     Kidney transplant candidate     -donor kidney transplant       Personal Hx:  Social History     Social History     Marital status:      Spouse name: N/A     Number of children: N/A     Years of education: N/A     Occupational History     Not on file.     Social History Main Topics     Smoking status: Former Smoker     Packs/day: 0.20     Years: 15.00     Types: Cigarettes     Quit date: 2016     Smokeless tobacco: Never Used     Alcohol use 1.8 oz/week     3 Standard drinks or equivalent per week     Drug use: No     Sexual activity: Not on file     Other Topics Concern     Not on file     Social History Narrative        Allergies:  No Known Allergies    Medications:  Prior to Admission medications    Medication Sig Start Date End Date Taking? Authorizing Provider   tacrolimus (PROGRAF - GENERIC EQUIVALENT) 0.5 MG capsule Take one capsule twice daily for dose adjustments. 2/13/17   Bill Tejeda MD   tacrolimus (PROGRAF - GENERIC EQUIVALENT) 1 MG capsule Take 3 capsules (3 mg) by mouth 2 times daily 2/12/17   Bill Tejeda MD   oxyCODONE-acetaminophen (PERCOCET) 5-325 MG per tablet Take 1-2 tablets by mouth every 4 hours as needed for moderate to severe pain This medication contains Tylenol (acetaminophen). Do not take more than 4,000 mg of Tylenol (acetaminophen) from all sources to prevent liver damage. 2/11/17   Felisha Coy MD   ondansetron (ZOFRAN-ODT) 4 MG ODT tab Take 1 tablet (4 mg) by mouth every 6 hours as needed for nausea 2/11/17   Felisha Coy MD   isoniazid (NYDRAZID) 300 MG tablet Take 1 tablet (300 mg) by mouth daily Take for 9 months total. You should have your liver function tests drawn monthly while on this medication. 2/11/17   Felisha Coy MD   valGANciclovir (VALCYTE) 450 MG tablet Take 1 tablet (450 mg) by mouth twice a week You should take this medication for 6 months total after your transplant. The dose of this medication may need to be changed as your kidney function improves (ask your Nephrologist). 2/13/17   Felisha Coy MD   amLODIPine (NORVASC) 10 MG tablet Take 1 tablet (10 mg) by mouth daily 2/11/17   Felisha Coy MD   senna-docusate (SENOKOT-S;PERICOLACE) 8.6-50 MG per tablet Take 1-2 tablets by mouth 2 times daily as needed for constipation 2/11/17   Felisha Coy MD   sulfamethoxazole-trimethoprim (BACTRIM/SEPTRA) 400-80 MG per tablet Take 1 tablet by mouth three times a week Take on Monday, Wednesday, and Saturday. The dose of this medication may need to be changed as your kidney function improves (ask your Nephrologist). 2/11/17    Felisha Coy MD   pyridOXINE (VITAMIN B-6) 25 MG tablet Take 1 tablet (25 mg) by mouth daily 2/11/17   Felisha Coy MD   ciprofloxacin (CIPRO) 500 MG tablet Take 1 tablet (500 mg) by mouth every 24 hours 2/11/17   Felisha Coy MD   metoprolol (LOPRESSOR) 50 MG tablet Take 1 tablet (50 mg) by mouth 2 times daily 2/11/17   Felisha Coy MD   mycophenolate (CELLCEPT - GENERIC EQUIVALENT) 250 MG capsule Take 3 capsules (750 mg) by mouth 2 times daily 2/11/17   Felisha Coy MD   polyethylene glycol (MIRALAX) powder Take 17 g by mouth daily as needed.    Reported, Patient   calcium carbonate (TUMS) 500 MG chewable tablet Take 1 chew tab by mouth 3 times daily (with meals).    Reported, Patient       Vitals:  Reviewed    Exam:   GENERAL APPEARANCE: alert and no distress  HENT: mouth without ulcers or lesions  LYMPHATICS: no cervical or supraclavicular nodes  RESP: lungs clear to auscultation - no rales, rhonchi or wheezes  CV: regular rhythm, normal rate, no rub, no murmur  EDEMA: no LE edema bilaterally  ABDOMEN: soft, nondistended, nontender, bowel sounds normal  MS: extremities normal - no gross deformities noted, no evidence of inflammation in joints, no muscle tenderness  SKIN: no rash    Results:   Reviewed

## 2017-02-13 NOTE — MR AVS SNAPSHOT
After Visit Summary   2017    Angelica Romero    MRN: [id number]           Patient Information     Date Of Birth          1965        Visit Information        Provider Department      2017 8:00 AM Thony Montes MD [facility]        Today's Diagnoses     End stage kidney disease (H)    -  1    -donor kidney transplant        Hypertension secondary to other renal disorders        Immunosuppression (H)           Follow-ups after your visit        Your next 10 appointments already scheduled     Mar 07, 2017  9:40 AM CST   (Arrive by 9:25 AM)   Kidney Post Op with Jose Naranjo MD   [facility] Solid Organ Transplant ([facility])    [address] [place] [address] [phone]            Mar 30, 2017  1:50 PM CDT   (Arrive by 1:20 PM)   Return Kidney Transplant with Bill Tejeda MD   [facility] Nephrology ([facility])    [address] [place] [address] [phone]            2017  1:50 PM CDT   (Arrive by 1:20 PM)   Return Kidney Transplant with Bill Tejeda MD   [facility] Nephrology ([facility])    [address] [place] [address] [phone]              Future tests that were ordered for you today     Open Future Orders        Priority Expected Expires Ordered    Protein  random urine Routine  3/16/2017 2017            Who to contact     If you have questions or need follow up information about today's clinic visit or your schedule please contact [facility] PROCEDURE directly at [phone].  Normal or non-critical lab and imaging results will be communicated to you by MyChart, letter or phone within 4 business days after the clinic has received the results. If you do not hear from us within 7 days, please  contact the clinic through [employer] or phone. If you have a critical or abnormal lab result, we will notify you by phone as soon as possible.  Submit refill requests through [employer] or call your pharmacy and they will forward the refill request to us. Please allow 3 business days for your refill to be completed.          Additional Information About Your Visit        [employer] Information     [employer] gives you secure access to your electronic health record. If you see a primary care provider, you can also send messages to your care team and make appointments. If you have questions, please call your primary care clinic.  If you do not have a primary care provider, please call [phone] and they will assist you.        Care EveryWhere ID     This is your Care EveryWhere ID. This could be used by other organizations to access your [place] medical records  [id number]         Blood Pressure from Last 3 Encounters:   17 149/70   17 171/81   17 151/79    Weight from Last 3 Encounters:   17 88.8 kg (195 lb 12.8 oz)   17 92.1 kg (203 lb)   17 91.9 kg (202 lb 9.6 oz)              Today, you had the following     No orders found for display         Today's Medication Changes          These changes are accurate as of: 17 11:59 PM.  If you have any questions, ask your nurse or doctor.               These medicines have changed or have updated prescriptions.        Dose/Directions    * tacrolimus 1 MG capsule   Commonly known as:  PROGRAF - GENERIC EQUIVALENT   This may have changed:  Another medication with the same name was changed. Make sure you understand how and when to take each.   Used for:  -donor kidney transplant        Dose:  3 mg   Take 3 capsules (3 mg) by mouth 2 times daily   Quantity:  120 capsule   Refills:  11       * tacrolimus 0.5 MG capsule   Commonly known as:  PROGRAF - GENERIC EQUIVALENT   This may have changed:  additional instructions   Used for:   Aftercare following organ transplant, Kidney replaced by transplant        Take one capsule twice daily for dose adjustments.   Quantity:  30 capsule   Refills:  3       * Notice:  This list has 2 medication(s) that are the same as other medications prescribed for you. Read the directions carefully, and ask your doctor or other care provider to review them with you.         Where to get your medicines      These medications were sent to [place], [place] - [address]  [address], [facility] [address]    Hours:  TRANSPLANT PHONE NUMBER [phone] Phone:  [phone]     tacrolimus 0.5 MG capsule                Primary Care Provider Office Phone # Fax #    Harry Laboy [phone]       [address] [address]        Thank you!     Thank you for choosing [facility] SPECIAL[facility] PROCEDURE  for your care. Our goal is always to provide you with excellent care. Hearing back from our patients is one way we can continue to improve our services. Please take a few minutes to complete the written survey that you may receive in the mail after your visit with us. Thank you!             Your Updated Medication List - Protect others around you: Learn how to safely use, store and throw away your medicines at www.disposemymeds.org.          This list is accurate as of: 2/13/17 11:59 PM.  Always use your most recent med list.                   Brand Name Dispense Instructions for use    amLODIPine 10 MG tablet    NORVASC    30 tablet    Take 1 tablet (10 mg) by mouth daily       ciprofloxacin 500 MG tablet    CIPRO    5 tablet    Take 1 tablet (500 mg) by mouth every 24 hours       isoniazid 300 MG tablet    NYDRAZID    30 tablet    Take 1 tablet (300 mg) by mouth daily Take for 9 months total. You should have your liver function tests drawn monthly while on this medication.        metoprolol 50 MG tablet    LOPRESSOR    60 tablet    Take 1 tablet (50 mg) by mouth 2 times daily       MIRALAX powder   Generic drug:  polyethylene glycol      Take 17 g by mouth daily as needed.       mycophenolate 250 MG capsule    CELLCEPT - GENERIC EQUIVALENT    180 capsule    Take 3 capsules (750 mg) by mouth 2 times daily       ondansetron 4 MG ODT tab    ZOFRAN-ODT    30 tablet    Take 1 tablet (4 mg) by mouth every 6 hours as needed for nausea       oxyCODONE-acetaminophen 5-325 MG per tablet    PERCOCET    30 tablet    Take 1-2 tablets by mouth every 4 hours as needed for moderate to severe pain This medication contains Tylenol (acetaminophen). Do not take more than 4,000 mg of Tylenol (acetaminophen) from all sources to prevent liver damage.       pyridOXINE 25 MG tablet    vitamin B-6    30 tablet    Take 1 tablet (25 mg) by mouth daily       senna-docusate 8.6-50 MG per tablet    SENOKOT-S;PERICOLACE    100 tablet    Take 1-2 tablets by mouth 2 times daily as needed for constipation       sulfamethoxazole-trimethoprim 400-80 MG per tablet    BACTRIM/SEPTRA    12 tablet    Take 1 tablet by mouth three times a week Take on Monday, Wednesday, and Saturday. The dose of this medication may need to be changed as your kidney function improves (ask your Nephrologist).       * tacrolimus 1 MG capsule    PROGRAF - GENERIC EQUIVALENT    120 capsule    Take 3 capsules (3 mg) by mouth 2 times daily       * tacrolimus 0.5 MG capsule    PROGRAF - GENERIC EQUIVALENT    30 capsule    Take one capsule twice daily for dose adjustments.       TUMS 500 MG chewable tablet   Generic drug:  calcium carbonate      Take 1 chew tab by mouth 3 times daily (with meals).       valGANciclovir 450 MG tablet    VALCYTE    8 tablet    Take 1 tablet (450 mg) by mouth twice a week You should take this medication for 6 months total after your transplant. The dose of this medication may need to be changed as your kidney function improves  (ask your Nephrologist).       * Notice:  This list has 2 medication(s) that are the same as other medications prescribed for you. Read the directions carefully, and ask your doctor or other care provider to review them with you.

## 2017-02-13 NOTE — PROGRESS NOTES
Patient is a transplant patient and will be followed by the transplant team for follow up        Discharge Summary  Transplant Surgery     Date of Admission: 2/8/2017  Date of Discharge: 2/11/2017 8:05 PM  Discharging Provider: Felisha Coy  Attending Provider: Jose Naranjo

## 2017-02-13 NOTE — MR AVS SNAPSHOT
After Visit Summary   2/13/2017    Angelica Romero    MRN: [id number]           Patient Information     Date Of Birth          1965        Visit Information        Provider Department      2/13/2017 7:00 AM UC 42 ATC; UC SPEC INFUSION [facility]        Today's Diagnoses     Aftercare following organ transplant        Kidney replaced by transplant           Follow-ups after your visit        Your next 10 appointments already scheduled     Feb 14, 2017  7:00 AM CST   (Arrive by 6:45 AM)   New Transplant Visit with UC SPEC INFUSION, UC 43 ATC   [facility] Special[facility] Procedure ([facility])    [address] [facility] [address]   [phone]            Feb 14, 2017  8:00 AM CST   (Arrive by 7:45 AM)   Kidney Transplant Discharge with Thony Montes MD   [facility] Special[facility] and Procedure ([facility])    [address]  [address] [facility] [address]   [phone]            Feb 15, 2017  7:00 AM CST   (Arrive by 6:45 AM)   New Transplant Visit with UC SPEC INFUSION, UC 43 ATC   [facility] Procedure ([facility])    [address]  [address] [facility] [address]   [phone]            Feb 15, 2017  8:00 AM CST   (Arrive by 7:45 AM)   Kidney Transplant Discharge with Thony Montes MD   [facility] Procedure ([facility])    [address]  [address] [facility] [address]   [phone]            Mar 07, 2017  9:40 AM CST   (Arrive by 9:25 AM)   Kidney Post Op with Jose Naranjo MD   [facility] Solid Organ Transplant ([facility])    [address]  [address] [facility] [address]   [phone]            Mar 30, 2017  1:50 PM CDT    (Arrive by 1:20 PM)   Return Kidney Transplant with Bill Tejeda MD   [facility] Nephrology ([facility])    [address]  [address] [facility] [id number] [phone]            Jun 08, 2017  1:50 PM CDT   (Arrive by 1:20 PM)   Return Kidney Transplant with Bill Tejeda MD   [facility] Nephrology ([facility])    [address] [id number] [phone]              Future tests that were ordered for you today     Open Future Orders        Priority Expected Expires Ordered    Protein  random urine Routine 2/13/2017 3/14/2017 2/12/2017            Who to contact     If you have questions or need follow up information about today's clinic visit or your schedule please contact [facility] SPECIALTY AND PROCEDURE directly at [phone].  Normal or non-critical lab and imaging results will be communicated to you by MyChart, letter or phone within 4 business days after the clinic has received the results. If you do not hear from us within 7 days, please contact the clinic through [employer] or phone. If you have a critical or abnormal lab result, we will notify you by phone as soon as possible.  Submit refill requests through [facility] [employer] or call your pharmacy and they will forward the refill request to us. Please allow 3 business days for your refill to be completed.          Additional Information About Your Visit        [employer] Information     [employer] [employer] gives you secure access to your electronic health record. If you see a primary care provider, you can also send messages to your care team and make appointments. If you have questions, please call your primary care clinic.  If you do not have a primary care provider, please call [phone] and they will assist you.        Care EveryWhere ID     This is your Care EveryWhere ID. This could be used by other organizations to access your [place]  medical records  [id number]        Your Vitals Were     Temperature Pulse Oximetry                96.9  F (36.1  C) (Oral) 97%           Blood Pressure from Last 3 Encounters:   17 171/81   17 151/79   17 165/77    Weight from Last 3 Encounters:   17 92.1 kg (203 lb)   17 91.9 kg (202 lb 9.6 oz)   16 89 kg (196 lb 3.2 oz)              We Performed the Following     Basic metabolic panel     CBC with platelets     Magnesium     Mycophenolic acid     Phosphorus     Tacrolimus level     WBC Differential          Today's Medication Changes          These changes are accurate as of: 17  5:07 PM.  If you have any questions, ask your nurse or doctor.               These medicines have changed or have updated prescriptions.        Dose/Directions    * tacrolimus 1 MG capsule   Commonly known as:  PROGRAF - GENERIC EQUIVALENT   This may have changed:  Another medication with the same name was changed. Make sure you understand how and when to take each.   Used for:  -donor kidney transplant        Dose:  3 mg   Take 3 capsules (3 mg) by mouth 2 times daily   Quantity:  120 capsule   Refills:  11       * tacrolimus 0.5 MG capsule   Commonly known as:  PROGRAF - GENERIC EQUIVALENT   This may have changed:  additional instructions   Used for:  Aftercare following organ transplant, Kidney replaced by transplant        Take one capsule twice daily for dose adjustments.   Quantity:  30 capsule   Refills:  3       * Notice:  This list has 2 medication(s) that are the same as other medications prescribed for you. Read the directions carefully, and ask your doctor or other care provider to review them with you.         Where to get your medicines      These medications were sent to [address] [place] [address] [address] [place] [address]    Hours:  TRANSPLANT PHONE NUMBER [phone] Phone:  [phone]      tacrolimus 0.5 MG capsule                Primary Care Provider Office Phone # Fax #    Harry Laboy [phone]       [address] [address]        Thank you!     Thank you for choosing [facility]  for your care. Our goal is always to provide you with excellent care. Hearing back from our patients is one way we can continue to improve our services. Please take a few minutes to complete the written survey that you may receive in the mail after your visit with us. Thank you!             Your Updated Medication List - Protect others around you: Learn how to safely use, store and throw away your medicines at www.disposemymeds.org.          This list is accurate as of: 2/13/17  5:07 PM.  Always use your most recent med list.                   Brand Name Dispense Instructions for use    amLODIPine 10 MG tablet    NORVASC    30 tablet    Take 1 tablet (10 mg) by mouth daily       ciprofloxacin 500 MG tablet    CIPRO    5 tablet    Take 1 tablet (500 mg) by mouth every 24 hours       isoniazid 300 MG tablet    NYDRAZID    30 tablet    Take 1 tablet (300 mg) by mouth daily Take for 9 months total. You should have your liver function tests drawn monthly while on this medication.       metoprolol 50 MG tablet    LOPRESSOR    60 tablet    Take 1 tablet (50 mg) by mouth 2 times daily       MIRALAX powder   Generic drug:  polyethylene glycol      Take 17 g by mouth daily as needed.       mycophenolate 250 MG capsule    CELLCEPT - GENERIC EQUIVALENT    180 capsule    Take 3 capsules (750 mg) by mouth 2 times daily       ondansetron 4 MG ODT tab    ZOFRAN-ODT    30 tablet    Take 1 tablet (4 mg) by mouth every 6 hours as needed for nausea       oxyCODONE-acetaminophen 5-325 MG per tablet    PERCOCET    30 tablet    Take 1-2 tablets by mouth every 4 hours as needed for moderate to severe pain This medication contains Tylenol  (acetaminophen). Do not take more than 4,000 mg of Tylenol (acetaminophen) from all sources to prevent liver damage.       pyridOXINE 25 MG tablet    vitamin B-6    30 tablet    Take 1 tablet (25 mg) by mouth daily       senna-docusate 8.6-50 MG per tablet    SENOKOT-S;PERICOLACE    100 tablet    Take 1-2 tablets by mouth 2 times daily as needed for constipation       sulfamethoxazole-trimethoprim 400-80 MG per tablet    BACTRIM/SEPTRA    12 tablet    Take 1 tablet by mouth three times a week Take on Monday, Wednesday, and Saturday. The dose of this medication may need to be changed as your kidney function improves (ask your Nephrologist).       * tacrolimus 1 MG capsule    PROGRAF - GENERIC EQUIVALENT    120 capsule    Take 3 capsules (3 mg) by mouth 2 times daily       * tacrolimus 0.5 MG capsule    PROGRAF - GENERIC EQUIVALENT    30 capsule    Take one capsule twice daily for dose adjustments.       TUMS 500 MG chewable tablet   Generic drug:  calcium carbonate      Take 1 chew tab by mouth 3 times daily (with meals).       valGANciclovir 450 MG tablet    VALCYTE    8 tablet    Take 1 tablet (450 mg) by mouth twice a week You should take this medication for 6 months total after your transplant. The dose of this medication may need to be changed as your kidney function improves (ask your Nephrologist).       * Notice:  This list has 2 medication(s) that are the same as other medications prescribed for you. Read the directions carefully, and ask your doctor or other care provider to review them with you.

## 2017-02-13 NOTE — PROGRESS NOTES
"Angelica Romero came to [facility] today for a lab and assess following a kidney transplant on 2/8.      Discharge date: 2/11  Transplant coordinator: Nereida Burnham   Phone number patient can be reached at: [phone]        Physical Assessment:  See physical assessment located under \"Document Flowsheets\".  Incision site: c/d/i staples. A couple staples notes to be falling out; removed four staples and replaced with steri-strips.  Lines: N/A   Reis: N/A  Urine clarity: clear/yellow per pt report.   Hydration: pt verbalized understanding of fluid intake recommendations, reports no issues meeting 2 L/day.   Nutrition: Appetite suppressed but no N/V.   Last BM: Constipation resolved.   Pain: Taking pain meds ~ Q6H for incision \"soreness\"      Laboratory tests: Standard labs drawn.      Plan of care for today: Labs drawn and pt assessed. Post transplant education checklist reviewed with pt and spouse. Medications thoroughly reviewed. Pt seen by Dr. Montes, coordinator Nereida Burnham, and specialty pharmacist. Pt reports she picked up Miralax at local pharmacy yesterday afternoon and constipation has not been an ongoing issue; has had multiple BMs since. BP elevated 190s systolic prior to morning medications; recheck 2 hours after med admin in the 170s- per Dr. Montes okay to d/c today and return tomorrow.      Medication changes: none     Medications administered:  None.      Patient education:     The following teaching topics were addressed: Importance of drinking 2L of non-caffeinated fluids daily, Incisional care, Signs/symptoms of infection, Good handwashing, Medications (purposes, doses and times of administration), Phone numbers to call with concenrs (Transplant coordinator, [facility] and [facility]) and Plan of care  Patient and spouse verbalized understanding and all questions answered.     Drug level:  Tac level today reviewed with Dr Montes who gave orders to remain on current dose, 4 mg Q12 H. Patient was updated with " this information and verbalized understanding.     Discharge Plan     Pt will follow up with [facility] tomorrow for LBA  Discharge instructions reviewed with patient: YES  Patient/Representative verbalized understanding, all questions answered: YES     Discharged from unit at   with whom: spouse to hotel.     Jayshree Park RN

## 2017-02-14 ENCOUNTER — INFUSION THERAPY VISIT (OUTPATIENT)
Dept: INFUSION THERAPY | Facility: CLINIC | Age: 52
End: 2017-02-14
Attending: SURGERY
Payer: MEDICARE

## 2017-02-14 ENCOUNTER — OFFICE VISIT (OUTPATIENT)
Dept: INFUSION THERAPY | Facility: CLINIC | Age: 52
End: 2017-02-14
Attending: INTERNAL MEDICINE
Payer: MEDICARE

## 2017-02-14 ENCOUNTER — TELEPHONE (OUTPATIENT)
Dept: TRANSPLANT | Facility: CLINIC | Age: 52
End: 2017-02-14

## 2017-02-14 VITALS
RESPIRATION RATE: 16 BRPM | BODY MASS INDEX: 35.81 KG/M2 | DIASTOLIC BLOOD PRESSURE: 70 MMHG | SYSTOLIC BLOOD PRESSURE: 149 MMHG | HEART RATE: 72 BPM | TEMPERATURE: 97.7 F | WEIGHT: 195.8 LBS

## 2017-02-14 DIAGNOSIS — Z48.298 AFTERCARE FOLLOWING ORGAN TRANSPLANT: Primary | ICD-10-CM

## 2017-02-14 DIAGNOSIS — Z94.0 DECEASED-DONOR KIDNEY TRANSPLANT: Primary | ICD-10-CM

## 2017-02-14 DIAGNOSIS — Z48.298 AFTERCARE FOLLOWING ORGAN TRANSPLANT: ICD-10-CM

## 2017-02-14 DIAGNOSIS — N18.6 END STAGE KIDNEY DISEASE (H): ICD-10-CM

## 2017-02-14 DIAGNOSIS — Z94.0 KIDNEY REPLACED BY TRANSPLANT: ICD-10-CM

## 2017-02-14 DIAGNOSIS — I15.1 HYPERTENSION SECONDARY TO OTHER RENAL DISORDERS: ICD-10-CM

## 2017-02-14 DIAGNOSIS — D84.9 IMMUNOSUPPRESSION (H): ICD-10-CM

## 2017-02-14 LAB
ANION GAP SERPL CALCULATED.3IONS-SCNC: 11 MMOL/L (ref 3–14)
BASOPHILS # BLD AUTO: 0 10E9/L (ref 0–0.2)
BASOPHILS NFR BLD AUTO: 0 %
BUN SERPL-MCNC: 78 MG/DL (ref 7–30)
CALCIUM SERPL-MCNC: 9.4 MG/DL (ref 8.5–10.1)
CHLORIDE SERPL-SCNC: 109 MMOL/L (ref 94–109)
CO2 SERPL-SCNC: 20 MMOL/L (ref 20–32)
CREAT SERPL-MCNC: 5.92 MG/DL (ref 0.52–1.04)
CREAT UR-MCNC: 88 MG/DL
DIFFERENTIAL METHOD BLD: ABNORMAL
EOSINOPHIL # BLD AUTO: 0.1 10E9/L (ref 0–0.7)
EOSINOPHIL NFR BLD AUTO: 0.7 %
ERYTHROCYTE [DISTWIDTH] IN BLOOD BY AUTOMATED COUNT: 13.5 % (ref 10–15)
GFR SERPL CREATININE-BSD FRML MDRD: 7 ML/MIN/1.7M2
GLUCOSE SERPL-MCNC: 160 MG/DL (ref 70–99)
HCT VFR BLD AUTO: 28.8 % (ref 35–47)
HGB BLD-MCNC: 10 G/DL (ref 11.7–15.7)
IMM GRANULOCYTES # BLD: 0.1 10E9/L (ref 0–0.4)
IMM GRANULOCYTES NFR BLD: 0.9 %
LYMPHOCYTES # BLD AUTO: 0.2 10E9/L (ref 0.8–5.3)
LYMPHOCYTES NFR BLD AUTO: 2.4 %
MAGNESIUM SERPL-MCNC: 2.1 MG/DL (ref 1.6–2.3)
MCH RBC QN AUTO: 32.7 PG (ref 26.5–33)
MCHC RBC AUTO-ENTMCNC: 34.7 G/DL (ref 31.5–36.5)
MCV RBC AUTO: 94 FL (ref 78–100)
MONOCYTES # BLD AUTO: 0.2 10E9/L (ref 0–1.3)
MONOCYTES NFR BLD AUTO: 3.6 %
MYCOPHENOLATE SERPL LC/MS/MS-MCNC: 0.57 MG/L (ref 1–3.5)
MYCOPHENOLATE-G SERPL LC/MS/MS-MCNC: 216 MG/L (ref 30–95)
NEUTROPHILS # BLD AUTO: 6.2 10E9/L (ref 1.6–8.3)
NEUTROPHILS NFR BLD AUTO: 92.4 %
NRBC # BLD AUTO: 0 10*3/UL
NRBC BLD AUTO-RTO: 0 /100
PHOSPHATE SERPL-MCNC: 4.7 MG/DL (ref 2.5–4.5)
PLATELET # BLD AUTO: 159 10E9/L (ref 150–450)
POTASSIUM SERPL-SCNC: 4.1 MMOL/L (ref 3.4–5.3)
PROT UR-MCNC: 0.71 G/L
PROT/CREAT 24H UR: 0.81 G/G CR (ref 0–0.2)
RBC # BLD AUTO: 3.06 10E12/L (ref 3.8–5.2)
SODIUM SERPL-SCNC: 140 MMOL/L (ref 133–144)
TACROLIMUS BLD-MCNC: 7.1 UG/L (ref 5–15)
TME LAST DOSE: ABNORMAL H
TME LAST DOSE: NORMAL H
WBC # BLD AUTO: 6.7 10E9/L (ref 4–11)

## 2017-02-14 PROCEDURE — 80197 ASSAY OF TACROLIMUS: CPT | Performed by: INTERNAL MEDICINE

## 2017-02-14 PROCEDURE — 84100 ASSAY OF PHOSPHORUS: CPT | Performed by: INTERNAL MEDICINE

## 2017-02-14 PROCEDURE — 36415 COLL VENOUS BLD VENIPUNCTURE: CPT

## 2017-02-14 PROCEDURE — 80048 BASIC METABOLIC PNL TOTAL CA: CPT | Performed by: INTERNAL MEDICINE

## 2017-02-14 PROCEDURE — 99215 OFFICE O/P EST HI 40 MIN: CPT | Mod: ZF

## 2017-02-14 PROCEDURE — 40000809 ZZH STATISTIC NO DOCUMENTATION TO SUPPORT CHARGE

## 2017-02-14 PROCEDURE — 84156 ASSAY OF PROTEIN URINE: CPT | Performed by: INTERNAL MEDICINE

## 2017-02-14 PROCEDURE — 83735 ASSAY OF MAGNESIUM: CPT | Performed by: INTERNAL MEDICINE

## 2017-02-14 PROCEDURE — 85025 COMPLETE CBC W/AUTO DIFF WBC: CPT | Performed by: INTERNAL MEDICINE

## 2017-02-14 NOTE — MR AVS SNAPSHOT
After Visit Summary   2017    Angelica Romero    MRN: [id number]           Patient Information     Date Of Birth          1965        Visit Information        Provider Department      2017 7:00 AM UC 43 ATC; UC SPEC INFUSION [facility] Specialty and Procedure        Today's Diagnoses     -donor kidney transplant    -  1    Aftercare following organ transplant        Kidney replaced by transplant           Follow-ups after your visit        Your next 10 appointments already scheduled     Mar 07, 2017  9:40 AM CST   (Arrive by 9:25 AM)   Kidney Post Op with Jose Naranjo MD   [facility] Solid Organ Transplant ([facility])    [address] [place] [id number] [phone]            Mar 30, 2017  1:50 PM CDT   (Arrive by 1:20 PM)   Return Kidney Transplant with Bill Tejeda MD   [facility] Nephrology ([facility])    [address] [place] [id number] [phone]            2017  1:50 PM CDT   (Arrive by 1:20 PM)   Return Kidney Transplant with Bill Tejeda MD   [facility] Nephrology ([facility])    [address] [place] [id number] [phone]              Future tests that were ordered for you today     Open Future Orders        Priority Expected Expires Ordered    Protein  random urine Routine  3/16/2017 2017            Who to contact     If you have questions or need follow up information about today's clinic visit or your schedule please contact [facility] SPECIALTY AND PROCEDURE directly at [phone].  Normal or non-critical lab and imaging results will be communicated to you by MyChart, letter or phone within 4 business days after the clinic has received the results. If you do not hear from us within 7 days, please contact the clinic through [employer]  or phone. If you have a critical or abnormal lab result, we will notify you by phone as soon as possible.  Submit refill requests through [employer] or call your pharmacy and they will forward the refill request to us. Please allow 3 business days for your refill to be completed.          Additional Information About Your Visit        [employer] Information     [employer] gives you secure access to your electronic health record. If you see a primary care provider, you can also send messages to your care team and make appointments. If you have questions, please call your primary care clinic.  If you do not have a primary care provider, please call [phone] and they will assist you.        Care EveryWhere ID     This is your Care EveryWhere ID. This could be used by other organizations to access your [place] medical records  [id number]        Your Vitals Were     Pulse Temperature Respirations BMI (Body Mass Index)          72 97.7  F (36.5  C) (Oral) 16 35.81 kg/m2         Blood Pressure from Last 3 Encounters:   02/14/17 149/70   02/13/17 171/81   02/12/17 151/79    Weight from Last 3 Encounters:   02/14/17 88.8 kg (195 lb 12.8 oz)   02/12/17 92.1 kg (203 lb)   02/11/17 91.9 kg (202 lb 9.6 oz)              We Performed the Following     Basic metabolic panel     CBC with platelets differential     Creatinine urine calculation only     Magnesium     Phosphorus     Protein  random urine     Tacrolimus level        Primary Care Provider Office Phone # Fax #    Harry Laboy [phone]       [address] [address]        Thank you!     Thank you for choosing [facility] SPECIAL[facility] PROCEDURE  for your care. Our goal is always to provide you with excellent care. Hearing back from our patients is one way we can continue to improve our services. Please take a few minutes to complete the written survey that you may receive in the mail after  your visit with us. Thank you!             Your Updated Medication List - Protect others around you: Learn how to safely use, store and throw away your medicines at www.disposemymeds.org.          This list is accurate as of: 2/14/17  9:45 AM.  Always use your most recent med list.                   Brand Name Dispense Instructions for use    amLODIPine 10 MG tablet    NORVASC    30 tablet    Take 1 tablet (10 mg) by mouth daily       ciprofloxacin 500 MG tablet    CIPRO    5 tablet    Take 1 tablet (500 mg) by mouth every 24 hours       isoniazid 300 MG tablet    NYDRAZID    30 tablet    Take 1 tablet (300 mg) by mouth daily Take for 9 months total. You should have your liver function tests drawn monthly while on this medication.       metoprolol 50 MG tablet    LOPRESSOR    60 tablet    Take 1 tablet (50 mg) by mouth 2 times daily       MIRALAX powder   Generic drug:  polyethylene glycol      Take 17 g by mouth daily as needed.       mycophenolate 250 MG capsule    CELLCEPT - GENERIC EQUIVALENT    180 capsule    Take 3 capsules (750 mg) by mouth 2 times daily       ondansetron 4 MG ODT tab    ZOFRAN-ODT    30 tablet    Take 1 tablet (4 mg) by mouth every 6 hours as needed for nausea       oxyCODONE-acetaminophen 5-325 MG per tablet    PERCOCET    30 tablet    Take 1-2 tablets by mouth every 4 hours as needed for moderate to severe pain This medication contains Tylenol (acetaminophen). Do not take more than 4,000 mg of Tylenol (acetaminophen) from all sources to prevent liver damage.       pyridOXINE 25 MG tablet    vitamin B-6    30 tablet    Take 1 tablet (25 mg) by mouth daily       senna-docusate 8.6-50 MG per tablet    SENOKOT-S;PERICOLACE    100 tablet    Take 1-2 tablets by mouth 2 times daily as needed for constipation       sulfamethoxazole-trimethoprim 400-80 MG per tablet    BACTRIM/SEPTRA    12 tablet    Take 1 tablet by mouth three times a week Take on Monday, Wednesday, and Saturday. The dose of this  medication may need to be changed as your kidney function improves (ask your Nephrologist).       * tacrolimus 1 MG capsule    PROGRAF - GENERIC EQUIVALENT    120 capsule    Take 3 capsules (3 mg) by mouth 2 times daily       * tacrolimus 0.5 MG capsule    PROGRAF - GENERIC EQUIVALENT    30 capsule    Take one capsule twice daily for dose adjustments.       TUMS 500 MG chewable tablet   Generic drug:  calcium carbonate      Take 1 chew tab by mouth 3 times daily (with meals).       valGANciclovir 450 MG tablet    VALCYTE    8 tablet    Take 1 tablet (450 mg) by mouth twice a week You should take this medication for 6 months total after your transplant. The dose of this medication may need to be changed as your kidney function improves (ask your Nephrologist).       * Notice:  This list has 2 medication(s) that are the same as other medications prescribed for you. Read the directions carefully, and ask your doctor or other care provider to review them with you.

## 2017-02-14 NOTE — MR AVS SNAPSHOT
After Visit Summary   2/14/2017    Angelica Romero    MRN: [id number]           Patient Information     Date Of Birth          1965        Visit Information        Provider Department      2/14/2017 8:00 AM Thony Montes MD [facility] Specialty and Procedure        Today's Diagnoses     Aftercare following organ transplant    -  1    End stage kidney disease (H)        Immunosuppression (H)        Hypertension secondary to other renal disorders           Follow-ups after your visit        Your next 10 appointments already scheduled     Mar 07, 2017  9:40 AM CST   (Arrive by 9:25 AM)   Kidney Post Op with Jose Naranjo MD   [facility] Solid Organ Transplant ([facility])    [address] [place] [id number] [phone]            Mar 30, 2017  1:50 PM CDT   (Arrive by 1:20 PM)   Return Kidney Transplant with Bill Tejeda MD   [facility] Nephrology ([facility])    [address] [place] [id number] [phone]            Jun 08, 2017  1:50 PM CDT   (Arrive by 1:20 PM)   Return Kidney Transplant with Bill Tejeda MD   [facility] Nephrology ([facility])    [address] [place] [id number] [phone]              Future tests that were ordered for you today     Open Future Orders        Priority Expected Expires Ordered    Protein  random urine Routine  3/16/2017 2/14/2017            Who to contact     If you have questions or need follow up information about today's clinic visit or your schedule please contact [facility] SPECIALTY AND PROCEDURE directly at [phone].  Normal or non-critical lab and imaging results will be communicated to you by MyChart, letter or phone within 4 business days after the clinic has received the results. If you do not hear from us within 7 days,  please contact the clinic through [employer] or phone. If you have a critical or abnormal lab result, we will notify you by phone as soon as possible.  Submit refill requests through [employer] or call your pharmacy and they will forward the refill request to us. Please allow 3 business days for your refill to be completed.          Additional Information About Your Visit        [employer] Information     [employer] gives you secure access to your electronic health record. If you see a primary care provider, you can also send messages to your care team and make appointments. If you have questions, please call your primary care clinic.  If you do not have a primary care provider, please call [phone] and they will assist you.        Care EveryWhere ID     This is your Care EveryWhere ID. This could be used by other organizations to access your [place] medical records  [id number]         Blood Pressure from Last 3 Encounters:   02/14/17 149/70   02/13/17 171/81   02/12/17 151/79    Weight from Last 3 Encounters:   02/14/17 88.8 kg (195 lb 12.8 oz)   02/12/17 92.1 kg (203 lb)   02/11/17 91.9 kg (202 lb 9.6 oz)               Primary Care Provider Office Phone # Fax #    Harry Laboy [phone]       [address] [address]        Thank you!     Thank you for choosing [facility] SPECIALTY AND PROCEDURE  for your care. Our goal is always to provide you with excellent care. Hearing back from our patients is one way we can continue to improve our services. Please take a few minutes to complete the written survey that you may receive in the mail after your visit with us. Thank you!             Your Updated Medication List - Protect others around you: Learn how to safely use, store and throw away your medicines at www.disposemymeds.org.          This list is accurate as of: 2/14/17 11:59 PM.  Always use your most recent med list.                    Brand Name Dispense Instructions for use    amLODIPine 10 MG tablet    NORVASC    30 tablet    Take 1 tablet (10 mg) by mouth daily       ciprofloxacin 500 MG tablet    CIPRO    5 tablet    Take 1 tablet (500 mg) by mouth every 24 hours       isoniazid 300 MG tablet    NYDRAZID    30 tablet    Take 1 tablet (300 mg) by mouth daily Take for 9 months total. You should have your liver function tests drawn monthly while on this medication.       metoprolol 50 MG tablet    LOPRESSOR    60 tablet    Take 1 tablet (50 mg) by mouth 2 times daily       MIRALAX powder   Generic drug:  polyethylene glycol      Take 17 g by mouth daily as needed.       mycophenolate 250 MG capsule    CELLCEPT - GENERIC EQUIVALENT    180 capsule    Take 3 capsules (750 mg) by mouth 2 times daily       ondansetron 4 MG ODT tab    ZOFRAN-ODT    30 tablet    Take 1 tablet (4 mg) by mouth every 6 hours as needed for nausea       oxyCODONE-acetaminophen 5-325 MG per tablet    PERCOCET    30 tablet    Take 1-2 tablets by mouth every 4 hours as needed for moderate to severe pain This medication contains Tylenol (acetaminophen). Do not take more than 4,000 mg of Tylenol (acetaminophen) from all sources to prevent liver damage.       pyridOXINE 25 MG tablet    vitamin B-6    30 tablet    Take 1 tablet (25 mg) by mouth daily       senna-docusate 8.6-50 MG per tablet    SENOKOT-S;PERICOLACE    100 tablet    Take 1-2 tablets by mouth 2 times daily as needed for constipation       sulfamethoxazole-trimethoprim 400-80 MG per tablet    BACTRIM/SEPTRA    12 tablet    Take 1 tablet by mouth three times a week Take on Monday, Wednesday, and Saturday. The dose of this medication may need to be changed as your kidney function improves (ask your Nephrologist).       * tacrolimus 1 MG capsule    PROGRAF - GENERIC EQUIVALENT    120 capsule    Take 3 capsules (3 mg) by mouth 2 times daily       * tacrolimus 0.5 MG capsule    PROGRAF - GENERIC EQUIVALENT    30  capsule    Take one capsule twice daily for dose adjustments.       TUMS 500 MG chewable tablet   Generic drug:  calcium carbonate      Take 1 chew tab by mouth 3 times daily (with meals).       valGANciclovir 450 MG tablet    VALCYTE    8 tablet    Take 1 tablet (450 mg) by mouth twice a week You should take this medication for 6 months total after your transplant. The dose of this medication may need to be changed as your kidney function improves (ask your Nephrologist).       * Notice:  This list has 2 medication(s) that are the same as other medications prescribed for you. Read the directions carefully, and ask your doctor or other care provider to review them with you.

## 2017-02-14 NOTE — PROGRESS NOTES
"Angelica Romero came to [facility] today for a lab and assess following a kidney transplant on 2/8.       Discharge date: 2/11  Transplant coordinator: Nereida Burnham   Phone number patient can be reached at: [phone]          Physical Assessment:  See physical assessment located under \"Document Flowsheets\".  Incision site: c/d/i staples and three steri strips.   Lines: N/A   Reis: N/A  Urine clarity: clear/yellow per pt report, good UOP. Protein/ creat ratio urine sent to lab.   Hydration: pt verbalized understanding of fluid intake recommendations, reports no issues meeting 2 L/day.   Nutrition: Appetite suppressed but no N/V; given boost supplemental shake today, pt reports she will purchase some for home.   Last BM: Constipation resolved.   Pain: Taking pain meds for incision \"soreness\"       Laboratory tests: Standard labs drawn.       Plan of care for today: Labs drawn and pt assessed. Post transplant education checklist reviewed with pt and spouse. Medications reviewed. Pt seen by Dr. Montes. Surgery fellow paged per MD request to look at incision. Three small ~2 cm areas closer to inferior portion of the incision are slightly open due to staples falling out, no s/s infection. Surgery fellow returned paged but is unable to come to [facility] to assess; unable to reach Surgery NP. Dr. Montes notified that surgery was unable to assess. Steri-strips reenforced by writer, pt provided additional steri-strips for home and was instructed on home care of incision; pt verbalized understanding.      Medication changes: none      Medications administered:  None.       Patient education:      The following teaching topics were addressed: Importance of drinking 2L of non-caffeinated fluids daily, Incisional care, Signs/symptoms of infection, Good handwashing, Medications (purposes, doses and times of administration), Phone numbers to call with concenrs (Transplant coordinator, [facility] and [facility]) and Plan of care  Patient and " spouse verbalized understanding and all questions answered.      Drug level:  Did not review tac level with provider as MD was unable to be reached. Patient was told during appt to continue with current dose unless she hear otherwise.   Discharge Plan      Pt will follow up with [facility] ( [phone]). Writer spoke with Margarita at home care agency who confirmed initiation of services starting tomorrow, 2/15.  Discharge instructions reviewed with patient: YES  Patient/Representative verbalized understanding, all questions answered: YES      Discharged from unit at   with whom: spouse to home in [place].       Jayshree Park RN

## 2017-02-14 NOTE — PROGRESS NOTES
ASSESSMENT AND PLAN:   1.  End-stage kidney disease, status post  donor kidney transplant complicated by slightly slow graft function.   2.  Immunosuppression management.   3.  Anemia of chronic kidney disease.   4.  Renal osteodystrophy.   5.  Latent TB.   6.  Opportunistic infection prophylaxis.      DISCUSSION:  Overall, Ms. Angelica Romero is a delightful 51-year-old lady with end-stage kidney disease.  She underwent a  donor kidney transplant on 2017.  Her postoperative course was notable for slow graft function.  Her creatinine came down slightly, and peaked at 7.8 mg/dL.  Today, it is down in the 6 range.  Her immunosuppression appears to be well tolerated.  Currently on CellCept and Prograf.  We will continue to monitor the levels closely and keep her goal of Prograf level around 8, and eventually to 10 in the first 3 months, and MPA between 2 and 3.  In regards to her hypertension, she is currently on her home regimen.  Blood pressure is slightly generous, but better at home.  I asked her to keep a log, and if the blood pressure remains elevated, we will consider adding Coreg in place of metoprolol.  In regards to her renal osteodystrophy, we will continue her current regimen.  In regards to her latent TB, she is currently on isoniazid therapy 300 mg daily with vitamin B6, and she will continue this regimen for 9 months, and will monitor her liver function tests closely.  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  Therefore, her Valcyte prophylaxis will be extended for a full 6 months.  She will return to clinic in the a.m., and we will continue to monitor her closely.  Of note, her constipation was relieved with the interventions yesterday.   Ok for discharged. I called her and I raised her Prograf 3.5 mg po bid.     REASON FOR VISIT:  Immunosuppression management and followup post-kidney transplant.       HISTORY OF PRESENT ILLNESS:  Ms. Angelica Romero is a 51-year-old lady with end-stage kidney disease.  She has been on dialysis for over 5 years.  She dialyzed near home via fistula.  She was tolerating dialysis well.  She underwent a  donor kidney transplant on 2017 postoperatively.  Her allograft was slightly slow; however, she did not require any dialysis.  She denies any pain or pressure.  No shortness of breath.  No nausea, vomiting or diarrhea.  Her appetite is slowly improving.  She was noted to have a positive QuantiFERON Gold, and since she had never been on INH therapy this was started during her hospitalization, and she appears to be tolerating this fairly well.  Her LFTs were checked on , and were essentially normal.  Her original disease was thought to be related to hypertension.     Had couple of loose staples we discussed it with the surgeons. She has no specific complaints today.          Transplant Hx:       Tx: DDKT  Date: 2017       Present Maintenance IS: Tacrolimus and Mycophenolate mofetil       Baseline Creatinine: TBD       Recent DSA: No         Biopsy: No    Home BP: 140s to 150s.      ROS:   A comprehensive review of systems was obtained and negative, except as noted in the HPI or PMH.    Active Medical Problems:  Patient Active Problem List   Diagnosis     Hypertension     Obesity, unspecified     End stage kidney disease (H)     -donor kidney transplant     Immunosuppression (H)     Aftercare following organ transplant       Personal Hx:  Social History     Social History     Marital status:      Spouse name: N/A     Number of children: N/A     Years of education: N/A     Occupational History     Not on file.     Social History Main Topics     Smoking status: Former Smoker     Packs/day: 0.20     Years: 15.00     Types: Cigarettes     Quit date: 2016     Smokeless tobacco: Never Used     Alcohol use 1.8 oz/week     3 Standard drinks or equivalent  per week     Drug use: No     Sexual activity: Not on file     Other Topics Concern     Not on file     Social History Narrative       Allergies:  No Known Allergies    Medications:  Prior to Admission medications    Medication Sig Start Date End Date Taking? Authorizing Provider   tacrolimus (PROGRAF - GENERIC EQUIVALENT) 0.5 MG capsule Take one capsule twice daily for dose adjustments. 2/13/17   Bill Tejeda MD   tacrolimus (PROGRAF - GENERIC EQUIVALENT) 1 MG capsule Take 3 capsules (3 mg) by mouth 2 times daily 2/12/17   Bill Tejeda MD   oxyCODONE-acetaminophen (PERCOCET) 5-325 MG per tablet Take 1-2 tablets by mouth every 4 hours as needed for moderate to severe pain This medication contains Tylenol (acetaminophen). Do not take more than 4,000 mg of Tylenol (acetaminophen) from all sources to prevent liver damage. 2/11/17   Felisha Coy MD   ondansetron (ZOFRAN-ODT) 4 MG ODT tab Take 1 tablet (4 mg) by mouth every 6 hours as needed for nausea 2/11/17   Felisha Coy MD   isoniazid (NYDRAZID) 300 MG tablet Take 1 tablet (300 mg) by mouth daily Take for 9 months total. You should have your liver function tests drawn monthly while on this medication. 2/11/17   Felisha Coy MD   valGANciclovir (VALCYTE) 450 MG tablet Take 1 tablet (450 mg) by mouth twice a week You should take this medication for 6 months total after your transplant. The dose of this medication may need to be changed as your kidney function improves (ask your Nephrologist). 2/13/17   Felisha Coy MD   amLODIPine (NORVASC) 10 MG tablet Take 1 tablet (10 mg) by mouth daily 2/11/17   Felisha Coy MD   senna-docusate (SENOKOT-S;PERICOLACE) 8.6-50 MG per tablet Take 1-2 tablets by mouth 2 times daily as needed for constipation 2/11/17   Felisha Coy MD   sulfamethoxazole-trimethoprim (BACTRIM/SEPTRA) 400-80 MG per tablet Take 1 tablet by mouth three times a week Take on Monday, Wednesday, and  Saturday. The dose of this medication may need to be changed as your kidney function improves (ask your Nephrologist). 2/11/17   Felisha Coy MD   pyridOXINE (VITAMIN B-6) 25 MG tablet Take 1 tablet (25 mg) by mouth daily 2/11/17   Felisha Coy MD   ciprofloxacin (CIPRO) 500 MG tablet Take 1 tablet (500 mg) by mouth every 24 hours 2/11/17   Felisha Coy MD   metoprolol (LOPRESSOR) 50 MG tablet Take 1 tablet (50 mg) by mouth 2 times daily 2/11/17   Felisha Coy MD   mycophenolate (CELLCEPT - GENERIC EQUIVALENT) 250 MG capsule Take 3 capsules (750 mg) by mouth 2 times daily 2/11/17   Felisha Coy MD   polyethylene glycol (MIRALAX) powder Take 17 g by mouth daily as needed.    Reported, Patient   calcium carbonate (TUMS) 500 MG chewable tablet Take 1 chew tab by mouth 3 times daily (with meals).    Reported, Patient       Vitals:  Reviewed   Exam:   GENERAL APPEARANCE: alert and no distress  HENT: mouth without ulcers or lesions  LYMPHATICS: no cervical or supraclavicular nodes  RESP: lungs clear to auscultation - no rales, rhonchi or wheezes  CV: regular rhythm, normal rate, no rub, no murmur  EDEMA: no LE edema bilaterally  ABDOMEN: soft, nondistended, nontender, bowel sounds normal  MS: extremities normal - no gross deformities noted, no evidence of inflammation in joints, no muscle tenderness  SKIN: no rash    Results:   Reviewed

## 2017-02-15 ENCOUNTER — TELEPHONE (OUTPATIENT)
Dept: TRANSPLANT | Facility: CLINIC | Age: 52
End: 2017-02-15

## 2017-02-15 NOTE — LETTER
February 16, 2017     Laboratory Orders    Patient Name:Angelica Romero   Transplant Date: 2/8/2017  YOB: 1965  Issue Date & Time: February 10, 2017 10:42 AM     [facility] MR: [id number]  Expiration Date:  (1 year after date issued)        Diagnoses: Aftercare following organ transplant (ICD-10  Z48.288)   Kidney Transplant (ICD-10  Z94.0)   Long term use of medications (ICD-10  Z79.899)     ?Lab results to be available on the same day drawn.   ?Patient should release information to the [facility], [facility].  ?Please fax to the Transplant Center at [phone].    3x/week, First 4Weeks post-transplant    2/8/2017 - 3/8/2017    2x/week, Months 2-3 post-transplant    3/9/2017 - 5/9/2017       1x/week, Months 4-6 post-transplant    5/10/2017 - 8/10/2017  Every 2 weeks, Months 7-9 post-transplant    8/11/2017 - 11/11/2017  Every 3 weeks, Months 10-12 post-transplant   11/12/2017 - 2/8/2018       ?Hemogram and Platelet   ?Basic Metabolic Panel (Sodium, Potassium,Chloride, CO2, Creatinine, Urea Nitrogen, Glucose, Calcium)   ?Prograf/Tacrolimus drug level     Weekly through first 3 months post-transplant    2/8/2017 - 5/8/2017   ?Phosphorus, Magnesium   ?MPA level (mycophenolic acid) once per week for first 3 months.    ?Please add a diff to hemogram once per week for the first 3 months.    At 6 & 12 months post-transplant         8/2017 & 2/2018   ? HBsurfaceAb, HBcoreAb, HCV at 6 months only -   ? Liver Function Tests(Bilirubin Direct/Total, AST, ALT, Alkaline Phosphatase)   ?Complete Lipid Panel fasting (Cholesterol, Triglycerides, HDL, LDL)   ? Random Urine for Protein/Creatinineratio    At month(s) 1, 2, 4, 6, 9, 12      3/2017, 4/2017, 6/2017, 8/2017, 11/2017, 2/2018                  ? PRA/DSA level (mailers provided by the patient)                  ? BK PCR Quantitative (Polyoma virus - blood in purple top tube to Reference Lab)      If you have any questions,  please call The Transplant Center at [phone] or [phone].    Please faxall results to [phone].        Jose Naranjo MD  Department of Surgery

## 2017-02-16 ENCOUNTER — TELEPHONE (OUTPATIENT)
Dept: TRANSPLANT | Facility: CLINIC | Age: 52
End: 2017-02-16

## 2017-02-16 NOTE — TELEPHONE ENCOUNTER
Called Home Care in [place] review lab orders  Left message with home care nurse Eitan [phone]  Faxed lab orders/discharge orders - Barbara faxed face to face

## 2017-02-16 NOTE — TELEPHONE ENCOUNTER
Please call Aniyah at [facility] regarding patient labs being sent to [facility] only  If you have questions. Thanks

## 2017-02-16 NOTE — TELEPHONE ENCOUNTER
Spoke to patient and reviewed her current medication list.  The only concern was Vitamin B-6 is on her list and patient dose not have that medication at home and states that she did not take prior to transplant.  Updated pharmacy, lab and PCP info in patient chart.

## 2017-02-16 NOTE — TELEPHONE ENCOUNTER
8 days post kidney transplant      Please call review medications list with Angelica Romero   Update pharmacy in [facility]   Update lab in [facility]   Update PCP in [facility]

## 2017-02-17 DIAGNOSIS — Z94.0 KIDNEY REPLACED BY TRANSPLANT: Primary | ICD-10-CM

## 2017-02-17 DIAGNOSIS — Z48.298 AFTERCARE FOLLOWING ORGAN TRANSPLANT: ICD-10-CM

## 2017-02-17 DIAGNOSIS — Z94.0 DECEASED-DONOR KIDNEY TRANSPLANT: ICD-10-CM

## 2017-02-17 DIAGNOSIS — Z22.7 LATENT TUBERCULOSIS BY BLOOD TEST: ICD-10-CM

## 2017-02-17 RX ORDER — TACROLIMUS 0.5 MG/1
0.5 CAPSULE ORAL 2 TIMES DAILY
Qty: 60 CAPSULE | Refills: 11 | Status: SHIPPED | OUTPATIENT
Start: 2017-02-17 | End: 2017-04-03

## 2017-02-17 RX ORDER — VALGANCICLOVIR 450 MG/1
450 TABLET, FILM COATED ORAL DAILY
Qty: 30 TABLET | Refills: 6 | Status: SHIPPED | OUTPATIENT
Start: 2017-02-17 | End: 2017-02-23

## 2017-02-17 RX ORDER — TACROLIMUS 1 MG/1
3 CAPSULE ORAL 2 TIMES DAILY
Qty: 180 CAPSULE | Refills: 11 | Status: SHIPPED | OUTPATIENT
Start: 2017-02-17 | End: 2017-04-03

## 2017-02-17 RX ORDER — MYCOPHENOLATE MOFETIL 250 MG/1
750 CAPSULE ORAL 2 TIMES DAILY
Qty: 180 CAPSULE | Refills: 11 | Status: SHIPPED | OUTPATIENT
Start: 2017-02-17 | End: 2018-02-20

## 2017-02-17 RX ORDER — SULFAMETHOXAZOLE AND TRIMETHOPRIM 400; 80 MG/1; MG/1
1 TABLET ORAL
Qty: 12 TABLET | Refills: 11 | Status: SHIPPED
Start: 2017-02-18 | End: 2018-02-20

## 2017-02-17 RX ORDER — ISONIAZID 300 MG/1
300 TABLET ORAL DAILY
Qty: 30 TABLET | Refills: 1 | Status: SHIPPED
Start: 2017-02-17 | End: 2017-10-19

## 2017-02-20 ENCOUNTER — TELEPHONE (OUTPATIENT)
Dept: TRANSPLANT | Facility: CLINIC | Age: 52
End: 2017-02-20

## 2017-02-20 DIAGNOSIS — Z94.0 KIDNEY TRANSPLANTED: Primary | ICD-10-CM

## 2017-02-20 NOTE — LETTER
PHYSICIAN ORDERS      DATE & TIME ISSUED: 2017 3:14 PM  PATIENT NAME: Angelica Romero   : 1965     [facility] MR# [if applicable]: [id number]     DIAGNOSIS:  Kidney Transplant   ICD-10 CODE: Z94.0     Please repeat the following labs:  UA/UC    Any questions please call: [phone]    Please fax these results to [phone].      Thony Montes

## 2017-02-21 DIAGNOSIS — Z94.0 KIDNEY TRANSPLANTED: Primary | ICD-10-CM

## 2017-02-21 NOTE — LETTER
OUTPATIENT LABORATORY TEST ORDER    Patient Name:Angelica Romero   Transplant Date: 2/8/2017  YOB: 1965  Issue Date & Time: February 10, 2017 10:42 AM     [facility] MR: [id number]  Expiration Date:  (1 year after date issued)        Diagnoses: Aftercare following organ transplant (ICD-10  Z48.288)   Kidney Transplant (ICD-10  Z94.0)   Long term use of medications (ICD-10  Z79.899)     ?Lab results to be available on the same day drawn.   ?Patient should release information to the [facility], [facility].  ?Please fax to the Transplant Center at [phone].    3x/week, First 4Weeks post-transplant    2/8/2017 - 3/8/2017    2x/week, Months 2-3 post-transplant    3/9/2017 - 5/9/2017       1x/week, Months 4-6 post-transplant    5/10/2017 - 8/10/2017  Every 2 weeks, Months 7-9 post-transplant    8/11/2017 - 11/11/2017  Every 3 weeks, Months 10-12 post-transplant   11/12/2017 - 2/8/2018       ?Hemogram and Platelet   ?Basic Metabolic Panel (Sodium, Potassium,Chloride, CO2, Creatinine, Urea Nitrogen, Glucose, Calcium)   ?Prograf/Tacrolimus drug level     Weekly through first 3 months post-transplant    2/8/2017 - 5/8/2017   ?Phosphorus, Magnesium   ?MPA level (mycophenolic acid) once per week for first 3 months.    ?Please add a diff to hemogram once per week for the first 3 months.    At 6 & 12 months post-transplant         8/2017 & 2/2018   ? HBsurfaceAb, HBcoreAb, HCV at 6 months only -   ? Liver Function Tests(Bilirubin Direct/Total, AST, ALT, Alkaline Phosphatase)   ?Complete Lipid Panel fasting (Cholesterol, Triglycerides, HDL, LDL)   ? Random Urine for Protein/Creatinineratio    At month(s) 1, 2, 4, 6, 9, 12      3/2017, 4/2017, 6/2017, 8/2017, 11/2017, 2/2018                  ? PRA/DSA level (mailers provided by the patient)                  ? BK PCR Quantitative (Polyoma virus - blood in purple top tube to Reference Lab)      If you have any questions, please  call The Transplant Center at [phone] or [phone].    Please faxall results to [phone].        Jose Naranjo MD  Department of Surgery

## 2017-02-22 ENCOUNTER — DOCUMENTATION ONLY (OUTPATIENT)
Dept: TRANSPLANT | Facility: CLINIC | Age: 52
End: 2017-02-22

## 2017-02-22 NOTE — PROGRESS NOTES
FINAL positive donor SPUTUM culture results have been uploaded to [employer].  Donor ID [id number].  Dr. MEMBRENO notified of results.

## 2017-02-23 DIAGNOSIS — Z94.0 DECEASED-DONOR KIDNEY TRANSPLANT: ICD-10-CM

## 2017-02-23 DIAGNOSIS — Z48.298 AFTERCARE FOLLOWING ORGAN TRANSPLANT: ICD-10-CM

## 2017-02-23 DIAGNOSIS — Z94.0 KIDNEY REPLACED BY TRANSPLANT: Primary | ICD-10-CM

## 2017-02-23 DIAGNOSIS — Z22.7 LATENT TUBERCULOSIS BY BLOOD TEST: ICD-10-CM

## 2017-02-23 RX ORDER — VALGANCICLOVIR 450 MG/1
450 TABLET, FILM COATED ORAL DAILY
Qty: 30 TABLET | Refills: 5 | Status: SHIPPED | OUTPATIENT
Start: 2017-02-23 | End: 2017-04-03

## 2017-02-23 NOTE — TELEPHONE ENCOUNTER
----- Message from Emely Gould RPH sent at 2/23/2017  1:04 PM CST -----  Regarding: Valganciclovir  Hi Nereida,    Just want to touch base with you on Angelica's valganciclovir. The prescription was written for a total of 7 months. The directions indicate the she should take for 6 months.  Are you ok if we take off one refill? This is also a good double check for the pharmacists that when the refills run out then the patient's treatment should be completed.      Thanks,  Emely Gould, Pharm D.    Specialty Pharmacy  [address] [place] [address]  Phone: [phone]

## 2017-02-27 NOTE — LETTER
PHYSICIAN ORDERS      DATE & TIME ISSUED: 2017  9:00 AM  PATIENT NAME: Angelica Romero   : 1965     [facility] MR# [if applicable]: [id number]     DIAGNOSIS:  Kidney Transplant   ICD-10 CODE: Z94.0     Please complete the following labs in addition to routine transplant labs on 3/1/2017:  UA/UC  LFT    Any questions please call: [phone]    Please fax these results to [phone].      Thony Montes

## 2017-03-06 ENCOUNTER — EXTERNAL ORDER RESULTS (OUTPATIENT)
Dept: TRANSPLANT | Facility: CLINIC | Age: 52
End: 2017-03-06

## 2017-03-06 DIAGNOSIS — Z48.298 AFTERCARE FOLLOWING ORGAN TRANSPLANT: ICD-10-CM

## 2017-03-06 DIAGNOSIS — Z94.0 KIDNEY REPLACED BY TRANSPLANT: ICD-10-CM

## 2017-03-06 PROCEDURE — 80197 ASSAY OF TACROLIMUS: CPT | Performed by: INTERNAL MEDICINE

## 2017-03-07 ENCOUNTER — OFFICE VISIT (OUTPATIENT)
Dept: TRANSPLANT | Facility: CLINIC | Age: 52
End: 2017-03-07
Attending: SURGERY
Payer: MEDICARE

## 2017-03-07 VITALS
SYSTOLIC BLOOD PRESSURE: 134 MMHG | OXYGEN SATURATION: 98 % | RESPIRATION RATE: 16 BRPM | HEART RATE: 74 BPM | DIASTOLIC BLOOD PRESSURE: 82 MMHG | TEMPERATURE: 97.6 F

## 2017-03-07 DIAGNOSIS — Z94.0 KIDNEY TRANSPLANTED: ICD-10-CM

## 2017-03-07 DIAGNOSIS — E83.42 HYPOMAGNESEMIA: ICD-10-CM

## 2017-03-07 DIAGNOSIS — E83.39 HYPOPHOSPHATEMIA: ICD-10-CM

## 2017-03-07 DIAGNOSIS — Z79.899 IMMUNOSUPPRESSIVE MANAGEMENT ENCOUNTER FOLLOWING KIDNEY TRANSPLANT: Primary | ICD-10-CM

## 2017-03-07 DIAGNOSIS — Z94.0 IMMUNOSUPPRESSIVE MANAGEMENT ENCOUNTER FOLLOWING KIDNEY TRANSPLANT: Primary | ICD-10-CM

## 2017-03-07 RX ORDER — MAGNESIUM OXIDE 400 MG/1
800 TABLET ORAL 2 TIMES DAILY
Qty: 120 TABLET | Refills: 3 | Status: SHIPPED
Start: 2017-03-07 | End: 2024-01-03

## 2017-03-07 NOTE — MR AVS SNAPSHOT
After Visit Summary   3/7/2017    Angelica Romero    MRN: [id number]           Patient Information     Date Of Birth          1965        Visit Information        Provider Department      3/7/2017 9:40 AM Jose Naranjo MD [facility] Solid Organ Transplant        Today's Diagnoses     Immunosuppressive management encounter following kidney transplant    -  1    Kidney transplanted        Hypomagnesemia        Hypophosphatemia           Follow-ups after your visit        Follow-up notes from your care team     Return in about 3 months (around 6/7/2017).      Your next 10 appointments already scheduled     Mar 30, 2017  1:50 PM CDT   (Arrive by 1:20 PM)   Return Kidney Transplant with Bill Tejeda MD   [facility] ([facility])    [address] [place] [id number] [phone]            Jun 08, 2017  1:50 PM CDT   (Arrive by 1:20 PM)   Return Kidney Transplant with Bill Tejeda MD   [facility] ([facility])    [address] [place] [id number] [phone]              Who to contact     If you have questions or need follow up information about today's clinic visit or your schedule please contact [facility] SOLID ORGAN TRANSPLANT directly at [phone].  Normal or non-critical lab and imaging results will be communicated to you by MyChart, letter or phone within 4 business days after the clinic has received the results. If you do not hear from us within 7 days, please contact the clinic through [email or web address] or phone. If you have a critical or abnormal lab result, we will notify you by phone as soon as possible.  Submit refill requests through Lightbox or call your pharmacy and they will forward the refill request to us. Please allow 3 business days for your refill to be completed.          Additional Information About Your Visit        MyChart Information     Lightbox gives  you secure access to your electronic health record. If you see a primary care provider, you can also send messages to your care team and make appointments. If you have questions, please call your primary care clinic.  If you do not have a primary care provider, please call [phone] and they will assist you.        Care EveryWhere ID     This is your Care EveryWhere ID. This could be used by other organizations to access your [facility] medical records  [id number]        Your Vitals Were     Pulse Temperature Respirations Pulse Oximetry          74 97.6  F (36.4  C) (Oral) 16 98%         Blood Pressure from Last 3 Encounters:   03/07/17 134/82   02/14/17 149/70   02/13/17 171/81    Weight from Last 3 Encounters:   02/14/17 88.8 kg (195 lb 12.8 oz)   02/12/17 92.1 kg (203 lb)   02/11/17 91.9 kg (202 lb 9.6 oz)              Today, you had the following     No orders found for display         Today's Medication Changes          These changes are accurate as of: 3/7/17  1:00 PM.  If you have any questions, ask your nurse or doctor.               Start taking these medicines.        Dose/Directions    magnesium oxide 400 MG tablet   Commonly known as:  MAG-OX   Used for:  Hypomagnesemia        Dose:  800 mg   Take 2 tablets (800 mg) by mouth 2 times daily   Quantity:  120 tablet   Refills:  3       phosphorus tablet 250 mg 250 MG per tablet   Commonly known as:  K PHOS NEUTRAL   Used for:  Hypophosphatemia        Dose:  250 mg   Take 1 tablet (250 mg) by mouth 3 times daily   Quantity:  90 tablet   Refills:  3            Where to get your medicines      These medications were sent to [facility] [place], [place] - [address] [facility] [address]  [address] [place] [address] 1[facility] [address]    Hours:  TRANSPLANT PHONE NUMBER [phone] Phone:  [phone]     magnesium oxide 400 MG tablet    phosphorus tablet 250 mg 250 MG per tablet                Primary Care Provider Office Phone # Fax #     Harry Laboy [phone]       [facility] [address] [place] [address]        Thank you!     Thank you for choosing [facility] ORGAN TRANSPLANT  for your care. Our goal is always to provide you with excellent care. Hearing back from our patients is one way we can continue to improve our services. Please take a few minutes to complete the written survey that you may receive in the mail after your visit with us. Thank you!             Your Updated Medication List - Protect others around you: Learn how to safely use, store and throw away your medicines at www.disposemymeds.org.          This list is accurate as of: 3/7/17  1:00 PM.  Always use your most recent med list.                   Brand Name Dispense Instructions for use    amLODIPine 10 MG tablet    NORVASC    30 tablet    Take 1 tablet (10 mg) by mouth daily       ciprofloxacin 500 MG tablet    CIPRO    5 tablet    Take 1 tablet (500 mg) by mouth every 24 hours       isoniazid 300 MG tablet    NYDRAZID    30 tablet    Take 1 tablet (300 mg) by mouth daily Take for 9 months total. You should have your liver function tests drawn monthly while on this medication.       magnesium oxide 400 MG tablet    MAG-OX    120 tablet    Take 2 tablets (800 mg) by mouth 2 times daily       metoprolol 50 MG tablet    LOPRESSOR    60 tablet    Take 1 tablet (50 mg) by mouth 2 times daily       MIRALAX powder   Generic drug:  polyethylene glycol      Take 17 g by mouth daily as needed.       mycophenolate 250 MG capsule    CELLCEPT - GENERIC EQUIVALENT    180 capsule    Take 3 capsules (750 mg) by mouth 2 times daily       ondansetron 4 MG ODT tab    ZOFRAN-ODT    30 tablet    Take 1 tablet (4 mg) by mouth every 6 hours as needed for nausea       oxyCODONE-acetaminophen 5-325 MG per tablet    PERCOCET    30 tablet    Take 1-2 tablets by mouth every 4 hours as needed for moderate to severe pain This medication contains Tylenol  (acetaminophen). Do not take more than 4,000 mg of Tylenol (acetaminophen) from all sources to prevent liver damage.       phosphorus tablet 250 mg 250 MG per tablet    K PHOS NEUTRAL    90 tablet    Take 1 tablet (250 mg) by mouth 3 times daily       pyridOXINE 25 MG tablet    vitamin B-6    30 tablet    Take 1 tablet (25 mg) by mouth daily       senna-docusate 8.6-50 MG per tablet    SENOKOT-S;PERICOLACE    100 tablet    Take 1-2 tablets by mouth 2 times daily as needed for constipation       sulfamethoxazole-trimethoprim 400-80 MG per tablet    BACTRIM/SEPTRA    12 tablet    Take 1 tablet by mouth three times a week Take on Monday, Wednesday, and Saturday. The dose of this medication may need to be changed as your kidney function improves (ask your Nephrologist).       * tacrolimus 0.5 MG capsule    PROGRAF - GENERIC EQUIVALENT    60 capsule    Take 1 capsule (0.5 mg) by mouth 2 times daily Total dose = 3.5 mg BID       * tacrolimus 1 MG capsule    PROGRAF - GENERIC EQUIVALENT    180 capsule    Take 3 capsules (3 mg) by mouth 2 times daily Total dose = 3.5 mg BID       TUMS 500 MG chewable tablet   Generic drug:  calcium carbonate      Take 1 chew tab by mouth 3 times daily (with meals).       valGANciclovir 450 MG tablet    VALCYTE    30 tablet    Take 1 tablet (450 mg) by mouth daily You should take this medication for 6 months total after your transplant.       * Notice:  This list has 2 medication(s) that are the same as other medications prescribed for you. Read the directions carefully, and ask your doctor or other care provider to review them with you.

## 2017-03-07 NOTE — NURSING NOTE
"Chief Complaint   Patient presents with     Kidney Transplant     POD 27       Initial /82 (BP Location: Right arm, Patient Position: Chair, Cuff Size: Adult Regular)  Pulse 74  Temp 97.6  F (36.4  C) (Oral)  Resp 16  SpO2 98% Estimated body mass index is 35.81 kg/(m^2) as calculated from the following:    Height as of 2/8/17: 1.575 m (5' 2\").    Weight as of 2/14/17: 88.8 kg (195 lb 12.8 oz).  Medication Reconciliation: complete    "

## 2017-03-07 NOTE — PROGRESS NOTES
Transplant Surgery Progress Note    Transplants:  2017 (Kidney); Postoperative day:  27  S: Angelica is here today for her first postop followup s/p  donor kidney transplant.  She has no fever, diarrhea, nausea resolving.  No wound concerns.  Due for staple removal today.  Just this week she has noted total insomnia.  No headaches or vivid dreams.    Transplant History:  Transplant: 2017 (Kidney)   Donor Type:  - Brain Death       Crossmatch:  negative   Baseline creatinine:  Not at nabila yet.  DSA: No  Biopsy:  No  Rejection History:  No  Significant Complications: None  Transplant Coordinator: Nereida Burnham     Transplant Office Phone Number: [phone]      Immunsupresent Medications     Immunosuppressive Agents Disp Start End    mycophenolate (CELLCEPT - GENERIC EQUIVALENT) 250 MG capsule 180 capsule 2017     Sig - Route: Take 3 capsules (750 mg) by mouth 2 times daily - Oral    Class: E-Prescribe    tacrolimus (PROGRAF - GENERIC EQUIVALENT) 0.5 MG capsule 60 capsule 2017     Sig - Route: Take 1 capsule (0.5 mg) by mouth 2 times daily Total dose = 3.5 mg BID - Oral    Class: E-Prescribe    tacrolimus (PROGRAF - GENERIC EQUIVALENT) 1 MG capsule 180 capsule 2017     Sig - Route: Take 3 capsules (3 mg) by mouth 2 times daily Total dose = 3.5 mg BID - Oral    Class: E-Prescribe        Possible Immunosuppression-related side effects:   []             headache  []             vivid dreams  []             irritability  []             fine tremor  []             nausea  []             diarrhea  []             neuropathy      []             edema  []             renal calcineurin toxicity  []             hyperkalemia  []             post-transplant diabetes  []             decreased appetite  []             increased appetite  [x]             Other:  heartburn  []             none    Prescription Medications as of 3/7/2017             valGANciclovir (VALCYTE) 450 MG tablet Take 1  tablet (450 mg) by mouth daily You should take this medication for 6 months total after your transplant.    isoniazid (NYDRAZID) 300 MG tablet Take 1 tablet (300 mg) by mouth daily Take for 9 months total. You should have your liver function tests drawn monthly while on this medication.    mycophenolate (CELLCEPT - GENERIC EQUIVALENT) 250 MG capsule Take 3 capsules (750 mg) by mouth 2 times daily    sulfamethoxazole-trimethoprim (BACTRIM/SEPTRA) 400-80 MG per tablet Take 1 tablet by mouth three times a week Take on Monday, Wednesday, and Saturday. The dose of this medication may need to be changed as your kidney function improves (ask your Nephrologist).    tacrolimus (PROGRAF - GENERIC EQUIVALENT) 0.5 MG capsule Take 1 capsule (0.5 mg) by mouth 2 times daily Total dose = 3.5 mg BID    tacrolimus (PROGRAF - GENERIC EQUIVALENT) 1 MG capsule Take 3 capsules (3 mg) by mouth 2 times daily Total dose = 3.5 mg BID    oxyCODONE-acetaminophen (PERCOCET) 5-325 MG per tablet Take 1-2 tablets by mouth every 4 hours as needed for moderate to severe pain This medication contains Tylenol (acetaminophen). Do not take more than 4,000 mg of Tylenol (acetaminophen) from all sources to prevent liver damage.    ondansetron (ZOFRAN-ODT) 4 MG ODT tab Take 1 tablet (4 mg) by mouth every 6 hours as needed for nausea    amLODIPine (NORVASC) 10 MG tablet Take 1 tablet (10 mg) by mouth daily    senna-docusate (SENOKOT-S;PERICOLACE) 8.6-50 MG per tablet Take 1-2 tablets by mouth 2 times daily as needed for constipation    pyridOXINE (VITAMIN B-6) 25 MG tablet Take 1 tablet (25 mg) by mouth daily    ciprofloxacin (CIPRO) 500 MG tablet Take 1 tablet (500 mg) by mouth every 24 hours    metoprolol (LOPRESSOR) 50 MG tablet Take 1 tablet (50 mg) by mouth 2 times daily    polyethylene glycol (MIRALAX) powder Take 17 g by mouth daily as needed.    calcium carbonate (TUMS) 500 MG chewable tablet Take 1 chew tab by mouth 3 times daily (with meals).           O:   Temp:  [97.6  F (36.4  C)] 97.6  F (36.4  C)  Pulse:  [74] 74  Resp:  [16] 16  BP: (134)/(82) 134/82  SpO2:  [98 %] 98 %  General Appearance: in no apparent distress.   Skin: Normal, no rashes or jaundice  Heart: regular rate and rhythm  Lungs: easy respirations, no audible wheezing.  Abdomen: obese, The wound is dry and intact, without hernia. The abdomen is non-tender. The kidney graft is not tender.  There is no ascites.  Extremities: Tremor absent.   Edema: absent.      Transplant Immunosuppression Labs Latest Ref Rng & Units 2/14/2017 2/13/2017 2/12/2017 2/11/2017 2/10/2017   Tacro Level 5.0 - 15.0 ug/L 7.1 6.5 4.8(L) <3.0  Tacrolimus Reference Range   Kidney Transplant   Pediatric                      ug/L     0-3 months post transplant   10-12     3-6 months post transplant   8-10     6-12 months post transplant  6-8     >12 months post transplant   4-7   Adult     0-6 months post transplant   8-10     6-12 months post transplant  6-8     >12 months post transplant   4-6     >5 years post transplant     3-5   Heart Transplant   Pediatric     0-12 months post transplant  10-15     >12 months post transplant   5-10   Adult     0-3 months post transplant   10-15     3-6 months post transplant   8-12     6-12 months post transplant  6-12     >12 months post transplant   6-10   Lung Transplant     0-12 months post transplant  10-15     >12 months post transplant   8-12   Liver Transplant   Pediatric     0-3 months post transplant   10-15     3-6 months post transplant   8-10     >6 months post transplant    6-8   Adult     0-3 months post transplant   10-12     3-6 months post transplant   8-10     >6   months post transplant    6-8   Pancreas Transplant     0-6 months post transplant   8-10     >6 months post transplant    5-8   This test was developed and its performance characteristics determined by the   [facility],  Special Chemistry Laboratory. It has   not been cleared  or approved by the FDA. The laboratory is regulated under CLIA   as qualified to perform high-complexity testing. This test is used for clinical   purposes. It should not be regarded as investigational or for research.  (L) 3.5(L)   Tacro Level - Not Provided Not Provided Not Provided Not Provided Not Provided   Creat 0.52 - 1.04 mg/dL 5.92(H) 6.82(H) 7.53(H) 7.56(H) 6.83(H)   BUN 7 - 30 mg/dL 78(H) 80(H) 80(H) 61(H) 47(H)   WBC 4.0 - 11.0 10e9/L 6.7 6.7 5.0 3.0(L) 7.5   Neutrophil % 92.4 90.9 - 87.8 94.7   ANEU 1.6 - 8.3 10e9/L 6.2 6.1 - 2.7 7.1       Chemistries:   Recent Labs   Lab Test  02/14/17   0655   BUN  78*   CR  5.92*   GFRESTIMATED  7*   GLC  160*     Lab Results   Component Value Date    A1C 4.0 02/08/2017     Recent Labs   Lab Test  02/11/17   0610   ALBUMIN  2.8*   BILITOTAL  0.6   ALKPHOS  39*   AST  14   ALT  27     Urine Studies:  Recent Labs   Lab Test  04/11/12   1151   COLOR  Yellow   APPEARANCE  Slightly Cloudy   URINEGLC  Negative   URINEBILI  Negative   URINEKETONE  Negative   SG  1.012   UBLD  Negative   URINEPH  8.0*   PROTEIN  30*   NITRITE  Negative   LEUKEST  Large*   RBCU  1   WBCU  6*     Recent Labs   Lab Test  02/14/17   0740  02/10/17   1250   UTPG  0.81*  2.71*     Hematology:   Recent Labs   Lab Test  02/14/17   0655  02/13/17   0718  02/12/17   0715   HGB  10.0*  10.2*  9.8*   PLT  159  137*  127*   WBC  6.7  6.7  5.0     Coags:   Recent Labs   Lab Test  02/08/17   1333  09/10/16   0428   INR  1.13  1.08     HLA antibodies:   SA1 Hi Risk Denise   Date Value Ref Range Status   02/07/2017 No specificity defined  Final     SA1 Mod Risk Denise   Date Value Ref Range Status   02/07/2017 B:44 82 Cw:16  Final     SA2 Hi Risk Denise   Date Value Ref Range Status   02/07/2017 None  Final     SA2 Mod Risk Denise   Date Value Ref Range Status   02/07/2017 DR:8  Final       Assessment:     Plan:    1. Graft function: not yet at nabila - doing well.  2. Immunosuppression Management: No change   Complexity  of management:Medium. Contributing factors: heartburn  3. Heartburn: will continue taking cellcept with meals and avoid eating spicy or too late.  No issues with maintaining hydration, so will check MMF level and if supratherapeutic will decrease dose, correlate symptoms and recheck MMF level.  If symptoms persist or MMF levels low, will change to myfortic.  4. Hypomagnesemia and hypophosphatemia: rx given for both replacement.   5.Stent: patient does NOT have a stent.    6. Prophylaxis: CMVV D+/R-.  Valcyte x 6 mo.  Counselled re CMV and symptoms.  7. ?Thrush: pt education given, will recheck at home and if present, will call coordinator.  8. Followup: annually    Total Time: 25 min,   Counselling Time: 20 min.            Jose Naranjo MD  Department of Surgery

## 2017-03-07 NOTE — LETTER
3/7/2017      RE: Angelica Romero  [address]  [facility] [address]       Transplant Surgery Progress Note    Transplants:  2017 (Kidney); Postoperative day:  27  S: Angelica is here today for her first postop followup s/p  donor kidney transplant.  She has no fever, diarrhea, nausea resolving.  No wound concerns.  Due for staple removal today.  Just this week she has noted total insomnia.  No headaches or vivid dreams.    Transplant History:  Transplant: 2017 (Kidney)   Donor Type:  - Brain Death       Crossmatch:  negative   Baseline creatinine:  Not at nabila yet.  DSA: No  Biopsy:  No  Rejection History:  No  Significant Complications: None  Transplant Coordinator: Nereida Burnham     Transplant Office Phone Number: [phone]      Immunsupresent Medications     Immunosuppressive Agents Disp Start End    mycophenolate (CELLCEPT - GENERIC EQUIVALENT) 250 MG capsule 180 capsule 2017     Sig - Route: Take 3 capsules (750 mg) by mouth 2 times daily - Oral    Class: E-Prescribe    tacrolimus (PROGRAF - GENERIC EQUIVALENT) 0.5 MG capsule 60 capsule 2017     Sig - Route: Take 1 capsule (0.5 mg) by mouth 2 times daily Total dose = 3.5 mg BID - Oral    Class: E-Prescribe    tacrolimus (PROGRAF - GENERIC EQUIVALENT) 1 MG capsule 180 capsule 2017     Sig - Route: Take 3 capsules (3 mg) by mouth 2 times daily Total dose = 3.5 mg BID - Oral    Class: E-Prescribe        Possible Immunosuppression-related side effects:   []             headache  []             vivid dreams  []             irritability  []             fine tremor  []             nausea  []             diarrhea  []             neuropathy      []             edema  []             renal calcineurin toxicity  []             hyperkalemia  []             post-transplant diabetes  []             decreased appetite  []             increased appetite  [x]             Other:  heartburn  []             none    Prescription Medications as  of 3/7/2017             valGANciclovir (VALCYTE) 450 MG tablet Take 1 tablet (450 mg) by mouth daily You should take this medication for 6 months total after your transplant.    isoniazid (NYDRAZID) 300 MG tablet Take 1 tablet (300 mg) by mouth daily Take for 9 months total. You should have your liver function tests drawn monthly while on this medication.    mycophenolate (CELLCEPT - GENERIC EQUIVALENT) 250 MG capsule Take 3 capsules (750 mg) by mouth 2 times daily    sulfamethoxazole-trimethoprim (BACTRIM/SEPTRA) 400-80 MG per tablet Take 1 tablet by mouth three times a week Take on Monday, Wednesday, and Saturday. The dose of this medication may need to be changed as your kidney function improves (ask your Nephrologist).    tacrolimus (PROGRAF - GENERIC EQUIVALENT) 0.5 MG capsule Take 1 capsule (0.5 mg) by mouth 2 times daily Total dose = 3.5 mg BID    tacrolimus (PROGRAF - GENERIC EQUIVALENT) 1 MG capsule Take 3 capsules (3 mg) by mouth 2 times daily Total dose = 3.5 mg BID    oxyCODONE-acetaminophen (PERCOCET) 5-325 MG per tablet Take 1-2 tablets by mouth every 4 hours as needed for moderate to severe pain This medication contains Tylenol (acetaminophen). Do not take more than 4,000 mg of Tylenol (acetaminophen) from all sources to prevent liver damage.    ondansetron (ZOFRAN-ODT) 4 MG ODT tab Take 1 tablet (4 mg) by mouth every 6 hours as needed for nausea    amLODIPine (NORVASC) 10 MG tablet Take 1 tablet (10 mg) by mouth daily    senna-docusate (SENOKOT-S;PERICOLACE) 8.6-50 MG per tablet Take 1-2 tablets by mouth 2 times daily as needed for constipation    pyridOXINE (VITAMIN B-6) 25 MG tablet Take 1 tablet (25 mg) by mouth daily    ciprofloxacin (CIPRO) 500 MG tablet Take 1 tablet (500 mg) by mouth every 24 hours    metoprolol (LOPRESSOR) 50 MG tablet Take 1 tablet (50 mg) by mouth 2 times daily    polyethylene glycol (MIRALAX) powder Take 17 g by mouth daily as needed.    calcium carbonate (TUMS) 500 MG  chewable tablet Take 1 chew tab by mouth 3 times daily (with meals).          O:   Temp:  [97.6  F (36.4  C)] 97.6  F (36.4  C)  Pulse:  [74] 74  Resp:  [16] 16  BP: (134)/(82) 134/82  SpO2:  [98 %] 98 %  General Appearance: in no apparent distress.   Skin: Normal, no rashes or jaundice  Heart: regular rate and rhythm  Lungs: easy respirations, no audible wheezing.  Abdomen: obese, The wound is dry and intact, without hernia. The abdomen is non-tender. The kidney graft is not tender.  There is no ascites.  Extremities: Tremor absent.   Edema: absent.      Transplant Immunosuppression Labs Latest Ref Rng & Units 2/14/2017 2/13/2017 2/12/2017 2/11/2017 2/10/2017   Tacro Level 5.0 - 15.0 ug/L 7.1 6.5 4.8(L) <3.0  Tacrolimus Reference Range   Kidney Transplant   Pediatric                      ug/L     0-3 months post transplant   10-12     3-6 months post transplant   8-10     6-12 months post transplant  6-8     >12 months post transplant   4-7   Adult     0-6 months post transplant   8-10     6-12 months post transplant  6-8     >12 months post transplant   4-6     >5 years post transplant     3-5   Heart Transplant   Pediatric     0-12 months post transplant  10-15     >12 months post transplant   5-10   Adult     0-3 months post transplant   10-15     3-6 months post transplant   8-12     6-12 months post transplant  6-12     >12 months post transplant   6-10   Lung Transplant     0-12 months post transplant  10-15     >12 months post transplant   8-12   Liver Transplant   Pediatric     0-3 months post transplant   10-15     3-6 months post transplant   8-10     >6 months post transplant    6-8   Adult     0-3 months post transplant   10-12     3-6 months post transplant   8-10     >6   months post transplant    6-8   Pancreas Transplant     0-6 months post transplant   8-10     >6 months post transplant    5-8   This test was developed and its performance characteristics determined by the   [facility],  Special Chemistry Laboratory. It has   not been cleared or approved by the FDA. The laboratory is regulated under CLIA   as qualified to perform high-complexity testing. This test is used for clinical   purposes. It should not be regarded as investigational or for research.  (L) 3.5(L)   Tacro Level - Not Provided Not Provided Not Provided Not Provided Not Provided   Creat 0.52 - 1.04 mg/dL 5.92(H) 6.82(H) 7.53(H) 7.56(H) 6.83(H)   BUN 7 - 30 mg/dL 78(H) 80(H) 80(H) 61(H) 47(H)   WBC 4.0 - 11.0 10e9/L 6.7 6.7 5.0 3.0(L) 7.5   Neutrophil % 92.4 90.9 - 87.8 94.7   ANEU 1.6 - 8.3 10e9/L 6.2 6.1 - 2.7 7.1       Chemistries:   Recent Labs   Lab Test  02/14/17   0655   BUN  78*   CR  5.92*   GFRESTIMATED  7*   GLC  160*     Lab Results   Component Value Date    A1C 4.0 02/08/2017     Recent Labs   Lab Test  02/11/17   0610   ALBUMIN  2.8*   BILITOTAL  0.6   ALKPHOS  39*   AST  14   ALT  27     Urine Studies:  Recent Labs   Lab Test  04/11/12   1151   COLOR  Yellow   APPEARANCE  Slightly Cloudy   URINEGLC  Negative   URINEBILI  Negative   URINEKETONE  Negative   SG  1.012   UBLD  Negative   URINEPH  8.0*   PROTEIN  30*   NITRITE  Negative   LEUKEST  Large*   RBCU  1   WBCU  6*     Recent Labs   Lab Test  02/14/17   0740  02/10/17   1250   UTPG  0.81*  2.71*     Hematology:   Recent Labs   Lab Test  02/14/17   0655  02/13/17   0718  02/12/17   0715   HGB  10.0*  10.2*  9.8*   PLT  159  137*  127*   WBC  6.7  6.7  5.0     Coags:   Recent Labs   Lab Test  02/08/17   1333  09/10/16   0428   INR  1.13  1.08     HLA antibodies:   SA1 Hi Risk Denise   Date Value Ref Range Status   02/07/2017 No specificity defined  Final     SA1 Mod Risk Denise   Date Value Ref Range Status   02/07/2017 B:44 82 Cw:16  Final     SA2 Hi Risk Denise   Date Value Ref Range Status   02/07/2017 None  Final     SA2 Mod Risk Denise   Date Value Ref Range Status   02/07/2017 DR:8  Final       Assessment:     Plan:    1. Graft function: not yet at  nabila - doing well.  2. Immunosuppression Management: No change   Complexity of management:Medium. Contributing factors: heartburn  3. Heartburn: will continue taking cellcept with meals and avoid eating spicy or too late.  No issues with maintaining hydration, so will check MMF level and if supratherapeutic will decrease dose, correlate symptoms and recheck MMF level.  If symptoms persist or MMF levels low, will change to myfortic.  4. Hypomagnesemia and hypophosphatemia: rx given for both replacement.   5.Stent: patient does NOT have a stent.    6. Prophylaxis: CMVV D+/R-.  Valcyte x 6 mo.  Counselled re CMV and symptoms.  7. ?Thrush: pt education given, will recheck at home and if present, will call coordinator.  8. Followup: annually    Total Time: 25 min,   Counselling Time: 20 min.            Jose Naranjo MD  Department of Surgery

## 2017-03-07 NOTE — LETTER
3/7/2017       RE: Angelica Romero  [address]  [facility] [address]     Dear Colleague,    Thank you for referring your patient, Angelica Romero, to the [facility] SOLID ORGAN TRANSPLANT at [facility]. Please see a copy of my visit note below.    Transplant Surgery Progress Note    Transplants:  2017 (Kidney); Postoperative day:  27  S: Angelica is here today for her first postop followup s/p  donor kidney transplant.  She has no fever, diarrhea, nausea resolving.  No wound concerns.  Due for staple removal today.  Just this week she has noted total insomnia.  No headaches or vivid dreams.    Transplant History:  Transplant: 2017 (Kidney)   Donor Type:  - Brain Death       Crossmatch:  negative   Baseline creatinine:  Not at nabila yet.  DSA: No  Biopsy:  No  Rejection History:  No  Significant Complications: None  Transplant Coordinator: Nereida Burnham     Transplant Office Phone Number: [phone]      Immunsupresent Medications     Immunosuppressive Agents Disp Start End    mycophenolate (CELLCEPT - GENERIC EQUIVALENT) 250 MG capsule 180 capsule 2017     Sig - Route: Take 3 capsules (750 mg) by mouth 2 times daily - Oral    Class: E-Prescribe    tacrolimus (PROGRAF - GENERIC EQUIVALENT) 0.5 MG capsule 60 capsule 2017     Sig - Route: Take 1 capsule (0.5 mg) by mouth 2 times daily Total dose = 3.5 mg BID - Oral    Class: E-Prescribe    tacrolimus (PROGRAF - GENERIC EQUIVALENT) 1 MG capsule 180 capsule 2017     Sig - Route: Take 3 capsules (3 mg) by mouth 2 times daily Total dose = 3.5 mg BID - Oral    Class: E-Prescribe        Possible Immunosuppression-related side effects:   []             headache  []             vivid dreams  []             irritability  []             fine tremor  []             nausea  []             diarrhea  []             neuropathy      []             edema  []             renal calcineurin toxicity  []              hyperkalemia  []             post-transplant diabetes  []             decreased appetite  []             increased appetite  []             other:  []             none    Prescription Medications as of 3/7/2017             valGANciclovir (VALCYTE) 450 MG tablet Take 1 tablet (450 mg) by mouth daily You should take this medication for 6 months total after your transplant.    isoniazid (NYDRAZID) 300 MG tablet Take 1 tablet (300 mg) by mouth daily Take for 9 months total. You should have your liver function tests drawn monthly while on this medication.    mycophenolate (CELLCEPT - GENERIC EQUIVALENT) 250 MG capsule Take 3 capsules (750 mg) by mouth 2 times daily    sulfamethoxazole-trimethoprim (BACTRIM/SEPTRA) 400-80 MG per tablet Take 1 tablet by mouth three times a week Take on Monday, Wednesday, and Saturday. The dose of this medication may need to be changed as your kidney function improves (ask your Nephrologist).    tacrolimus (PROGRAF - GENERIC EQUIVALENT) 0.5 MG capsule Take 1 capsule (0.5 mg) by mouth 2 times daily Total dose = 3.5 mg BID    tacrolimus (PROGRAF - GENERIC EQUIVALENT) 1 MG capsule Take 3 capsules (3 mg) by mouth 2 times daily Total dose = 3.5 mg BID    oxyCODONE-acetaminophen (PERCOCET) 5-325 MG per tablet Take 1-2 tablets by mouth every 4 hours as needed for moderate to severe pain This medication contains Tylenol (acetaminophen). Do not take more than 4,000 mg of Tylenol (acetaminophen) from all sources to prevent liver damage.    ondansetron (ZOFRAN-ODT) 4 MG ODT tab Take 1 tablet (4 mg) by mouth every 6 hours as needed for nausea    amLODIPine (NORVASC) 10 MG tablet Take 1 tablet (10 mg) by mouth daily    senna-docusate (SENOKOT-S;PERICOLACE) 8.6-50 MG per tablet Take 1-2 tablets by mouth 2 times daily as needed for constipation    pyridOXINE (VITAMIN B-6) 25 MG tablet Take 1 tablet (25 mg) by mouth daily    ciprofloxacin (CIPRO) 500 MG tablet Take 1 tablet (500 mg) by mouth every 24 hours     metoprolol (LOPRESSOR) 50 MG tablet Take 1 tablet (50 mg) by mouth 2 times daily    polyethylene glycol (MIRALAX) powder Take 17 g by mouth daily as needed.    calcium carbonate (TUMS) 500 MG chewable tablet Take 1 chew tab by mouth 3 times daily (with meals).          O:   Temp:  [97.6  F (36.4  C)] 97.6  F (36.4  C)  Pulse:  [74] 74  Resp:  [16] 16  BP: (134)/(82) 134/82  SpO2:  [98 %] 98 %  General Appearance: in no apparent distress.   Skin: Normal, no rashes or jaundice  Heart: regular rate and rhythm  Lungs: easy respirations, no audible wheezing.  Abdomen: obese, The wound is dry and intact, without hernia. The abdomen is non-tender. The kidney graft is not tender.  There is no ascites.  Extremities: Tremor absent.   Edema: absent.      Transplant Immunosuppression Labs Latest Ref Rng & Units 2/14/2017 2/13/2017 2/12/2017 2/11/2017 2/10/2017   Tacro Level 5.0 - 15.0 ug/L 7.1 6.5 4.8(L) <3.0  Tacrolimus Reference Range   Kidney Transplant   Pediatric                      ug/L     0-3 months post transplant   10-12     3-6 months post transplant   8-10     6-12 months post transplant  6-8     >12 months post transplant   4-7   Adult     0-6 months post transplant   8-10     6-12 months post transplant  6-8     >12 months post transplant   4-6     >5 years post transplant     3-5   Heart Transplant   Pediatric     0-12 months post transplant  10-15     >12 months post transplant   5-10   Adult     0-3 months post transplant   10-15     3-6 months post transplant   8-12     6-12 months post transplant  6-12     >12 months post transplant   6-10   Lung Transplant     0-12 months post transplant  10-15     >12 months post transplant   8-12   Liver Transplant   Pediatric     0-3 months post transplant   10-15     3-6 months post transplant   8-10     >6 months post transplant    6-8   Adult     0-3 months post transplant   10-12     3-6 months post transplant   8-10     >6   months post transplant    6-8    Pancreas Transplant     0-6 months post transplant   8-10     >6 months post transplant    5-8   This test was developed and its performance characteristics determined by the   [facility],  Special Chemistry Laboratory. It has   not been cleared or approved by the FDA. The laboratory is regulated under CLIA   as qualified to perform high-complexity testing. This test is used for clinical   purposes. It should not be regarded as investigational or for research.  (L) 3.5(L)   Tacro Level - Not Provided Not Provided Not Provided Not Provided Not Provided   Creat 0.52 - 1.04 mg/dL 5.92(H) 6.82(H) 7.53(H) 7.56(H) 6.83(H)   BUN 7 - 30 mg/dL 78(H) 80(H) 80(H) 61(H) 47(H)   WBC 4.0 - 11.0 10e9/L 6.7 6.7 5.0 3.0(L) 7.5   Neutrophil % 92.4 90.9 - 87.8 94.7   ANEU 1.6 - 8.3 10e9/L 6.2 6.1 - 2.7 7.1       Chemistries:   Recent Labs   Lab Test  02/14/17   0655   BUN  78*   CR  5.92*   GFRESTIMATED  7*   GLC  160*     Lab Results   Component Value Date    A1C 4.0 02/08/2017     Recent Labs   Lab Test  02/11/17   0610   ALBUMIN  2.8*   BILITOTAL  0.6   ALKPHOS  39*   AST  14   ALT  27     Urine Studies:  Recent Labs   Lab Test  04/11/12   1151   COLOR  Yellow   APPEARANCE  Slightly Cloudy   URINEGLC  Negative   URINEBILI  Negative   URINEKETONE  Negative   SG  1.012   UBLD  Negative   URINEPH  8.0*   PROTEIN  30*   NITRITE  Negative   LEUKEST  Large*   RBCU  1   WBCU  6*     Recent Labs   Lab Test  02/14/17   0740  02/10/17   1250   UTPG  0.81*  2.71*     Hematology:   Recent Labs   Lab Test  02/14/17   0655  02/13/17   0718  02/12/17   0715   HGB  10.0*  10.2*  9.8*   PLT  159  137*  127*   WBC  6.7  6.7  5.0     Coags:   Recent Labs   Lab Test  02/08/17   1333  09/10/16   0428   INR  1.13  1.08     HLA antibodies:   SA1 Hi Risk Denise   Date Value Ref Range Status   02/07/2017 No specificity defined  Final     SA1 Mod Risk Denise   Date Value Ref Range Status   02/07/2017 B:44 82 Cw:16  Final     SA2 Hi  Risk Denise   Date Value Ref Range Status   02/07/2017 None  Final     SA2 Mod Risk Denise   Date Value Ref Range Status   02/07/2017 DR:8  Final       Assessment: ***    Plan:    1. Graft function: ***  2. Immunosuppression Management: {CHANGED/UNCHANGED/DC'D:502229} ***.  Complexity of management:{LOW, MEDIUM, HIGH:311230}.  Contributing factors: {CONTRIBUTING FACTORS:983063}  3. ***: ***  Followup: ***    Total Time: *** min,   Counselling Time: *** min.    {[facility] TRANSPLANT MD SIGNATURE:[id number]}      Again, thank you for allowing me to participate in the care of your patient.      Sincerely,    Jose Naranjo MD

## 2017-03-08 DIAGNOSIS — Z94.0 KIDNEY REPLACED BY TRANSPLANT: ICD-10-CM

## 2017-03-08 DIAGNOSIS — Z48.298 AFTERCARE FOLLOWING ORGAN TRANSPLANT: ICD-10-CM

## 2017-03-08 PROCEDURE — 80197 ASSAY OF TACROLIMUS: CPT | Performed by: INTERNAL MEDICINE

## 2017-03-09 LAB
TACROLIMUS BLD-MCNC: 10 UG/L (ref 5–15)
TME LAST DOSE: NORMAL H

## 2017-03-10 DIAGNOSIS — Z94.0 KIDNEY REPLACED BY TRANSPLANT: ICD-10-CM

## 2017-03-10 DIAGNOSIS — Z48.298 AFTERCARE FOLLOWING ORGAN TRANSPLANT: ICD-10-CM

## 2017-03-10 LAB
TACROLIMUS BLD-MCNC: 11 UG/L (ref 5–15)
TME LAST DOSE: NORMAL H

## 2017-03-10 PROCEDURE — 80197 ASSAY OF TACROLIMUS: CPT | Performed by: INTERNAL MEDICINE

## 2017-03-13 ENCOUNTER — TELEPHONE (OUTPATIENT)
Dept: PHARMACY | Facility: CLINIC | Age: 52
End: 2017-03-13

## 2017-03-14 ENCOUNTER — TELEPHONE (OUTPATIENT)
Dept: TRANSPLANT | Facility: CLINIC | Age: 52
End: 2017-03-14

## 2017-03-14 DIAGNOSIS — Z94.0 KIDNEY REPLACED BY TRANSPLANT: ICD-10-CM

## 2017-03-14 DIAGNOSIS — Z48.298 AFTERCARE FOLLOWING ORGAN TRANSPLANT: ICD-10-CM

## 2017-03-14 PROCEDURE — 80197 ASSAY OF TACROLIMUS: CPT | Performed by: INTERNAL MEDICINE

## 2017-03-14 NOTE — LETTER
OUTPATIENT LABORATORY TEST ORDER    Patient Name:Angelica Romero   Transplant Date: 2/8/2017  YOB: 1965  Issue Date & Time: 3/14/2017  2:15 PM    [facility] MR: [id number]  Expiration Date:  (1 year after date issued)        Diagnoses: Aftercare following organ transplant (ICD-10  Z48.288)   Kidney Transplant (ICD-10  Z94.0)   Long term use of medications (ICD-10  Z79.899)     ?Lab results to be available on the same day drawn.   ?Patient should release information to the [facility], [facility].  ?Please fax to the Transplant Center at [phone].    2x/week, Months 2-3 post-transplant    3/9/2017 - 5/9/2017       1x/week, Months 4-6 post-transplant    5/10/2017 - 8/10/2017  Every 2 weeks, Months 7-9 post-transplant    8/11/2017 - 11/11/2017  Every 3 weeks, Months 10-12 post-transplant   11/12/2017 - 2/8/2018       ?Hemogram and Platelet   ?Basic Metabolic Panel (Sodium, Potassium,Chloride, CO2, Creatinine, Urea Nitrogen, Glucose, Calcium)   ?Prograf/Tacrolimus drug level     Weekly through first 3 months post-transplant    2/8/2017 - 5/8/2017   ?Phosphorus, Magnesium   ?MPA level (mycophenolic acid) once per week for first 3 months.    ?Please add a diff to hemogram once per week for the first 3 months.    Monthly:   ? BK PCR Quantitative (Polyoma virus - blood in purple top tube to Reference Lab)   *CMV PCR QT    At 6 & 12 months post-transplant         8/2017 & 2/2018   ? HBsurfaceAb, HBcoreAb, HCV at 6 months only -   ? Liver Function Tests(Bilirubin Direct/Total, AST, ALT, Alkaline Phosphatase)   ?Complete Lipid Panel fasting (Cholesterol, Triglycerides, HDL, LDL)   ? Random Urine for Protein/Creatinineratio    At month(s) 1, 2, 4, 6, 9, 12      3/2017, 4/2017, 6/2017, 8/2017, 11/2017, 2/2018                  ? PRA/DSA level (mailers provided by the patient)  If you have any questions, please call The Transplant Center at [phone] or [phone].     Please faxall results to [phone].        Jose Naranjo MD  Department of Surgery

## 2017-03-14 NOTE — TELEPHONE ENCOUNTER
Issue   Called confirmed that increased Valcyte 450 mg once per day     CMV Mismatch -Updated orders to monitor CMV PCR QT monthly

## 2017-03-15 DIAGNOSIS — Z94.0 KIDNEY TRANSPLANTED: Primary | ICD-10-CM

## 2017-03-15 LAB
TACROLIMUS BLD-MCNC: 8.5 UG/L (ref 5–15)
TME LAST DOSE: NORMAL H

## 2017-03-16 DIAGNOSIS — Z94.0 KIDNEY REPLACED BY TRANSPLANT: ICD-10-CM

## 2017-03-16 DIAGNOSIS — Z48.298 AFTERCARE FOLLOWING ORGAN TRANSPLANT: ICD-10-CM

## 2017-03-16 LAB
TACROLIMUS BLD-MCNC: 11.1 UG/L (ref 5–15)
TME LAST DOSE: NORMAL H

## 2017-03-16 PROCEDURE — 80197 ASSAY OF TACROLIMUS: CPT | Performed by: INTERNAL MEDICINE

## 2017-03-17 LAB
TACROLIMUS BLD-MCNC: 10.9 UG/L (ref 5–15)
TME LAST DOSE: NORMAL H

## 2017-03-21 DIAGNOSIS — Z48.298 AFTERCARE FOLLOWING ORGAN TRANSPLANT: ICD-10-CM

## 2017-03-21 DIAGNOSIS — Z94.0 KIDNEY REPLACED BY TRANSPLANT: ICD-10-CM

## 2017-03-21 PROCEDURE — 80197 ASSAY OF TACROLIMUS: CPT | Performed by: INTERNAL MEDICINE

## 2017-03-23 DIAGNOSIS — Z48.298 AFTERCARE FOLLOWING ORGAN TRANSPLANT: ICD-10-CM

## 2017-03-23 DIAGNOSIS — Z94.0 KIDNEY REPLACED BY TRANSPLANT: ICD-10-CM

## 2017-03-23 PROCEDURE — 80197 ASSAY OF TACROLIMUS: CPT | Performed by: INTERNAL MEDICINE

## 2017-03-24 LAB
TACROLIMUS BLD-MCNC: 12.7 UG/L (ref 5–15)
TME LAST DOSE: NORMAL H

## 2017-03-26 LAB
TACROLIMUS BLD-MCNC: 10.8 UG/L (ref 5–15)
TME LAST DOSE: NORMAL H

## 2017-03-30 ENCOUNTER — OFFICE VISIT (OUTPATIENT)
Dept: NEPHROLOGY | Facility: CLINIC | Age: 52
End: 2017-03-30
Attending: INTERNAL MEDICINE
Payer: MEDICARE

## 2017-03-30 VITALS
WEIGHT: 185.6 LBS | BODY MASS INDEX: 34.16 KG/M2 | TEMPERATURE: 97.9 F | SYSTOLIC BLOOD PRESSURE: 119 MMHG | HEART RATE: 77 BPM | HEIGHT: 62 IN | OXYGEN SATURATION: 99 % | DIASTOLIC BLOOD PRESSURE: 74 MMHG

## 2017-03-30 DIAGNOSIS — N25.81 SECONDARY RENAL HYPERPARATHYROIDISM (H): ICD-10-CM

## 2017-03-30 DIAGNOSIS — Z48.298 AFTERCARE FOLLOWING ORGAN TRANSPLANT: ICD-10-CM

## 2017-03-30 DIAGNOSIS — D63.1 ANEMIA IN CHRONIC RENAL DISEASE: ICD-10-CM

## 2017-03-30 DIAGNOSIS — Z94.0 KIDNEY REPLACED BY TRANSPLANT: ICD-10-CM

## 2017-03-30 DIAGNOSIS — I15.1 HYPERTENSION SECONDARY TO OTHER RENAL DISORDERS: ICD-10-CM

## 2017-03-30 DIAGNOSIS — D84.9 IMMUNOSUPPRESSED STATUS (H): ICD-10-CM

## 2017-03-30 DIAGNOSIS — N18.9 ANEMIA IN CHRONIC RENAL DISEASE: ICD-10-CM

## 2017-03-30 DIAGNOSIS — E83.39 HYPOPHOSPHATEMIA: ICD-10-CM

## 2017-03-30 DIAGNOSIS — E55.9 VITAMIN D DEFICIENCY: ICD-10-CM

## 2017-03-30 DIAGNOSIS — K21.9 GASTROESOPHAGEAL REFLUX DISEASE WITHOUT ESOPHAGITIS: Primary | ICD-10-CM

## 2017-03-30 PROBLEM — Z22.7 LATENT TUBERCULOSIS: Status: ACTIVE | Noted: 2017-03-30

## 2017-03-30 LAB
ANION GAP SERPL CALCULATED.3IONS-SCNC: 10 MMOL/L (ref 3–14)
BASOPHILS # BLD AUTO: 0 10E9/L (ref 0–0.2)
BASOPHILS NFR BLD AUTO: 0.3 %
BUN SERPL-MCNC: 14 MG/DL (ref 7–30)
CALCIUM SERPL-MCNC: 9.5 MG/DL (ref 8.5–10.1)
CHLORIDE SERPL-SCNC: 108 MMOL/L (ref 94–109)
CO2 SERPL-SCNC: 25 MMOL/L (ref 20–32)
CREAT SERPL-MCNC: 1.25 MG/DL (ref 0.52–1.04)
DIFFERENTIAL METHOD BLD: ABNORMAL
EOSINOPHIL # BLD AUTO: 0.1 10E9/L (ref 0–0.7)
EOSINOPHIL NFR BLD AUTO: 2.1 %
ERYTHROCYTE [DISTWIDTH] IN BLOOD BY AUTOMATED COUNT: 14.2 % (ref 10–15)
FERRITIN SERPL-MCNC: 823 NG/ML (ref 8–252)
GFR SERPL CREATININE-BSD FRML MDRD: 45 ML/MIN/1.7M2
GLUCOSE SERPL-MCNC: 104 MG/DL (ref 70–99)
HCT VFR BLD AUTO: 31.4 % (ref 35–47)
HGB BLD-MCNC: 10.4 G/DL (ref 11.7–15.7)
IMM GRANULOCYTES # BLD: 0 10E9/L (ref 0–0.4)
IMM GRANULOCYTES NFR BLD: 0.3 %
IRON SATN MFR SERPL: 17 % (ref 15–46)
IRON SERPL-MCNC: 38 UG/DL (ref 35–180)
LYMPHOCYTES # BLD AUTO: 0.4 10E9/L (ref 0.8–5.3)
LYMPHOCYTES NFR BLD AUTO: 7.2 %
MAGNESIUM SERPL-MCNC: 1.8 MG/DL (ref 1.6–2.3)
MCH RBC QN AUTO: 31 PG (ref 26.5–33)
MCHC RBC AUTO-ENTMCNC: 33.1 G/DL (ref 31.5–36.5)
MCV RBC AUTO: 94 FL (ref 78–100)
MONOCYTES # BLD AUTO: 0.3 10E9/L (ref 0–1.3)
MONOCYTES NFR BLD AUTO: 5.8 %
NEUTROPHILS # BLD AUTO: 4.9 10E9/L (ref 1.6–8.3)
NEUTROPHILS NFR BLD AUTO: 84.3 %
NRBC # BLD AUTO: 0 10*3/UL
NRBC BLD AUTO-RTO: 0 /100
PHOSPHATE SERPL-MCNC: 1.4 MG/DL (ref 2.5–4.5)
PLATELET # BLD AUTO: 320 10E9/L (ref 150–450)
POTASSIUM SERPL-SCNC: 3.5 MMOL/L (ref 3.4–5.3)
PTH-INTACT SERPL-MCNC: 259 PG/ML (ref 12–72)
RBC # BLD AUTO: 3.36 10E12/L (ref 3.8–5.2)
SODIUM SERPL-SCNC: 142 MMOL/L (ref 133–144)
TIBC SERPL-MCNC: 220 UG/DL (ref 240–430)
WBC # BLD AUTO: 5.8 10E9/L (ref 4–11)

## 2017-03-30 PROCEDURE — 80048 BASIC METABOLIC PNL TOTAL CA: CPT | Performed by: INTERNAL MEDICINE

## 2017-03-30 PROCEDURE — 82306 VITAMIN D 25 HYDROXY: CPT | Performed by: INTERNAL MEDICINE

## 2017-03-30 PROCEDURE — 36415 COLL VENOUS BLD VENIPUNCTURE: CPT | Performed by: INTERNAL MEDICINE

## 2017-03-30 PROCEDURE — 83970 ASSAY OF PARATHORMONE: CPT | Performed by: INTERNAL MEDICINE

## 2017-03-30 PROCEDURE — 85004 AUTOMATED DIFF WBC COUNT: CPT | Performed by: INTERNAL MEDICINE

## 2017-03-30 PROCEDURE — 83735 ASSAY OF MAGNESIUM: CPT | Performed by: INTERNAL MEDICINE

## 2017-03-30 PROCEDURE — 99212 OFFICE O/P EST SF 10 MIN: CPT | Mod: ZF

## 2017-03-30 PROCEDURE — 83550 IRON BINDING TEST: CPT | Performed by: INTERNAL MEDICINE

## 2017-03-30 PROCEDURE — 82728 ASSAY OF FERRITIN: CPT | Performed by: INTERNAL MEDICINE

## 2017-03-30 PROCEDURE — 85027 COMPLETE CBC AUTOMATED: CPT | Performed by: INTERNAL MEDICINE

## 2017-03-30 PROCEDURE — 83540 ASSAY OF IRON: CPT | Performed by: INTERNAL MEDICINE

## 2017-03-30 PROCEDURE — 84100 ASSAY OF PHOSPHORUS: CPT | Performed by: INTERNAL MEDICINE

## 2017-03-30 ASSESSMENT — PAIN SCALES - GENERAL: PAINLEVEL: NO PAIN (0)

## 2017-03-30 NOTE — MR AVS SNAPSHOT
After Visit Summary   3/30/2017    Angelica Romero    MRN: [id number]           Patient Information     Date Of Birth          1965        Visit Information        Provider Department      3/30/2017 1:50 PM Bill Tejeda MD [facility]        Today's Diagnoses     Gastroesophageal reflux disease without esophagitis    -  1    Hypophosphatemia        Secondary renal hyperparathyroidism (H)        Anemia in chronic renal disease        Kidney replaced by transplant        Aftercare following organ transplant        Immunosuppressed status (H)        Hypertension secondary to other renal disorders        Vitamin D deficiency          Care Instructions    Increase oral phosphorus supplement to 500 mg (2 pills) twice daily.  Start omeprazole (Prilosec) 20 mg before bed.        Follow-ups after your visit        Your next 10 appointments already scheduled     Jun 08, 2017  1:50 PM CDT   (Arrive by 1:20 PM)   Return Kidney Transplant with Bill Tejeda MD   [facility] ([facility] [place])    [address] [place] [id number] [phone]              Who to contact     If you have questions or need follow up information about today's clinic visit or your schedule please contact [facility] directly at [phone].  Normal or non-critical lab and imaging results will be communicated to you by MyChart, letter or phone within 4 business days after the clinic has received the results. If you do not hear from us within 7 days, please contact the clinic through MyChart or phone. If you have a critical or abnormal lab result, we will notify you by phone as soon as possible.  Submit refill requests through [employer] or call your pharmacy and they will forward the refill request to us. Please allow 3 business days for your refill to be completed.          Additional Information About Your Visit        MyChart Information      "[facility] gives you secure access to your electronic health record. If you see a primary care provider, you can also send messages to your care team and make appointments. If you have questions, please call your primary care clinic.  If you do not have a primary care provider, please call [phone] and they will assist you.        Care EveryWhere ID     This is your Care EveryWhere ID. This could be used by other organizations to access your [place] medical records  [id number]        Your Vitals Were     Pulse Temperature Height Pulse Oximetry BMI (Body Mass Index)       77 97.9  F (36.6  C) (Oral) 1.575 m (5' 2\") 99% 33.95 kg/m2        Blood Pressure from Last 3 Encounters:   03/30/17 119/74   03/07/17 134/82   02/14/17 149/70    Weight from Last 3 Encounters:   03/30/17 84.2 kg (185 lb 9.6 oz)   02/14/17 88.8 kg (195 lb 12.8 oz)   02/12/17 92.1 kg (203 lb)                 Today's Medication Changes          These changes are accurate as of: 3/30/17  8:59 PM.  If you have any questions, ask your nurse or doctor.               Start taking these medicines.        Dose/Directions    omeprazole 20 MG CR capsule   Commonly known as:  priLOSEC   Used for:  Gastroesophageal reflux disease without esophagitis   Started by:  Bill Tejeda MD        Dose:  20 mg   Take 1 capsule (20 mg) by mouth daily   Quantity:  30 capsule   Refills:  3         These medicines have changed or have updated prescriptions.        Dose/Directions    phosphorus tablet 250 mg 250 MG per tablet   Commonly known as:  K PHOS NEUTRAL   This may have changed:    - how much to take  - when to take this   Used for:  Hypophosphatemia   Changed by:  Bill Tejeda MD        Dose:  500 mg   Take 2 tablets (500 mg) by mouth 2 times daily   Quantity:  120 tablet   Refills:  3         Stop taking these medicines if you haven't already. Please contact your care team if you have questions.     ciprofloxacin 500 MG tablet   Commonly known " as:  CIPRO   Stopped by:  Bill Tejeda MD           oxyCODONE-acetaminophen 5-325 MG per tablet   Commonly known as:  PERCOCET   Stopped by:  Bill Tejeda MD           TUMS 500 MG chewable tablet   Generic drug:  calcium carbonate   Stopped by:  Bill Tejeda MD                Where to get your medicines      These medications were sent to [facility] [address] [place] [id number]     Phone:  [phone]     omeprazole 20 MG CR capsule    phosphorus tablet 250 mg 250 MG per tablet                Primary Care Provider Office Phone # Fax #    Harry Laboy [phone]       [facility] [address] [place] [id number]        Thank you!     Thank you for choosing [facility]  for your care. Our goal is always to provide you with excellent care. Hearing back from our patients is one way we can continue to improve our services. Please take a few minutes to complete the written survey that you may receive in the mail after your visit with us. Thank you!             Your Updated Medication List - Protect others around you: Learn how to safely use, store and throw away your medicines at www.disposemymeds.org.          This list is accurate as of: 3/30/17  8:59 PM.  Always use your most recent med list.                   Brand Name Dispense Instructions for use    amLODIPine 10 MG tablet    NORVASC    30 tablet    Take 1 tablet (10 mg) by mouth daily       isoniazid 300 MG tablet    NYDRAZID    30 tablet    Take 1 tablet (300 mg) by mouth daily Take for 9 months total. You should have your liver function tests drawn monthly while on this medication.       magnesium oxide 400 MG tablet    MAG-OX    120 tablet    Take 2 tablets (800 mg) by mouth 2 times daily       metoprolol 50 MG tablet    LOPRESSOR    60 tablet    Take 1 tablet (50 mg) by mouth 2 times daily       MIRALAX powder   Generic drug:   polyethylene glycol      Take 17 g by mouth daily as needed.       mycophenolate 250 MG capsule    CELLCEPT - GENERIC EQUIVALENT    180 capsule    Take 3 capsules (750 mg) by mouth 2 times daily       omeprazole 20 MG CR capsule    priLOSEC    30 capsule    Take 1 capsule (20 mg) by mouth daily       ondansetron 4 MG ODT tab    ZOFRAN-ODT    30 tablet    Take 1 tablet (4 mg) by mouth every 6 hours as needed for nausea       phosphorus tablet 250 mg 250 MG per tablet    K PHOS NEUTRAL    120 tablet    Take 2 tablets (500 mg) by mouth 2 times daily       pyridOXINE 25 MG tablet    vitamin B-6    30 tablet    Take 1 tablet (25 mg) by mouth daily       senna-docusate 8.6-50 MG per tablet    SENOKOT-S;PERICOLACE    100 tablet    Take 1-2 tablets by mouth 2 times daily as needed for constipation       sulfamethoxazole-trimethoprim 400-80 MG per tablet    BACTRIM/SEPTRA    12 tablet    Take 1 tablet by mouth three times a week Take on Monday, Wednesday, and Saturday. The dose of this medication may need to be changed as your kidney function improves (ask your Nephrologist).       * tacrolimus 0.5 MG capsule    PROGRAF - GENERIC EQUIVALENT    60 capsule    Take 1 capsule (0.5 mg) by mouth 2 times daily Total dose = 3.5 mg BID       * tacrolimus 1 MG capsule    PROGRAF - GENERIC EQUIVALENT    180 capsule    Take 3 capsules (3 mg) by mouth 2 times daily Total dose = 3.5 mg BID       valGANciclovir 450 MG tablet    VALCYTE    30 tablet    Take 1 tablet (450 mg) by mouth daily You should take this medication for 6 months total after your transplant.       * Notice:  This list has 2 medication(s) that are the same as other medications prescribed for you. Read the directions carefully, and ask your doctor or other care provider to review them with you.

## 2017-03-30 NOTE — NURSING NOTE
"Chief Complaint   Patient presents with     RECHECK     Follow up ESKD       Initial /74  Pulse 77  Temp 97.9  F (36.6  C) (Oral)  Ht 1.575 m (5' 2\")  Wt 84.2 kg (185 lb 9.6 oz)  SpO2 99%  BMI 33.95 kg/m2 Estimated body mass index is 33.95 kg/(m^2) as calculated from the following:    Height as of this encounter: 1.575 m (5' 2\").    Weight as of this encounter: 84.2 kg (185 lb 9.6 oz).  Medication Reconciliation: complete   Mariely Valentino. CMA    "

## 2017-03-30 NOTE — LETTER
3/30/2017    RE[facility]  [address] [facility] [facility] [address]       Assessment and Plan:  1. DDKT - baseline Cr ~ 1.3-1.5, which has remained stable.  No DSA.  Will make no changes in immunosuppression.  2. HTN - well controlled at target of less than 140/90.  No changes.  3. Anemia in chronic renal disease - Hgb has been a bit variable recently from 9.2 -11.6 mg/dl, likely due to changes in volume status.  Slightly iron deficient and recommend starting oral iron 325 mg daily at lunch.  Will follow.  4. Secondary renal hyperparathyroidism - moderately elevated PTH, which should improve post transplant.  Will start calcitriol 0.25 mcg daily.  Will repeat PTH at 3 months post transplant.  5. Vitamin D deficiency - vitamin D level pending.  6. Hypophosphatemia - low serum phosphorus level and will increase oral phosphorus supplement to 500 mg bid.  Will also start calcitriol.  Discussed increased dietary phosphorus.  7. Hypomagnesemia - normal serum magnesium level and will continue on oral magnesium supplement.  8. GERD - increased symptoms, possibly due to mycophenolate mofetil.  Will start omeprazole 20 mg qHS.  9. Latent TB - patient to continue INH and vitamin B6 until November 2017.  10. Overweight - recommend increased exercise as able and watch caloric intake.  11. Recommend return visit at 3 months post transplant.    Assessment and plan was discussed with patient and she voiced her understanding and agreement.    Reason for Visit:  Ms. Romero is here for routine follow up.    HPI:   Angelica Romero is a 51 year old female with ESKD from unclear etiology and is status post DDKT on 2/8/17.         Transplant Hx:       Tx: DDKT  Date: 2/8/17       Present Maintenance IS: Tacrolimus and Mycophenolate mofetil       Baseline Creatinine: 1.3-1.5       Recent DSA: No  Date last checked: 2/2017       Biopsy: No    Ms. Romero reports feeling good overall with some medical complaints.  Her energy level has been improving,  "but not quite normal yet.  She is active and doing some walking.  Denies any chest pain or shortness of breath with exertion.  Appetite is \"wonderful,\" but her weight is down about 15-20 lbs, mostly water weight.  No nausea, but has had some bad heartburn symptoms causing a couple of episodes of vomiting.  No diarrhea.  No fever, sweats or chills.  Now without leg swelling.  No pain or burning with urination.    Home BP: 130/80s.      ROS:   A comprehensive review of systems was obtained and negative, except as noted in the HPI or PMH.    Active Medical Problems:  Patient Active Problem List   Diagnosis     Hypertension secondary to other renal disorders     Obesity, unspecified     Kidney replaced by transplant     Immunosuppressed status (H)     Aftercare following organ transplant     Anemia in chronic renal disease     Secondary renal hyperparathyroidism (H)     Vitamin D deficiency     Gastroesophageal reflux disease without esophagitis     Hypophosphatemia     Latent tuberculosis       Personal Hx:  Social History     Social History     Marital status:      Spouse name: N/A     Number of children: N/A     Years of education: N/A     Occupational History     Not on file.     Social History Main Topics     Smoking status: Former Smoker     Packs/day: 0.20     Years: 15.00     Types: Cigarettes     Quit date: 1/1/2016     Smokeless tobacco: Never Used     Alcohol use 1.8 oz/week     3 Standard drinks or equivalent per week     Drug use: No     Sexual activity: Not on file     Other Topics Concern     Not on file     Social History Narrative       Allergies:  No Known Allergies    Medications:  Prior to Admission medications    Medication Sig Start Date End Date Taking? Authorizing Provider   phosphorus tablet 250 mg (K PHOS NEUTRAL) 250 MG per tablet Take 2 tablets (500 mg) by mouth 2 times daily 3/30/17  Yes Bill Tejeda MD   omeprazole (PRILOSEC) 20 MG CR capsule Take 1 capsule (20 mg) by mouth " daily 3/30/17  Yes Bill Tejeda MD   magnesium oxide (MAG-OX) 400 MG tablet Take 2 tablets (800 mg) by mouth 2 times daily 3/7/17  Yes Jose Naranjo MD   valGANciclovir (VALCYTE) 450 MG tablet Take 1 tablet (450 mg) by mouth daily You should take this medication for 6 months total after your transplant. 2/23/17  Yes Bill Tejeda MD   isoniazid (NYDRAZID) 300 MG tablet Take 1 tablet (300 mg) by mouth daily Take for 9 months total. You should have your liver function tests drawn monthly while on this medication. 2/17/17  Yes Thony Montes MD   mycophenolate (CELLCEPT - GENERIC EQUIVALENT) 250 MG capsule Take 3 capsules (750 mg) by mouth 2 times daily 2/17/17  Yes Thony Montes MD   sulfamethoxazole-trimethoprim (BACTRIM/SEPTRA) 400-80 MG per tablet Take 1 tablet by mouth three times a week Take on Monday, Wednesday, and Saturday. The dose of this medication may need to be changed as your kidney function improves (ask your Nephrologist). 2/18/17  Yes Thony Montes MD   tacrolimus (PROGRAF - GENERIC EQUIVALENT) 0.5 MG capsule Take 1 capsule (0.5 mg) by mouth 2 times daily Total dose = 3.5 mg BID 2/17/17  Yes Thony Montes MD   tacrolimus (PROGRAF - GENERIC EQUIVALENT) 1 MG capsule Take 3 capsules (3 mg) by mouth 2 times daily Total dose = 3.5 mg BID 2/17/17  Yes Thony Montes MD   ondansetron (ZOFRAN-ODT) 4 MG ODT tab Take 1 tablet (4 mg) by mouth every 6 hours as needed for nausea 2/11/17  Yes Felisha Coy MD   amLODIPine (NORVASC) 10 MG tablet Take 1 tablet (10 mg) by mouth daily 2/11/17  Yes Felisha Coy MD   senna-docusate (SENOKOT-S;PERICOLACE) 8.6-50 MG per tablet Take 1-2 tablets by mouth 2 times daily as needed for constipation 2/11/17  Yes Felisha Coy MD   pyridOXINE (VITAMIN B-6) 25 MG tablet Take 1 tablet (25 mg) by mouth daily 2/11/17  Yes Felisha Coy MD   metoprolol (LOPRESSOR) 50 MG tablet Take 1 tablet (50 mg) by mouth 2 times daily  "2/11/17  Yes Felisha Coy MD   polyethylene glycol (MIRALAX) powder Take 17 g by mouth daily as needed.   Yes Reported, Patient       Vitals:  /74  Pulse 77  Temp 97.9  F (36.6  C) (Oral)  Ht 1.575 m (5' 2\")  Wt 84.2 kg (185 lb 9.6 oz)  SpO2 99%  BMI 33.95 kg/m2    Exam:   GENERAL APPEARANCE: alert and no distress  HENT: mouth without ulcers or lesions  LYMPHATICS: no cervical or supraclavicular nodes  RESP: lungs clear to auscultation - no rales, rhonchi or wheezes  CV: regular rhythm, normal rate, no rub, no murmur  EDEMA: no LE edema bilaterally  ABDOMEN: soft, nondistended, nontender, bowel sounds normal, overweight  MS: extremities normal - no gross deformities noted, no evidence of inflammation in joints, no muscle tenderness  SKIN: no rash  TX KIDNEY: normal    Results:   Recent Results (from the past 72 hour(s))   TXP External Lab Result    Collection Time: 03/28/17 10:06 AM   Result Value Ref Range    WBC Count (External) 7.3 4.1 - 10.9 x10^3/uL    RBC Count (External) 3.71 (L) 3.85 - 5.05 x10^6/uL    Hemoglobin (External) 11.6 (L) 11.7 - 15.5 g/dL    Hematocrit (External) 35.2 35.0 - 46.0 %    MCV (External) 94.9 82.0 - 99.0 fL    MCH (External) 31.2 27.0 - 34.0 pg    MCHC (External) 32.9 32.0 - 36.0 g/dL    RDW (External) 14.4 11.5 - 15.0 %    Platelet Count (External) 314 150 - 450 x10^3/uL    MPV (External) 7.2 6.0 - 9.5 fL    Sodium (External) 143 133 - 144 mmol/L    Potassium (External) 3.9 3.4 - 5.1 mmol/L    Chloride (External) 106 96 - 109 mmol/L    CO2 (External) 24 21 - 33 mmol/L    Anion Gap (External) 13 2 - 16 mmol/L    Urea Nitrogen (External) 13 6 - 24 mg/dL    Creatinine (External) 1.4 (H) 0.4 - 1.0 mg/dL    Glucose (External) 118 (H) 70 - 99 mg/dL    Calcium (External) 10.0 8.2 - 10.0 mg/dL    GFR Estimated (External) 39.5 (L) 60.0 - 150.0 mL/min    Phosphorus (External) 1.6 (L) 2.6 - 4.7 mg/dL    Magnesium (External) 1.4 (L) 1.7 - 2.4 mg/dL    Tacrolimus(FK-506) " (External) 14.9 5.0 - 15.0 ng/mL   TXP External Lab Result    Collection Time: 03/30/17  9:18 AM   Result Value Ref Range    WBC Count (External) 6.0 4.1 - 10.9 x10^3/uL    RBC Count (External) 3.52 (L) 3.85 - 5.05 x10^6/uL    Hemoglobin (External) 11.1 (L) 11.7 - 15.5 g/dL    Hematocrit (External) 32.5 (L) 35.0 - 46.0 %    MCV (External) 92.3 82.0 - 99.0 fL    MCH (External) 31.5 27.0 - 34.0 pg    MCHC (External) 34.1 32.0 - 36.0 g/dL    RDW (External) 14.2 11.5 - 15.0 %    Platelet Count (External) 296 150 - 450 x10^3/uL    MPV (External) 6.7 6.0 - 9.5 fL    Sodium (External) 141 133 - 144 mmol/L    Potassium (External) 3.8 3.4 - 5.1 mmol/L    Chloride (External) 105 96 - 109 mmol/L    CO2 (External) 22 21 - 33 mmol/L    Anion Gap (External) 14 2 - 16 mmol/L    Urea Nitrogen (External) 12 6 - 24 mg/dL    Creatinine (External) 1.3 (H) 0.4 - 1.0 mg/dL    Glucose (External) 122 (H) 70 - 99 mg/dL    Calcium (External) 9.7 8.2 - 10.0 mg/dL    GFR Estimated (External) 43.0 (L) 60.0 - 150.0 mL/min   Parathyroid Hormone Intact    Collection Time: 03/30/17  3:28 PM   Result Value Ref Range    Parathyroid Hormone Intact 259 (H) 12 - 72 pg/mL   IRON AND IRON BINDING CAPACITY    Collection Time: 03/30/17  3:28 PM   Result Value Ref Range    Iron 38 35 - 180 ug/dL    Iron Binding Cap 220 (L) 240 - 430 ug/dL    Iron Saturation Index 17 15 - 46 %   FERRITIN    Collection Time: 03/30/17  3:28 PM   Result Value Ref Range    Ferritin 823 (H) 8 - 252 ng/mL   Phosphorus    Collection Time: 03/30/17  3:28 PM   Result Value Ref Range    Phosphorus 1.4 (L) 2.5 - 4.5 mg/dL   Magnesium    Collection Time: 03/30/17  3:28 PM   Result Value Ref Range    Magnesium 1.8 1.6 - 2.3 mg/dL   WBC Differential    Collection Time: 03/30/17  3:28 PM   Result Value Ref Range    Diff Method Automated Method     % Neutrophils 84.3 %    % Lymphocytes 7.2 %    % Monocytes 5.8 %    % Eosinophils 2.1 %    % Basophils 0.3 %    % Immature Granulocytes 0.3 %     Nucleated RBCs 0 0 /100    Absolute Neutrophil 4.9 1.6 - 8.3 10e9/L    Absolute Lymphocytes 0.4 (L) 0.8 - 5.3 10e9/L    Absolute Monocytes 0.3 0.0 - 1.3 10e9/L    Absolute Eosinophils 0.1 0.0 - 0.7 10e9/L    Absolute Basophils 0.0 0.0 - 0.2 10e9/L    Abs Immature Granulocytes 0.0 0 - 0.4 10e9/L    Absolute Nucleated RBC 0.0    CBC with platelets    Collection Time: 03/30/17  3:28 PM   Result Value Ref Range    WBC 5.8 4.0 - 11.0 10e9/L    RBC Count 3.36 (L) 3.8 - 5.2 10e12/L    Hemoglobin 10.4 (L) 11.7 - 15.7 g/dL    Hematocrit 31.4 (L) 35.0 - 47.0 %    MCV 94 78 - 100 fl    MCH 31.0 26.5 - 33.0 pg    MCHC 33.1 31.5 - 36.5 g/dL    RDW 14.2 10.0 - 15.0 %    Platelet Count 320 150 - 450 10e9/L   Basic metabolic panel    Collection Time: 03/30/17  3:28 PM   Result Value Ref Range    Sodium 142 133 - 144 mmol/L    Potassium 3.5 3.4 - 5.3 mmol/L    Chloride 108 94 - 109 mmol/L    Carbon Dioxide 25 20 - 32 mmol/L    Anion Gap 10 3 - 14 mmol/L    Glucose 104 (H) 70 - 99 mg/dL    Urea Nitrogen 14 7 - 30 mg/dL    Creatinine 1.25 (H) 0.52 - 1.04 mg/dL    GFR Estimate 45 (L) >60 mL/min/1.7m2    GFR Estimate If Black 54 (L) >60 mL/min/1.7m2    Calcium 9.5 8.5 - 10.1 mg/dL       Bill Tejeda MD

## 2017-03-30 NOTE — PATIENT INSTRUCTIONS
Increase oral phosphorus supplement to 500 mg (2 pills) twice daily.  Start omeprazole (Prilosec) 20 mg before bed.

## 2017-03-31 DIAGNOSIS — Z94.0 DECEASED-DONOR KIDNEY TRANSPLANT: ICD-10-CM

## 2017-03-31 DIAGNOSIS — Z22.7 LATENT TUBERCULOSIS BY BLOOD TEST: ICD-10-CM

## 2017-03-31 DIAGNOSIS — Z48.298 AFTERCARE FOLLOWING ORGAN TRANSPLANT: ICD-10-CM

## 2017-03-31 DIAGNOSIS — Z94.0 KIDNEY REPLACED BY TRANSPLANT: Primary | ICD-10-CM

## 2017-03-31 LAB — DEPRECATED CALCIDIOL+CALCIFEROL SERPL-MC: 28 UG/L (ref 20–75)

## 2017-03-31 RX ORDER — CALCITRIOL 0.25 UG/1
0.25 CAPSULE, LIQUID FILLED ORAL DAILY
Qty: 90 CAPSULE | Refills: 0 | Status: SHIPPED | OUTPATIENT
Start: 2017-03-31 | End: 2017-12-18

## 2017-03-31 NOTE — PROGRESS NOTES
Assessment and Plan:  1. DDKT - baseline Cr ~ 1.3-1.5, which has remained stable.  No DSA.  Will make no changes in immunosuppression.  2. HTN - well controlled at target of less than 140/90.  No changes.  3. Anemia in chronic renal disease - Hgb has been a bit variable recently from 9.2 -11.6 mg/dl, likely due to changes in volume status.  Slightly iron deficient and recommend starting oral iron 325 mg daily at lunch.  Will follow.  4. Secondary renal hyperparathyroidism - moderately elevated PTH, which should improve post transplant.  Will start calcitriol 0.25 mcg daily.  Will repeat PTH at 3 months post transplant.  5. Vitamin D deficiency - vitamin D level pending.  6. Hypophosphatemia - low serum phosphorus level and will increase oral phosphorus supplement to 500 mg bid.  Will also start calcitriol.  Discussed increased dietary phosphorus.  7. Hypomagnesemia - normal serum magnesium level and will continue on oral magnesium supplement.  8. GERD - increased symptoms, possibly due to mycophenolate mofetil.  Will start omeprazole 20 mg qHS.  9. Latent TB - patient to continue INH and vitamin B6 until November 2017.  10. Overweight - recommend increased exercise as able and watch caloric intake.  11. Recommend return visit at 3 months post transplant.    Assessment and plan was discussed with patient and she voiced her understanding and agreement.    Reason for Visit:  Ms. Romero is here for routine follow up.    HPI:   Angelica Romero is a 51 year old female with ESKD from unclear etiology and is status post DDKT on 2/8/17.         Transplant Hx:       Tx: DDKT  Date: 2/8/17       Present Maintenance IS: Tacrolimus and Mycophenolate mofetil       Baseline Creatinine: 1.3-1.5       Recent DSA: No  Date last checked: 2/2017       Biopsy: No    Ms. Romero reports feeling good overall with some medical complaints.  Her energy level has been improving, but not quite normal yet.  She is active and doing some walking.   "Denies any chest pain or shortness of breath with exertion.  Appetite is \"wonderful,\" but her weight is down about 15-20 lbs, mostly water weight.  No nausea, but has had some bad heartburn symptoms causing a couple of episodes of vomiting.  No diarrhea.  No fever, sweats or chills.  Now without leg swelling.  No pain or burning with urination.    Home BP: 130/80s.      ROS:   A comprehensive review of systems was obtained and negative, except as noted in the HPI or PMH.    Active Medical Problems:  Patient Active Problem List   Diagnosis     Hypertension secondary to other renal disorders     Obesity, unspecified     Kidney replaced by transplant     Immunosuppressed status (H)     Aftercare following organ transplant     Anemia in chronic renal disease     Secondary renal hyperparathyroidism (H)     Vitamin D deficiency     Gastroesophageal reflux disease without esophagitis     Hypophosphatemia     Latent tuberculosis       Personal Hx:  Social History     Social History     Marital status:      Spouse name: N/A     Number of children: N/A     Years of education: N/A     Occupational History     Not on file.     Social History Main Topics     Smoking status: Former Smoker     Packs/day: 0.20     Years: 15.00     Types: Cigarettes     Quit date: 1/1/2016     Smokeless tobacco: Never Used     Alcohol use 1.8 oz/week     3 Standard drinks or equivalent per week     Drug use: No     Sexual activity: Not on file     Other Topics Concern     Not on file     Social History Narrative       Allergies:  No Known Allergies    Medications:  Prior to Admission medications    Medication Sig Start Date End Date Taking? Authorizing Provider   phosphorus tablet 250 mg (K PHOS NEUTRAL) 250 MG per tablet Take 2 tablets (500 mg) by mouth 2 times daily 3/30/17  Yes Bill Tejeda MD   omeprazole (PRILOSEC) 20 MG CR capsule Take 1 capsule (20 mg) by mouth daily 3/30/17  Yes Bill Tejeda MD   magnesium oxide " (MAG-OX) 400 MG tablet Take 2 tablets (800 mg) by mouth 2 times daily 3/7/17  Yes Jose Naranjo MD   valGANciclovir (VALCYTE) 450 MG tablet Take 1 tablet (450 mg) by mouth daily You should take this medication for 6 months total after your transplant. 2/23/17  Yes Bill Tejeda MD   isoniazid (NYDRAZID) 300 MG tablet Take 1 tablet (300 mg) by mouth daily Take for 9 months total. You should have your liver function tests drawn monthly while on this medication. 2/17/17  Yes Thony Montes MD   mycophenolate (CELLCEPT - GENERIC EQUIVALENT) 250 MG capsule Take 3 capsules (750 mg) by mouth 2 times daily 2/17/17  Yes Thony Monets MD   sulfamethoxazole-trimethoprim (BACTRIM/SEPTRA) 400-80 MG per tablet Take 1 tablet by mouth three times a week Take on Monday, Wednesday, and Saturday. The dose of this medication may need to be changed as your kidney function improves (ask your Nephrologist). 2/18/17  Yes Thony Montes MD   tacrolimus (PROGRAF - GENERIC EQUIVALENT) 0.5 MG capsule Take 1 capsule (0.5 mg) by mouth 2 times daily Total dose = 3.5 mg BID 2/17/17  Yes Thony Montes MD   tacrolimus (PROGRAF - GENERIC EQUIVALENT) 1 MG capsule Take 3 capsules (3 mg) by mouth 2 times daily Total dose = 3.5 mg BID 2/17/17  Yes Thony Montes MD   ondansetron (ZOFRAN-ODT) 4 MG ODT tab Take 1 tablet (4 mg) by mouth every 6 hours as needed for nausea 2/11/17  Yes Felisha Coy MD   amLODIPine (NORVASC) 10 MG tablet Take 1 tablet (10 mg) by mouth daily 2/11/17  Yes Felisha Coy MD   senna-docusate (SENOKOT-S;PERICOLACE) 8.6-50 MG per tablet Take 1-2 tablets by mouth 2 times daily as needed for constipation 2/11/17  Yes Felisha Coy MD   pyridOXINE (VITAMIN B-6) 25 MG tablet Take 1 tablet (25 mg) by mouth daily 2/11/17  Yes Felisha Coy MD   metoprolol (LOPRESSOR) 50 MG tablet Take 1 tablet (50 mg) by mouth 2 times daily 2/11/17  Yes Felisha Coy MD   polyethylene glycol  "(MIRALAX) powder Take 17 g by mouth daily as needed.   Yes Reported, Patient       Vitals:  /74  Pulse 77  Temp 97.9  F (36.6  C) (Oral)  Ht 1.575 m (5' 2\")  Wt 84.2 kg (185 lb 9.6 oz)  SpO2 99%  BMI 33.95 kg/m2    Exam:   GENERAL APPEARANCE: alert and no distress  HENT: mouth without ulcers or lesions  LYMPHATICS: no cervical or supraclavicular nodes  RESP: lungs clear to auscultation - no rales, rhonchi or wheezes  CV: regular rhythm, normal rate, no rub, no murmur  EDEMA: no LE edema bilaterally  ABDOMEN: soft, nondistended, nontender, bowel sounds normal, overweight  MS: extremities normal - no gross deformities noted, no evidence of inflammation in joints, no muscle tenderness  SKIN: no rash  TX KIDNEY: normal    Results:   Recent Results (from the past 72 hour(s))   TXP External Lab Result    Collection Time: 03/28/17 10:06 AM   Result Value Ref Range    WBC Count (External) 7.3 4.1 - 10.9 x10^3/uL    RBC Count (External) 3.71 (L) 3.85 - 5.05 x10^6/uL    Hemoglobin (External) 11.6 (L) 11.7 - 15.5 g/dL    Hematocrit (External) 35.2 35.0 - 46.0 %    MCV (External) 94.9 82.0 - 99.0 fL    MCH (External) 31.2 27.0 - 34.0 pg    MCHC (External) 32.9 32.0 - 36.0 g/dL    RDW (External) 14.4 11.5 - 15.0 %    Platelet Count (External) 314 150 - 450 x10^3/uL    MPV (External) 7.2 6.0 - 9.5 fL    Sodium (External) 143 133 - 144 mmol/L    Potassium (External) 3.9 3.4 - 5.1 mmol/L    Chloride (External) 106 96 - 109 mmol/L    CO2 (External) 24 21 - 33 mmol/L    Anion Gap (External) 13 2 - 16 mmol/L    Urea Nitrogen (External) 13 6 - 24 mg/dL    Creatinine (External) 1.4 (H) 0.4 - 1.0 mg/dL    Glucose (External) 118 (H) 70 - 99 mg/dL    Calcium (External) 10.0 8.2 - 10.0 mg/dL    GFR Estimated (External) 39.5 (L) 60.0 - 150.0 mL/min    Phosphorus (External) 1.6 (L) 2.6 - 4.7 mg/dL    Magnesium (External) 1.4 (L) 1.7 - 2.4 mg/dL    Tacrolimus(FK-506) (External) 14.9 5.0 - 15.0 ng/mL   TXP External Lab Result    " Collection Time: 03/30/17  9:18 AM   Result Value Ref Range    WBC Count (External) 6.0 4.1 - 10.9 x10^3/uL    RBC Count (External) 3.52 (L) 3.85 - 5.05 x10^6/uL    Hemoglobin (External) 11.1 (L) 11.7 - 15.5 g/dL    Hematocrit (External) 32.5 (L) 35.0 - 46.0 %    MCV (External) 92.3 82.0 - 99.0 fL    MCH (External) 31.5 27.0 - 34.0 pg    MCHC (External) 34.1 32.0 - 36.0 g/dL    RDW (External) 14.2 11.5 - 15.0 %    Platelet Count (External) 296 150 - 450 x10^3/uL    MPV (External) 6.7 6.0 - 9.5 fL    Sodium (External) 141 133 - 144 mmol/L    Potassium (External) 3.8 3.4 - 5.1 mmol/L    Chloride (External) 105 96 - 109 mmol/L    CO2 (External) 22 21 - 33 mmol/L    Anion Gap (External) 14 2 - 16 mmol/L    Urea Nitrogen (External) 12 6 - 24 mg/dL    Creatinine (External) 1.3 (H) 0.4 - 1.0 mg/dL    Glucose (External) 122 (H) 70 - 99 mg/dL    Calcium (External) 9.7 8.2 - 10.0 mg/dL    GFR Estimated (External) 43.0 (L) 60.0 - 150.0 mL/min   Parathyroid Hormone Intact    Collection Time: 03/30/17  3:28 PM   Result Value Ref Range    Parathyroid Hormone Intact 259 (H) 12 - 72 pg/mL   IRON AND IRON BINDING CAPACITY    Collection Time: 03/30/17  3:28 PM   Result Value Ref Range    Iron 38 35 - 180 ug/dL    Iron Binding Cap 220 (L) 240 - 430 ug/dL    Iron Saturation Index 17 15 - 46 %   FERRITIN    Collection Time: 03/30/17  3:28 PM   Result Value Ref Range    Ferritin 823 (H) 8 - 252 ng/mL   Phosphorus    Collection Time: 03/30/17  3:28 PM   Result Value Ref Range    Phosphorus 1.4 (L) 2.5 - 4.5 mg/dL   Magnesium    Collection Time: 03/30/17  3:28 PM   Result Value Ref Range    Magnesium 1.8 1.6 - 2.3 mg/dL   WBC Differential    Collection Time: 03/30/17  3:28 PM   Result Value Ref Range    Diff Method Automated Method     % Neutrophils 84.3 %    % Lymphocytes 7.2 %    % Monocytes 5.8 %    % Eosinophils 2.1 %    % Basophils 0.3 %    % Immature Granulocytes 0.3 %    Nucleated RBCs 0 0 /100    Absolute Neutrophil 4.9 1.6 - 8.3  10e9/L    Absolute Lymphocytes 0.4 (L) 0.8 - 5.3 10e9/L    Absolute Monocytes 0.3 0.0 - 1.3 10e9/L    Absolute Eosinophils 0.1 0.0 - 0.7 10e9/L    Absolute Basophils 0.0 0.0 - 0.2 10e9/L    Abs Immature Granulocytes 0.0 0 - 0.4 10e9/L    Absolute Nucleated RBC 0.0    CBC with platelets    Collection Time: 03/30/17  3:28 PM   Result Value Ref Range    WBC 5.8 4.0 - 11.0 10e9/L    RBC Count 3.36 (L) 3.8 - 5.2 10e12/L    Hemoglobin 10.4 (L) 11.7 - 15.7 g/dL    Hematocrit 31.4 (L) 35.0 - 47.0 %    MCV 94 78 - 100 fl    MCH 31.0 26.5 - 33.0 pg    MCHC 33.1 31.5 - 36.5 g/dL    RDW 14.2 10.0 - 15.0 %    Platelet Count 320 150 - 450 10e9/L   Basic metabolic panel    Collection Time: 03/30/17  3:28 PM   Result Value Ref Range    Sodium 142 133 - 144 mmol/L    Potassium 3.5 3.4 - 5.3 mmol/L    Chloride 108 94 - 109 mmol/L    Carbon Dioxide 25 20 - 32 mmol/L    Anion Gap 10 3 - 14 mmol/L    Glucose 104 (H) 70 - 99 mg/dL    Urea Nitrogen 14 7 - 30 mg/dL    Creatinine 1.25 (H) 0.52 - 1.04 mg/dL    GFR Estimate 45 (L) >60 mL/min/1.7m2    GFR Estimate If Black 54 (L) >60 mL/min/1.7m2    Calcium 9.5 8.5 - 10.1 mg/dL

## 2017-03-31 NOTE — TELEPHONE ENCOUNTER
Called Angelica Romero  Regarding Prograf tacrolimus level 14.5   Confirmed 12 hour Prograf tacrolimus trough level   Lowering Prograf tacrolimus from   4.5 mg twice per day to Prograf tacrolimus 3.5 mg twice per day         CMV mismatch   Plan to increase monitoring CMV PCR QT q month  Improved kidney function    Increase Valcyte  900 mg once per day

## 2017-03-31 NOTE — LETTER
OUTPATIENT LABORATORY TEST ORDER    Patient Name:Angelica Romero   Transplant Date: 2/8/2017  YOB: 1965  Issue Date & Time: 4/3/2017  12:40 PM    [facility] MR: [id number]  Expiration Date:  (1 year after date issued)        Diagnoses: Aftercare following organ transplant (ICD-10  Z48.288)   Kidney Transplant (ICD-10  Z94.0)   Long term use of medications (ICD-10  Z79.899)     ?Lab results to be available on the same day drawn.   ?Patient should release information to the [facility], [facility].  ?Please fax to the Transplant Center at [phone].    2x/week, Months 2-3 post-transplant    3/9/2017 - 5/9/2017       1x/week, Months 4-6 post-transplant    5/10/2017 - 8/10/2017  Every 2 weeks, Months 7-9 post-transplant    8/11/2017 - 11/11/2017  Every 3 weeks, Months 10-12 post-transplant   11/12/2017 - 2/8/2018       ?Hemogram and Platelet   ?Basic Metabolic Panel (Sodium, Potassium,Chloride, CO2, Creatinine, Urea Nitrogen, Glucose, Calcium)   ?Prograf/Tacrolimus drug level     Weekly through first 3 months post-transplant    2/8/2017 - 5/8/2017   ?Phosphorus, Magnesium   ?MPA level (mycophenolic acid) once per week for first 3 months.    ?Please add a diff to hemogram once per week for the first 3 months.    Monthly:   ? BK PCR Quantitative (Polyoma virus - blood in purple top tube to Reference Lab)   *CMV PCR QT    At 6 & 12 months post-transplant         8/2017 & 2/2018   ? HBsurfaceAb, HBcoreAb, HCV at 6 months only -   ? Liver Function Tests(Bilirubin Direct/Total, AST, ALT, Alkaline Phosphatase)   ?Complete Lipid Panel fasting (Cholesterol, Triglycerides, HDL, LDL)   ? Random Urine for Protein/Creatinineratio    At month(s) 1, 2, 4, 6, 9, 12      3/2017, 4/2017, 6/2017, 8/2017, 11/2017, 2/2018                  ? PRA/DSA level (mailers provided by the patient)  If you have any questions, please call The Transplant Center at [phone] or [phone].     Please faxall results to [phone].        Jose Naranjo MD  Department of Surgery

## 2017-04-03 RX ORDER — TACROLIMUS 1 MG/1
3 CAPSULE ORAL 2 TIMES DAILY
Qty: 180 CAPSULE | Refills: 11 | Status: SHIPPED | OUTPATIENT
Start: 2017-04-03 | End: 2017-04-17

## 2017-04-03 RX ORDER — VALGANCICLOVIR 450 MG/1
900 TABLET, FILM COATED ORAL DAILY
Qty: 60 TABLET | Refills: 1 | Status: SHIPPED | OUTPATIENT
Start: 2017-04-03 | End: 2017-04-26

## 2017-04-03 RX ORDER — TACROLIMUS 0.5 MG/1
0.5 CAPSULE ORAL 2 TIMES DAILY
Qty: 60 CAPSULE | Refills: 11 | Status: SHIPPED | OUTPATIENT
Start: 2017-04-03 | End: 2017-04-17

## 2017-04-06 DIAGNOSIS — Z94.0 KIDNEY REPLACED BY TRANSPLANT: ICD-10-CM

## 2017-04-06 DIAGNOSIS — Z48.298 AFTERCARE FOLLOWING ORGAN TRANSPLANT: ICD-10-CM

## 2017-04-06 PROCEDURE — 80197 ASSAY OF TACROLIMUS: CPT | Performed by: INTERNAL MEDICINE

## 2017-04-09 LAB
TACROLIMUS BLD-MCNC: 10.4 UG/L (ref 5–15)
TME LAST DOSE: NORMAL H

## 2017-04-11 ENCOUNTER — TELEPHONE (OUTPATIENT)
Dept: TRANSPLANT | Facility: CLINIC | Age: 52
End: 2017-04-11

## 2017-04-11 DIAGNOSIS — Z94.0 KIDNEY REPLACED BY TRANSPLANT: ICD-10-CM

## 2017-04-11 DIAGNOSIS — Z48.298 AFTERCARE FOLLOWING ORGAN TRANSPLANT: ICD-10-CM

## 2017-04-11 PROCEDURE — 80197 ASSAY OF TACROLIMUS: CPT | Performed by: INTERNAL MEDICINE

## 2017-04-13 DIAGNOSIS — Z94.0 KIDNEY REPLACED BY TRANSPLANT: ICD-10-CM

## 2017-04-13 DIAGNOSIS — Z48.298 AFTERCARE FOLLOWING ORGAN TRANSPLANT: ICD-10-CM

## 2017-04-13 LAB
TACROLIMUS BLD-MCNC: 9.1 UG/L (ref 5–15)
TME LAST DOSE: NORMAL H

## 2017-04-13 PROCEDURE — 80197 ASSAY OF TACROLIMUS: CPT | Performed by: INTERNAL MEDICINE

## 2017-04-16 DIAGNOSIS — Z22.7 LATENT TUBERCULOSIS BY BLOOD TEST: ICD-10-CM

## 2017-04-16 DIAGNOSIS — Z94.0 DECEASED-DONOR KIDNEY TRANSPLANT: ICD-10-CM

## 2017-04-16 DIAGNOSIS — Z48.298 AFTERCARE FOLLOWING ORGAN TRANSPLANT: Primary | ICD-10-CM

## 2017-04-16 DIAGNOSIS — Z94.0 KIDNEY REPLACED BY TRANSPLANT: ICD-10-CM

## 2017-04-16 LAB
TACROLIMUS BLD-MCNC: 7.6 UG/L (ref 5–15)
TME LAST DOSE: NORMAL H

## 2017-04-16 NOTE — TELEPHONE ENCOUNTER
Issue tacrolimus  7.6 slightly below goal level   Plan   Call confirm taking tacrolimus  3.5 mg twice per day of tacrolimus   Increase tacrolimus 4.0 mg twice per day

## 2017-04-17 RX ORDER — TACROLIMUS 0.5 MG/1
CAPSULE ORAL
Qty: 60 CAPSULE | Refills: 11
Start: 2017-04-17 | End: 2017-05-09

## 2017-04-17 RX ORDER — TACROLIMUS 1 MG/1
3 CAPSULE ORAL 2 TIMES DAILY
Qty: 180 CAPSULE | Refills: 11 | Status: SHIPPED | OUTPATIENT
Start: 2017-04-17 | End: 2017-05-09

## 2017-04-17 NOTE — TELEPHONE ENCOUNTER
Spoke to patient regarding tac level = 7.6, below goal.  Patient confirms current dose in 2.5 mg BID.  Instructed patient to increase dose to 3 mg BID, patient verbalizes understanding.

## 2017-04-18 DIAGNOSIS — Z94.0 KIDNEY REPLACED BY TRANSPLANT: ICD-10-CM

## 2017-04-18 DIAGNOSIS — Z48.298 AFTERCARE FOLLOWING ORGAN TRANSPLANT: ICD-10-CM

## 2017-04-18 PROCEDURE — 80197 ASSAY OF TACROLIMUS: CPT | Performed by: INTERNAL MEDICINE

## 2017-04-19 ENCOUNTER — TELEPHONE (OUTPATIENT)
Dept: PHARMACY | Facility: CLINIC | Age: 52
End: 2017-04-19

## 2017-04-20 DIAGNOSIS — Z94.0 KIDNEY REPLACED BY TRANSPLANT: ICD-10-CM

## 2017-04-20 DIAGNOSIS — Z48.298 AFTERCARE FOLLOWING ORGAN TRANSPLANT: ICD-10-CM

## 2017-04-20 LAB
TACROLIMUS BLD-MCNC: 10.2 UG/L (ref 5–15)
TME LAST DOSE: NORMAL H

## 2017-04-20 PROCEDURE — 80197 ASSAY OF TACROLIMUS: CPT | Performed by: INTERNAL MEDICINE

## 2017-04-25 DIAGNOSIS — Z48.298 AFTERCARE FOLLOWING ORGAN TRANSPLANT: ICD-10-CM

## 2017-04-25 DIAGNOSIS — Z94.0 KIDNEY REPLACED BY TRANSPLANT: ICD-10-CM

## 2017-04-25 PROCEDURE — 80197 ASSAY OF TACROLIMUS: CPT | Performed by: INTERNAL MEDICINE

## 2017-04-26 DIAGNOSIS — Z94.0 DECEASED-DONOR KIDNEY TRANSPLANT: ICD-10-CM

## 2017-04-26 DIAGNOSIS — Z94.0 KIDNEY REPLACED BY TRANSPLANT: Primary | ICD-10-CM

## 2017-04-26 DIAGNOSIS — Z22.7 LATENT TUBERCULOSIS BY BLOOD TEST: ICD-10-CM

## 2017-04-26 DIAGNOSIS — Z48.298 AFTERCARE FOLLOWING ORGAN TRANSPLANT: ICD-10-CM

## 2017-04-26 LAB
TACROLIMUS BLD-MCNC: 9.5 UG/L (ref 5–15)
TME LAST DOSE: NORMAL H

## 2017-04-26 RX ORDER — VALGANCICLOVIR 450 MG/1
900 TABLET, FILM COATED ORAL DAILY
Qty: 60 TABLET | Refills: 1 | Status: SHIPPED | OUTPATIENT
Start: 2017-04-26 | End: 2017-09-12

## 2017-04-26 NOTE — TELEPHONE ENCOUNTER
Patient discussed at the acute kidney meeting on 4/24/2017.  Per Dr. Tejeda/Dr. Montes patient should have PTH and Vit. D. Levels repeated @ 3 mo.  LFT's, CMV and BK labs drawn monthly.  Updated current standing lab orders.  Patient verbalizes understanding.  Encouraged patient to have good 12 hour trough levels at the time of transplant lab draw.  Encouraged high phos and mag diet due to low levels while continuing to take supplements.  Patient should continue Valcyte and added Cholecalciferol 2000 mg daily.  Patient verbalizes understanding.  Patient has no other questions/concerns at this time.

## 2017-04-26 NOTE — LETTER
OUTPATIENT LABORATORY TEST ORDER    Patient Name:Angelica Romero   Transplant Date: 2/8/2017  YOB: 1965  Issue Date & Time: 4/26/2017  2:41 PM  [facility] MR: [id number]  Expiration Date:  (1 year after date issued)      Diagnoses: Aftercare following organ transplant (ICD-10  Z48.288)   Kidney Transplant (ICD-10  Z94.0)   Long term use of medications (ICD-10  Z79.899)     ?Lab results to be available on the same day drawn.   ?Patient should release information to the [facility], [facility].  ?Please fax to the Transplant Center at [phone].    2x/week, Months 2-3 post-transplant    3/9/2017 - 5/9/2017       1x/week, Months 4-6 post-transplant    5/10/2017 - 8/10/2017  Every 2 weeks, Months 7-9 post-transplant    8/11/2017 - 11/11/2017  Every 3 weeks, Months 10-12 post-transplant   11/12/2017 - 2/8/2018       ?Hemogram and Platelet   ?Basic Metabolic Panel (Sodium, Potassium,Chloride, CO2, Creatinine, Urea Nitrogen, Glucose, Calcium)   ?Prograf/Tacrolimus drug level   Weekly through first 3 months post-transplant    2/8/2017 - 5/8/2017   ?Phosphorus, Magnesium   ?MPA level (mycophenolic acid) once per week for first 3 months.    ?Please add a diff to hemogram once per week for the first 3 months.  Monthly:   ? BK PCR Quantitative (Polyoma virus - blood in purple top tube to Reference Lab)   ? CMV PCR QT   ? LFT'S  At 3 months post transplant 5/2017   ? PTH   ? Vitamin D  At 6 & 12 months post-transplant         8/2017 & 2/2018   ? HBsurfaceAb, HBcoreAb, HCV at 6 months only -   ? Liver Function Tests(Bilirubin Direct/Total, AST, ALT, Alkaline Phosphatase)   ?Complete Lipid Panel fasting (Cholesterol, Triglycerides, HDL, LDL)   ? Random Urine for Protein/Creatinineratio  At month(s) 1, 2, 4, 6, 9, 12      3/2017, 4/2017, 6/2017, 8/2017, 11/2017, 2/2018                  ? PRA/DSA level (mailers provided by the patient)  If you have any questions, please call The  Transplant Center at [phone] or [phone].    Please faxall results to [phone].        Jose Naranjo MD  Department of Surgery

## 2017-04-27 DIAGNOSIS — Z94.0 KIDNEY REPLACED BY TRANSPLANT: ICD-10-CM

## 2017-04-27 DIAGNOSIS — Z48.298 AFTERCARE FOLLOWING ORGAN TRANSPLANT: ICD-10-CM

## 2017-04-27 PROCEDURE — 80197 ASSAY OF TACROLIMUS: CPT | Performed by: INTERNAL MEDICINE

## 2017-04-28 LAB
TACROLIMUS BLD-MCNC: 11.2 UG/L (ref 5–15)
TME LAST DOSE: NORMAL H

## 2017-04-30 LAB
TACROLIMUS BLD-MCNC: 10.5 UG/L (ref 5–15)
TME LAST DOSE: NORMAL H

## 2017-05-04 DIAGNOSIS — Z48.298 AFTERCARE FOLLOWING ORGAN TRANSPLANT: ICD-10-CM

## 2017-05-04 DIAGNOSIS — Z94.0 KIDNEY REPLACED BY TRANSPLANT: ICD-10-CM

## 2017-05-04 PROCEDURE — 80197 ASSAY OF TACROLIMUS: CPT | Performed by: INTERNAL MEDICINE

## 2017-05-07 LAB
TACROLIMUS BLD-MCNC: 11.4 UG/L (ref 5–15)
TME LAST DOSE: NORMAL H

## 2017-05-08 DIAGNOSIS — Z22.7 LATENT TUBERCULOSIS BY BLOOD TEST: ICD-10-CM

## 2017-05-08 DIAGNOSIS — Z94.0 KIDNEY REPLACED BY TRANSPLANT: Primary | ICD-10-CM

## 2017-05-08 DIAGNOSIS — Z48.298 AFTERCARE FOLLOWING ORGAN TRANSPLANT: ICD-10-CM

## 2017-05-08 DIAGNOSIS — Z94.0 DECEASED-DONOR KIDNEY TRANSPLANT: ICD-10-CM

## 2017-05-08 NOTE — TELEPHONE ENCOUNTER
Left message for patient regarding tac level = 11.2.  Instructed patient call back and confirm good 12 hour trough and current dose.

## 2017-05-08 NOTE — TELEPHONE ENCOUNTER
Issue Prograf tacrolimus 11.2   After increase Prograf tacrolimus 3.0 mg twice per day   Confirm Prograf tacrolimus level 12 hour trough level  Lower Prograf tacrolimus 2.5 mg twice per day

## 2017-05-09 RX ORDER — TACROLIMUS 0.5 MG/1
0.5 CAPSULE ORAL 2 TIMES DAILY
Qty: 60 CAPSULE | Refills: 11 | Status: SHIPPED | OUTPATIENT
Start: 2017-05-09 | End: 2017-06-28

## 2017-05-09 RX ORDER — TACROLIMUS 1 MG/1
2 CAPSULE ORAL 2 TIMES DAILY
Qty: 120 CAPSULE | Refills: 11 | Status: SHIPPED | OUTPATIENT
Start: 2017-05-09 | End: 2017-06-28

## 2017-05-16 ENCOUNTER — TELEPHONE (OUTPATIENT)
Dept: PHARMACY | Facility: CLINIC | Age: 52
End: 2017-05-16

## 2017-05-16 DIAGNOSIS — Z48.298 AFTERCARE FOLLOWING ORGAN TRANSPLANT: ICD-10-CM

## 2017-05-16 DIAGNOSIS — Z94.0 KIDNEY REPLACED BY TRANSPLANT: ICD-10-CM

## 2017-05-16 PROCEDURE — 80197 ASSAY OF TACROLIMUS: CPT | Performed by: INTERNAL MEDICINE

## 2017-05-17 ENCOUNTER — TELEPHONE (OUTPATIENT)
Dept: TRANSPLANT | Facility: CLINIC | Age: 52
End: 2017-05-17

## 2017-05-17 NOTE — TELEPHONE ENCOUNTER
"----- Message from Molina Villalobos [facility] sent at 5/16/2017  1:47 PM CDT -----  Regarding: Patient having issues with vomiting  Hi Nereida,     I just spoke with Angelica this afternoon. She states that she is having issues with stomach aches and vomiting nearly every other day after taking her medications. She didn't elaborate much but kept saying \"it just happens after I take my meds\". I tried to get a better frequency and timing but I couldn't. I feel like she may need some follow up from you on this?     Thanks,   Molina Villalobos, PharmD  [facility]  [phone]    "

## 2017-05-17 NOTE — TELEPHONE ENCOUNTER
Left message for patient regarding f/u with PCP regarding vomiting issues.  Asked patient to call back and confirm PPI dose and confirm any recent weight loss.

## 2017-05-17 NOTE — TELEPHONE ENCOUNTER
Please call Angelica Romero have her follow up with PCP within the next week regarding her vomiting --   Local PCP increased PPI to twice per day  --   Ask if any weight loss ?       Transplant  Labs and drug levels are stable post transplant  --   Sent message to Dr Tejeda   Follow up with Dr Tejeda the first week on June 2017  -  May need to return to clinic at early date -

## 2017-05-18 LAB
TACROLIMUS BLD-MCNC: 9.3 UG/L (ref 5–15)
TME LAST DOSE: NORMAL H

## 2017-05-19 NOTE — TELEPHONE ENCOUNTER
Left message for patient regarding f/u with PCP regarding vomiting issues. Asked patient to call back and confirm PPI dose and confirm any recent weight loss

## 2017-05-23 DIAGNOSIS — Z94.0 KIDNEY REPLACED BY TRANSPLANT: ICD-10-CM

## 2017-05-23 DIAGNOSIS — Z48.298 AFTERCARE FOLLOWING ORGAN TRANSPLANT: ICD-10-CM

## 2017-05-23 PROCEDURE — 80197 ASSAY OF TACROLIMUS: CPT | Performed by: INTERNAL MEDICINE

## 2017-05-25 LAB
TACROLIMUS BLD-MCNC: 8.7 UG/L (ref 5–15)
TME LAST DOSE: NORMAL H

## 2017-05-30 DIAGNOSIS — Z48.298 AFTERCARE FOLLOWING ORGAN TRANSPLANT: ICD-10-CM

## 2017-05-30 DIAGNOSIS — Z94.0 KIDNEY REPLACED BY TRANSPLANT: ICD-10-CM

## 2017-05-30 PROCEDURE — 80197 ASSAY OF TACROLIMUS: CPT | Performed by: INTERNAL MEDICINE

## 2017-06-02 LAB
TACROLIMUS BLD-MCNC: 9.7 UG/L (ref 5–15)
TME LAST DOSE: NORMAL H

## 2017-06-06 DIAGNOSIS — Z48.298 AFTERCARE FOLLOWING ORGAN TRANSPLANT: ICD-10-CM

## 2017-06-06 DIAGNOSIS — Z94.0 KIDNEY REPLACED BY TRANSPLANT: ICD-10-CM

## 2017-06-06 PROCEDURE — 80197 ASSAY OF TACROLIMUS: CPT | Performed by: INTERNAL MEDICINE

## 2017-06-08 ENCOUNTER — OFFICE VISIT (OUTPATIENT)
Dept: NEPHROLOGY | Facility: CLINIC | Age: 52
End: 2017-06-08
Attending: INTERNAL MEDICINE
Payer: MEDICARE

## 2017-06-08 VITALS
WEIGHT: 189.6 LBS | BODY MASS INDEX: 34.89 KG/M2 | OXYGEN SATURATION: 99 % | TEMPERATURE: 98.8 F | DIASTOLIC BLOOD PRESSURE: 84 MMHG | HEART RATE: 74 BPM | SYSTOLIC BLOOD PRESSURE: 143 MMHG | HEIGHT: 62 IN

## 2017-06-08 DIAGNOSIS — Z48.298 AFTERCARE FOLLOWING ORGAN TRANSPLANT: ICD-10-CM

## 2017-06-08 DIAGNOSIS — I15.1 HYPERTENSION SECONDARY TO OTHER RENAL DISORDERS: ICD-10-CM

## 2017-06-08 DIAGNOSIS — D84.9 IMMUNOSUPPRESSED STATUS (H): ICD-10-CM

## 2017-06-08 DIAGNOSIS — N18.9 ANEMIA IN CHRONIC RENAL DISEASE: ICD-10-CM

## 2017-06-08 DIAGNOSIS — E55.9 VITAMIN D DEFICIENCY: ICD-10-CM

## 2017-06-08 DIAGNOSIS — E83.39 HYPOPHOSPHATEMIA: ICD-10-CM

## 2017-06-08 DIAGNOSIS — N25.81 SECONDARY RENAL HYPERPARATHYROIDISM (H): ICD-10-CM

## 2017-06-08 DIAGNOSIS — D63.1 ANEMIA IN CHRONIC RENAL DISEASE: ICD-10-CM

## 2017-06-08 DIAGNOSIS — I15.1 HYPERTENSION SECONDARY TO OTHER RENAL DISORDERS (CODE): Primary | ICD-10-CM

## 2017-06-08 DIAGNOSIS — Z94.0 KIDNEY REPLACED BY TRANSPLANT: ICD-10-CM

## 2017-06-08 LAB
TACROLIMUS BLD-MCNC: 9.4 UG/L (ref 5–15)
TME LAST DOSE: NORMAL H

## 2017-06-08 PROCEDURE — 99212 OFFICE O/P EST SF 10 MIN: CPT | Mod: ZF

## 2017-06-08 RX ORDER — LOSARTAN POTASSIUM 25 MG/1
25 TABLET ORAL AT BEDTIME
Qty: 90 TABLET | Refills: 3 | Status: SHIPPED | OUTPATIENT
Start: 2017-06-08 | End: 2017-12-18

## 2017-06-08 ASSESSMENT — PAIN SCALES - GENERAL: PAINLEVEL: NO PAIN (0)

## 2017-06-08 NOTE — LETTER
6/8/2017      RE: Angelica Romero  [address]  [facility] [address]       Assessment and Plan:  1. DDKT - baseline Cr ~ 1.1-1.3, which has remained stable lately and improved from initial baseline closer to 1.3-1.5 range.  No DSA.  Will make no changes in immunosuppression.  2. HTN - fair control right at target of less than 140/90.  Will start losartan 25 mg qHS.  3. Anemia in chronic renal disease - improved Hgb, now normal.  Continue on oral iron.  Will follow.  4. Secondary renal hyperparathyroidism - mildly elevated PTH, which is improved post transplant.  Will continue on calcitriol.  Will repeat PTH at next visit.  5. Vitamin D deficiency - improved, low level vitamin D level and will continue on cholecalciferol.  6. Hypophosphatemia - previously low serum phosphorus level and will continue on oral phosphorus supplement.  Will recheck phosphorus with next labs.  Discussed increased dietary phosphorus.  7. Hypomagnesemia - slightly low serum magnesium level and will continue on oral magnesium supplement.  8. GERD - improved symptoms and continue on PPI.  9. Latent TB - patient to continue INH and vitamin B6 until November 2017.  10. Abnormal LFTs - mildly elevated LFTs, likely secondary to INH and will need to follow closely.  11. Overweight - recommend increased exercise as able and watch caloric intake.  12. CMV IgG Ab discordant - will continue on Valcyte until 6 months post transplant, then check qmonth CMV PCR until 12 months post transplant.  13. Recommend return visit in 3 months.    Assessment and plan was discussed with patient and she voiced her understanding and agreement.    Reason for Visit:  Ms. Romero is here for routine follow up.    HPI:   Angelica Romero is a 51 year old female with ESKD from unclear etiology and is status post DDKT on 2/8/17.         Transplant Hx:       Tx: DDKT  Date: 2/8/17       Present Maintenance IS: Tacrolimus and Mycophenolate mofetil       Baseline Creatinine: 1.1-1.3      "  Recent DSA: No  Date last checked: 2/2017       Biopsy: No    Ms. Romero reports feeling good overall with some medical complaints.  Her energy level has been \"amazing\" and now back to normal.  She is active, although only gets a little exercise.  Denies any chest pain or shortness of breath with exertion.  Appetite is \"too good\" and she has gained about 5 lbs.  Rare nausea and vomiting.  No diarrhea.  No fever, sweats or chills.  No leg swelling.  No pain or burning with urination.    Home BP: 130-140/80s.      ROS:   A comprehensive review of systems was obtained and negative, except as noted in the HPI or PMH.    Active Medical Problems:  Patient Active Problem List   Diagnosis     Hypertension secondary to other renal disorders     Obesity, unspecified     Kidney replaced by transplant     Immunosuppressed status (H)     Aftercare following organ transplant     Anemia in chronic renal disease     Secondary renal hyperparathyroidism (H)     Vitamin D deficiency     Gastroesophageal reflux disease without esophagitis     Hypophosphatemia     Latent tuberculosis       Personal Hx:  Social History     Social History     Marital status:      Spouse name: N/A     Number of children: N/A     Years of education: N/A     Occupational History     Not on file.     Social History Main Topics     Smoking status: Former Smoker     Packs/day: 0.20     Years: 15.00     Types: Cigarettes     Quit date: 1/1/2016     Smokeless tobacco: Never Used     Alcohol use 1.8 oz/week     3 Standard drinks or equivalent per week     Drug use: No     Sexual activity: Not on file     Other Topics Concern     Not on file     Social History Narrative       Allergies:  No Known Allergies    Medications:  Prior to Admission medications    Medication Sig Start Date End Date Taking? Authorizing Provider   phosphorus tablet 250 mg (K PHOS NEUTRAL) 250 MG per tablet Take 2 tablets (500 mg) by mouth 2 times daily 3/30/17  Yes Bill Tejeda " MD Demetris   omeprazole (PRILOSEC) 20 MG CR capsule Take 1 capsule (20 mg) by mouth daily 3/30/17  Yes Bill Tejeda MD   magnesium oxide (MAG-OX) 400 MG tablet Take 2 tablets (800 mg) by mouth 2 times daily 3/7/17  Yes Jose Naranjo MD   valGANciclovir (VALCYTE) 450 MG tablet Take 1 tablet (450 mg) by mouth daily You should take this medication for 6 months total after your transplant. 2/23/17  Yes Bill Tejeda MD   isoniazid (NYDRAZID) 300 MG tablet Take 1 tablet (300 mg) by mouth daily Take for 9 months total. You should have your liver function tests drawn monthly while on this medication. 2/17/17  Yes Thony Montes MD   mycophenolate (CELLCEPT - GENERIC EQUIVALENT) 250 MG capsule Take 3 capsules (750 mg) by mouth 2 times daily 2/17/17  Yes Thony Montes MD   sulfamethoxazole-trimethoprim (BACTRIM/SEPTRA) 400-80 MG per tablet Take 1 tablet by mouth three times a week Take on Monday, Wednesday, and Saturday. The dose of this medication may need to be changed as your kidney function improves (ask your Nephrologist). 2/18/17  Yes Thony Montes MD   tacrolimus (PROGRAF - GENERIC EQUIVALENT) 0.5 MG capsule Take 1 capsule (0.5 mg) by mouth 2 times daily Total dose = 3.5 mg BID 2/17/17  Yes Thony Montes MD   tacrolimus (PROGRAF - GENERIC EQUIVALENT) 1 MG capsule Take 3 capsules (3 mg) by mouth 2 times daily Total dose = 3.5 mg BID 2/17/17  Yes Thony Montes MD   ondansetron (ZOFRAN-ODT) 4 MG ODT tab Take 1 tablet (4 mg) by mouth every 6 hours as needed for nausea 2/11/17  Yes Felisha Coy MD   amLODIPine (NORVASC) 10 MG tablet Take 1 tablet (10 mg) by mouth daily 2/11/17  Yes Felisha Coy MD   senna-docusate (SENOKOT-S;PERICOLACE) 8.6-50 MG per tablet Take 1-2 tablets by mouth 2 times daily as needed for constipation 2/11/17  Yes Felisha Coy MD   pyridOXINE (VITAMIN B-6) 25 MG tablet Take 1 tablet (25 mg) by mouth daily 2/11/17  Yes Felisha Coy MD  "  metoprolol (LOPRESSOR) 50 MG tablet Take 1 tablet (50 mg) by mouth 2 times daily 2/11/17  Yes Felisha Coy MD   polyethylene glycol (MIRALAX) powder Take 17 g by mouth daily as needed.   Yes Reported, Patient       Vitals:  /84 (BP Location: Right arm, Patient Position: Chair, Cuff Size: Adult Regular)  Pulse 74  Temp 98.8  F (37.1  C) (Oral)  Ht 1.575 m (5' 2\")  Wt 86 kg (189 lb 9.6 oz)  SpO2 99%  BMI 34.68 kg/m2    Exam:   GENERAL APPEARANCE: alert and no distress  HENT: mouth without ulcers or lesions  LYMPHATICS: no cervical or supraclavicular nodes  RESP: lungs clear to auscultation - no rales, rhonchi or wheezes  CV: regular rhythm, normal rate, no rub, no murmur  EDEMA: trace LE edema bilaterally  ABDOMEN: soft, nondistended, nontender, bowel sounds normal, overweight  MS: extremities normal - no gross deformities noted, no evidence of inflammation in joints, no muscle tenderness; left upper extremity AV fistula with good thrill  SKIN: no rash  TX KIDNEY: normal    Results:   Recent Results (from the past 168 hour(s))   Tacrolimus level    Collection Time: 06/06/17 10:26 AM   Result Value Ref Range    Tacrolimus Last Dose [id number]     Tacrolimus Level 9.4 5.0 - 15.0 ug/L   TXP External Lab Result    Collection Time: 06/06/17 10:29 AM   Result Value Ref Range    WBC Count (External) 4.3 4.1 - 10.9 x10^3/uL    RBC Count (External) 4.39 3.85 - 5.05 x10^6/uL    Hemoglobin (External) 13.7 11.7 - 15.5 g/dL    Hematocrit (External) 39.7 35.0 - 46.0 %    MCV (External) 90.4 82.0 - 99.0 fL    MCH (External) 31.2 27.0 - 34.0 pg    MCHC (External) 34.6 32.0 - 36.0 g/dL    RDW (External) 14.9 11.5 - 15.0 %    Platelet Count (External) 289 150 - 450 x10^3/uL    MPV (External) 7.3 6.0 - 9.5 fL    Sodium (External) 142 133 - 144 mmol/L    Potassium (External) 3.7 3.4 - 5.1 mmol/L    Chloride (External) 108 96 - 109 mmol/L    CO2 (External) 25 21 - 33 mmol/L    Anion Gap (External) 9 2 - 16 mmol/L    " Urea Nitrogen (External) 13 6 - 24 mg/dL    Creatinine (External) 1.1 (H) 0.4 - 1.0 mg/dL    Glucose (External) 108 (H) 70 - 99 mg/dL    Calcium (External) 9.2 8.2 - 10.0 mg/dL    GFR Estimated (External) 52.2 (L) 60.0 - 150.0 mL/min    Albumin (External) 3.8 3.3 - 5.0 g/dL    Bilirubin Total (External) 0.7 0.4 - 1.4 mg/dL    Bilirubin Direct (External) 0.2 0.0 - 0.3 mg/dL    AST (External) 45 (H) 14 - 41 U/L    Alk Phosphatase (External) 85 45 - 115 IU/L    ALT (External) 85 (H) 7 - 55 U/L    Protein Total (External) 7.0 6.2 - 8.2 g/dL       Bill Tejeda MD

## 2017-06-08 NOTE — MR AVS SNAPSHOT
After Visit Summary   6/8/2017    Angelica Romero    MRN: [id number]           Patient Information     Date Of Birth          1965        Visit Information        Provider Department      6/8/2017 1:50 PM Bill Tejeda MD [facility]        Today's Diagnoses     Hypertension secondary to other renal disorders (CODE)    -  1    Kidney replaced by transplant        Aftercare following organ transplant        Anemia in chronic renal disease        Immunosuppressed status (H)        Hypertension secondary to other renal disorders        Secondary renal hyperparathyroidism (H)        Vitamin D deficiency        Hypophosphatemia           Follow-ups after your visit        Follow-up notes from your care team     Return in about 3 months (around 9/8/2017).      Your next 10 appointments already scheduled     Sep 12, 2017 12:00 PM CDT   (Arrive by 11:30 AM)   Return Kidney Transplant with Bill Tejeda MD   [facility] ([facility])    [address] [place] [id number] [phone]              Who to contact     If you have questions or need follow up information about today's clinic visit or your schedule please contact [facility] directly at [phone].  Normal or non-critical lab and imaging results will be communicated to you by MyChart, letter or phone within 4 business days after the clinic has received the results. If you do not hear from us within 7 days, please contact the clinic through [employer] or phone. If you have a critical or abnormal lab result, we will notify you by phone as soon as possible.  Submit refill requests through [employer] or call your pharmacy and they will forward the refill request to us. Please allow 3 business days for your refill to be completed.          Additional Information About Your Visit        [employer] Information     [employer] gives you secure access to your electronic health  "record. If you see a primary care provider, you can also send messages to your care team and make appointments. If you have questions, please call your primary care clinic.  If you do not have a primary care provider, please call [phone] and they will assist you.        Care EveryWhere ID     This is your Care EveryWhere ID. This could be used by other organizations to access your [facility] medical records  [id number]        Your Vitals Were     Pulse Temperature Height Pulse Oximetry BMI (Body Mass Index)       74 98.8  F (37.1  C) (Oral) 1.575 m (5' 2\") 99% 34.68 kg/m2        Blood Pressure from Last 3 Encounters:   06/08/17 143/84   03/30/17 119/74   03/07/17 134/82    Weight from Last 3 Encounters:   06/08/17 86 kg (189 lb 9.6 oz)   03/30/17 84.2 kg (185 lb 9.6 oz)   02/14/17 88.8 kg (195 lb 12.8 oz)              Today, you had the following     No orders found for display         Today's Medication Changes          These changes are accurate as of: 6/8/17 11:59 PM.  If you have any questions, ask your nurse or doctor.               Start taking these medicines.        Dose/Directions    losartan 25 MG tablet   Commonly known as:  COZAAR   Used for:  Hypertension secondary to other renal disorders (CODE)   Started by:  Bill Tejeda MD        Dose:  25 mg   Take 1 tablet (25 mg) by mouth At Bedtime   Quantity:  90 tablet   Refills:  3         Stop taking these medicines if you haven't already. Please contact your care team if you have questions.     MIRALAX powder   Generic drug:  polyethylene glycol   Stopped by:  Bill Tejeda MD           ondansetron 4 MG ODT tab   Commonly known as:  ZOFRAN-ODT   Stopped by:  Bill Tejeda MD           senna-docusate 8.6-50 MG per tablet   Commonly known as:  SENOKOT-S;PERICOLACE   Stopped by:  Bill Tejeda MD                Where to get your medicines      These medications were sent to [facility] [address] [place] [address] " [address] [address]     Phone:  [phone]     losartan 25 MG tablet                Primary Care Provider Office Phone # Fax #    Harry Laboy [phone]       [facility] [address] [place] [address]        Thank you!     Thank you for choosing [facility]  for your care. Our goal is always to provide you with excellent care. Hearing back from our patients is one way we can continue to improve our services. Please take a few minutes to complete the written survey that you may receive in the mail after your visit with us. Thank you!             Your Updated Medication List - Protect others around you: Learn how to safely use, store and throw away your medicines at www.disposemymeds.org.          This list is accurate as of: 6/8/17 11:59 PM.  Always use your most recent med list.                   Brand Name Dispense Instructions for use    amLODIPine 10 MG tablet    NORVASC    30 tablet    Take 1 tablet (10 mg) by mouth daily       calcitRIOL 0.25 MCG capsule    ROCALTROL    90 capsule    Take 1 capsule (0.25 mcg) by mouth daily for 28 days       cholecalciferol 1000 UNIT tablet    vitamin D    60 tablet    Take 2 tablets (2,000 Units) by mouth daily       isoniazid 300 MG tablet    NYDRAZID    30 tablet    Take 1 tablet (300 mg) by mouth daily Take for 9 months total. You should have your liver function tests drawn monthly while on this medication.       losartan 25 MG tablet    COZAAR    90 tablet    Take 1 tablet (25 mg) by mouth At Bedtime       magnesium oxide 400 MG tablet    MAG-OX    120 tablet    Take 2 tablets (800 mg) by mouth 2 times daily       metoprolol 50 MG tablet    LOPRESSOR    60 tablet    Take 1 tablet (50 mg) by mouth 2 times daily       mycophenolate 250 MG capsule    CELLCEPT - GENERIC EQUIVALENT    180 capsule    Take 3 capsules (750 mg) by mouth 2 times daily       omeprazole 20 MG CR capsule    priLOSEC     30 capsule    Take 1 capsule (20 mg) by mouth daily       phosphorus tablet 250 mg 250 MG per tablet    K PHOS NEUTRAL    120 tablet    Take 2 tablets (500 mg) by mouth 2 times daily       pyridOXINE 25 MG tablet    vitamin B-6    30 tablet    Take 1 tablet (25 mg) by mouth daily       sulfamethoxazole-trimethoprim 400-80 MG per tablet    BACTRIM/SEPTRA    12 tablet    Take 1 tablet by mouth three times a week Take on Monday, Wednesday, and Saturday. The dose of this medication may need to be changed as your kidney function improves (ask your Nephrologist).       * tacrolimus 0.5 MG capsule    PROGRAF - GENERIC EQUIVALENT    60 capsule    Take 1 capsule (0.5 mg) by mouth 2 times daily Total dose = 2.5 mg BID       * tacrolimus 1 MG capsule    PROGRAF - GENERIC EQUIVALENT    120 capsule    Take 2 capsules (2 mg) by mouth 2 times daily Total dose = 2.5 mg BID       valGANciclovir 450 MG tablet    VALCYTE    60 tablet    Take 2 tablets (900 mg) by mouth daily You should take this medication for 6 months total after your transplant.       * Notice:  This list has 2 medication(s) that are the same as other medications prescribed for you. Read the directions carefully, and ask your doctor or other care provider to review them with you.

## 2017-06-08 NOTE — NURSING NOTE
"Chief Complaint   Patient presents with     RECHECK     Kidney folllow up       Initial /84 (BP Location: Right arm, Patient Position: Chair, Cuff Size: Adult Regular)  Pulse 74  Temp 98.8  F (37.1  C) (Oral)  Ht 1.575 m (5' 2\")  Wt 86 kg (189 lb 9.6 oz)  SpO2 99%  BMI 34.68 kg/m2 Estimated body mass index is 34.68 kg/(m^2) as calculated from the following:    Height as of this encounter: 1.575 m (5' 2\").    Weight as of this encounter: 86 kg (189 lb 9.6 oz).  Medication Reconciliation: complete   BURTON DUNHAM CMA      "

## 2017-06-12 NOTE — PROGRESS NOTES
Assessment and Plan:  1. DDKT - baseline Cr ~ 1.1-1.3, which has remained stable lately and improved from initial baseline closer to 1.3-1.5 range.  No DSA.  Will make no changes in immunosuppression.  2. HTN - fair control right at target of less than 140/90.  Will start losartan 25 mg qHS.  3. Anemia in chronic renal disease - improved Hgb, now normal.  Continue on oral iron.  Will follow.  4. Secondary renal hyperparathyroidism - mildly elevated PTH, which is improved post transplant.  Will continue on calcitriol.  Will repeat PTH at next visit.  5. Vitamin D deficiency - improved, low level vitamin D level and will continue on cholecalciferol.  6. Hypophosphatemia - previously low serum phosphorus level and will continue on oral phosphorus supplement.  Will recheck phosphorus with next labs.  Discussed increased dietary phosphorus.  7. Hypomagnesemia - slightly low serum magnesium level and will continue on oral magnesium supplement.  8. GERD - improved symptoms and continue on PPI.  9. Latent TB - patient to continue INH and vitamin B6 until November 2017.  10. Abnormal LFTs - mildly elevated LFTs, likely secondary to INH and will need to follow closely.  11. Overweight - recommend increased exercise as able and watch caloric intake.  12. CMV IgG Ab discordant - will continue on Valcyte until 6 months post transplant, then check qmonth CMV PCR until 12 months post transplant.  13. Recommend return visit in 3 months.    Assessment and plan was discussed with patient and she voiced her understanding and agreement.    Reason for Visit:  Ms. Romero is here for routine follow up.    HPI:   Angelica Romero is a 51 year old female with ESKD from unclear etiology and is status post DDKT on 2/8/17.         Transplant Hx:       Tx: DDKT  Date: 2/8/17       Present Maintenance IS: Tacrolimus and Mycophenolate mofetil       Baseline Creatinine: 1.1-1.3       Recent DSA: No  Date last checked: 2/2017       Biopsy: No    Ms.  "Nick reports feeling good overall with some medical complaints.  Her energy level has been \"amazing\" and now back to normal.  She is active, although only gets a little exercise.  Denies any chest pain or shortness of breath with exertion.  Appetite is \"too good\" and she has gained about 5 lbs.  Rare nausea and vomiting.  No diarrhea.  No fever, sweats or chills.  No leg swelling.  No pain or burning with urination.    Home BP: 130-140/80s.      ROS:   A comprehensive review of systems was obtained and negative, except as noted in the HPI or PMH.    Active Medical Problems:  Patient Active Problem List   Diagnosis     Hypertension secondary to other renal disorders     Obesity, unspecified     Kidney replaced by transplant     Immunosuppressed status (H)     Aftercare following organ transplant     Anemia in chronic renal disease     Secondary renal hyperparathyroidism (H)     Vitamin D deficiency     Gastroesophageal reflux disease without esophagitis     Hypophosphatemia     Latent tuberculosis       Personal Hx:  Social History     Social History     Marital status:      Spouse name: N/A     Number of children: N/A     Years of education: N/A     Occupational History     Not on file.     Social History Main Topics     Smoking status: Former Smoker     Packs/day: 0.20     Years: 15.00     Types: Cigarettes     Quit date: 1/1/2016     Smokeless tobacco: Never Used     Alcohol use 1.8 oz/week     3 Standard drinks or equivalent per week     Drug use: No     Sexual activity: Not on file     Other Topics Concern     Not on file     Social History Narrative       Allergies:  No Known Allergies    Medications:  Prior to Admission medications    Medication Sig Start Date End Date Taking? Authorizing Provider   phosphorus tablet 250 mg (K PHOS NEUTRAL) 250 MG per tablet Take 2 tablets (500 mg) by mouth 2 times daily 3/30/17  Yes Bill Tejeda MD   omeprazole (PRILOSEC) 20 MG CR capsule Take 1 capsule (20 " mg) by mouth daily 3/30/17  Yes Bill Tejeda MD   magnesium oxide (MAG-OX) 400 MG tablet Take 2 tablets (800 mg) by mouth 2 times daily 3/7/17  Yes Jose Naranjo MD   valGANciclovir (VALCYTE) 450 MG tablet Take 1 tablet (450 mg) by mouth daily You should take this medication for 6 months total after your transplant. 2/23/17  Yes Bill Tejeda MD   isoniazid (NYDRAZID) 300 MG tablet Take 1 tablet (300 mg) by mouth daily Take for 9 months total. You should have your liver function tests drawn monthly while on this medication. 2/17/17  Yes Thony Montes MD   mycophenolate (CELLCEPT - GENERIC EQUIVALENT) 250 MG capsule Take 3 capsules (750 mg) by mouth 2 times daily 2/17/17  Yes Thony Montes MD   sulfamethoxazole-trimethoprim (BACTRIM/SEPTRA) 400-80 MG per tablet Take 1 tablet by mouth three times a week Take on Monday, Wednesday, and Saturday. The dose of this medication may need to be changed as your kidney function improves (ask your Nephrologist). 2/18/17  Yes Thony Montes MD   tacrolimus (PROGRAF - GENERIC EQUIVALENT) 0.5 MG capsule Take 1 capsule (0.5 mg) by mouth 2 times daily Total dose = 3.5 mg BID 2/17/17  Yes Thony Montes MD   tacrolimus (PROGRAF - GENERIC EQUIVALENT) 1 MG capsule Take 3 capsules (3 mg) by mouth 2 times daily Total dose = 3.5 mg BID 2/17/17  Yes Thony Montes MD   ondansetron (ZOFRAN-ODT) 4 MG ODT tab Take 1 tablet (4 mg) by mouth every 6 hours as needed for nausea 2/11/17  Yes Felisha Coy MD   amLODIPine (NORVASC) 10 MG tablet Take 1 tablet (10 mg) by mouth daily 2/11/17  Yes Felisha Coy MD   senna-docusate (SENOKOT-S;PERICOLACE) 8.6-50 MG per tablet Take 1-2 tablets by mouth 2 times daily as needed for constipation 2/11/17  Yes Felisha Coy MD   pyridOXINE (VITAMIN B-6) 25 MG tablet Take 1 tablet (25 mg) by mouth daily 2/11/17  Yes Felisha Coy MD   metoprolol (LOPRESSOR) 50 MG tablet Take 1 tablet (50 mg) by mouth 2  "times daily 2/11/17  Yes Felisha Coy MD   polyethylene glycol (MIRALAX) powder Take 17 g by mouth daily as needed.   Yes Reported, Patient       Vitals:  /84 (BP Location: Right arm, Patient Position: Chair, Cuff Size: Adult Regular)  Pulse 74  Temp 98.8  F (37.1  C) (Oral)  Ht 1.575 m (5' 2\")  Wt 86 kg (189 lb 9.6 oz)  SpO2 99%  BMI 34.68 kg/m2    Exam:   GENERAL APPEARANCE: alert and no distress  HENT: mouth without ulcers or lesions  LYMPHATICS: no cervical or supraclavicular nodes  RESP: lungs clear to auscultation - no rales, rhonchi or wheezes  CV: regular rhythm, normal rate, no rub, no murmur  EDEMA: trace LE edema bilaterally  ABDOMEN: soft, nondistended, nontender, bowel sounds normal, overweight  MS: extremities normal - no gross deformities noted, no evidence of inflammation in joints, no muscle tenderness; left upper extremity AV fistula with good thrill  SKIN: no rash  TX KIDNEY: normal    Results:   Recent Results (from the past 168 hour(s))   Tacrolimus level    Collection Time: 06/06/17 10:26 AM   Result Value Ref Range    Tacrolimus Last Dose [id number]     Tacrolimus Level 9.4 5.0 - 15.0 ug/L   TXP External Lab Result    Collection Time: 06/06/17 10:29 AM   Result Value Ref Range    WBC Count (External) 4.3 4.1 - 10.9 x10^3/uL    RBC Count (External) 4.39 3.85 - 5.05 x10^6/uL    Hemoglobin (External) 13.7 11.7 - 15.5 g/dL    Hematocrit (External) 39.7 35.0 - 46.0 %    MCV (External) 90.4 82.0 - 99.0 fL    MCH (External) 31.2 27.0 - 34.0 pg    MCHC (External) 34.6 32.0 - 36.0 g/dL    RDW (External) 14.9 11.5 - 15.0 %    Platelet Count (External) 289 150 - 450 x10^3/uL    MPV (External) 7.3 6.0 - 9.5 fL    Sodium (External) 142 133 - 144 mmol/L    Potassium (External) 3.7 3.4 - 5.1 mmol/L    Chloride (External) 108 96 - 109 mmol/L    CO2 (External) 25 21 - 33 mmol/L    Anion Gap (External) 9 2 - 16 mmol/L    Urea Nitrogen (External) 13 6 - 24 mg/dL    Creatinine (External) 1.1 " (H) 0.4 - 1.0 mg/dL    Glucose (External) 108 (H) 70 - 99 mg/dL    Calcium (External) 9.2 8.2 - 10.0 mg/dL    GFR Estimated (External) 52.2 (L) 60.0 - 150.0 mL/min    Albumin (External) 3.8 3.3 - 5.0 g/dL    Bilirubin Total (External) 0.7 0.4 - 1.4 mg/dL    Bilirubin Direct (External) 0.2 0.0 - 0.3 mg/dL    AST (External) 45 (H) 14 - 41 U/L    Alk Phosphatase (External) 85 45 - 115 IU/L    ALT (External) 85 (H) 7 - 55 U/L    Protein Total (External) 7.0 6.2 - 8.2 g/dL

## 2017-06-13 DIAGNOSIS — Z94.0 KIDNEY REPLACED BY TRANSPLANT: ICD-10-CM

## 2017-06-13 DIAGNOSIS — Z48.298 AFTERCARE FOLLOWING ORGAN TRANSPLANT: ICD-10-CM

## 2017-06-13 PROCEDURE — 80197 ASSAY OF TACROLIMUS: CPT | Performed by: INTERNAL MEDICINE

## 2017-06-16 LAB
TACROLIMUS BLD-MCNC: 9.1 UG/L (ref 5–15)
TME LAST DOSE: NORMAL H

## 2017-06-22 ENCOUNTER — TELEPHONE (OUTPATIENT)
Dept: TRANSPLANT | Facility: CLINIC | Age: 52
End: 2017-06-22

## 2017-06-22 DIAGNOSIS — Z48.298 AFTERCARE FOLLOWING ORGAN TRANSPLANT: ICD-10-CM

## 2017-06-22 DIAGNOSIS — Z94.0 KIDNEY REPLACED BY TRANSPLANT: ICD-10-CM

## 2017-06-22 PROCEDURE — 80197 ASSAY OF TACROLIMUS: CPT | Performed by: INTERNAL MEDICINE

## 2017-06-22 NOTE — TELEPHONE ENCOUNTER
Over due for BK PCR QT  And Donor Specific Antibodies     Please call fax and send letter to obtain these labs with the next lab draw    Thanks

## 2017-06-22 NOTE — TELEPHONE ENCOUNTER
Updated current standing lab orders.  Mailed copy to patient and faxed copy to lab.  Left message for patient to have BK and DSA completed at the next routine transplant lab draw.

## 2017-06-22 NOTE — LETTER
OUTPATIENT LABORATORY TEST ORDER    Patient Name:Angelica Romero   Transplant Date: 2/8/2017  YOB: 1965  Issue Date & Time: 6/22/2017  1:34 PM  [facility] MR: [id number]  Expiration Date:  (1 year after date issued)      Diagnoses: Aftercare following organ transplant (ICD-10  Z48.288)   Kidney Transplant (ICD-10  Z94.0)   Long term use of medications (ICD-10  Z79.899)     ?Lab results to be available on the same day drawn.   ?Patient should release information to the [facility].  ?Please fax to the Transplant Center at [phone].    1x/week, Months 4-6 post-transplant    5/10/2017 - 8/10/2017  Every 2 weeks, Months 7-9 post-transplant    8/11/2017 - 11/11/2017  Every 3 weeks, Months 10-12 post-transplant   11/12/2017 - 2/8/2018       ?Hemogram and Platelet   ?Basic Metabolic Panel (Sodium, Potassium,Chloride, CO2, Creatinine, Urea Nitrogen, Glucose, Calcium)   ?Prograf/Tacrolimus drug level     Monthly:  DUE NOW   ? BK PCR Quantitative (Polyoma virus - blood in purple top tube to Reference Lab)   ? CMV PCR QT   ? LFT'S    At 6 & 12 months post-transplant         8/2017 & 2/2018   ? HBsurfaceAb, HBcoreAb, HCV at 6 months only -   ? Liver Function Tests(Bilirubin Direct/Total, AST, ALT, Alkaline Phosphatase)   ?Complete Lipid Panel fasting (Cholesterol, Triglycerides, HDL, LDL)   ? Random Urine for Protein/Creatinineratio    At month(s) 4, 6, 9, 12       6/2017, 8/2017, 11/2017, 2/2018  Due: NOW                  ? PRA/DSA level (mailers provided by the patient)    If you have any questions, please call [facility] at [phone] or [phone].    Please faxall results to [phone].        Jose Naranjo MD  Department of Surgery

## 2017-06-26 LAB
TACROLIMUS BLD-MCNC: 14.1 UG/L (ref 5–15)
TME LAST DOSE: NORMAL H

## 2017-06-27 DIAGNOSIS — Z94.0 KIDNEY REPLACED BY TRANSPLANT: Primary | ICD-10-CM

## 2017-06-27 DIAGNOSIS — Z48.298 AFTERCARE FOLLOWING ORGAN TRANSPLANT: ICD-10-CM

## 2017-06-27 DIAGNOSIS — Z22.7 LATENT TUBERCULOSIS BY BLOOD TEST: ICD-10-CM

## 2017-06-27 DIAGNOSIS — Z94.0 DECEASED-DONOR KIDNEY TRANSPLANT: ICD-10-CM

## 2017-06-27 DIAGNOSIS — Z94.0 KIDNEY REPLACED BY TRANSPLANT: ICD-10-CM

## 2017-06-27 PROCEDURE — 80197 ASSAY OF TACROLIMUS: CPT | Performed by: INTERNAL MEDICINE

## 2017-06-27 NOTE — TELEPHONE ENCOUNTER
ISSUE:  Tac level 14.1, s/b 8-10  Previously therapeutic on 2.5 mg BID, no dose changes.     PLAN:   Please call pt and confirm 12-hour trough, dose, no new medications or illness (wesley. Diarrhea). If good trough, decrease dose to 1.5 mg BID - recheck level this week

## 2017-06-28 RX ORDER — TACROLIMUS 0.5 MG/1
0.5 CAPSULE ORAL 2 TIMES DAILY
Qty: 60 CAPSULE | Refills: 11 | Status: SHIPPED | OUTPATIENT
Start: 2017-06-28 | End: 2018-02-12

## 2017-06-28 RX ORDER — TACROLIMUS 1 MG/1
1 CAPSULE ORAL 2 TIMES DAILY
Qty: 60 CAPSULE | Refills: 11 | Status: SHIPPED | OUTPATIENT
Start: 2017-06-28 | End: 2018-02-12

## 2017-06-28 NOTE — TELEPHONE ENCOUNTER
Spoke to patient regarding tac level = 14.1, above goal.  Patient confirms current dose of 2.5 mg BID and good 12 hour trough level.  Patient denies any new or recent illness, no new medications.  Patient verbalizes understanding of medication dose decrease to 1.5 mg BID.

## 2017-06-29 LAB
TACROLIMUS BLD-MCNC: 11.1 UG/L (ref 5–15)
TME LAST DOSE: NORMAL H

## 2017-07-11 DIAGNOSIS — Z94.0 KIDNEY REPLACED BY TRANSPLANT: ICD-10-CM

## 2017-07-11 DIAGNOSIS — Z48.298 AFTERCARE FOLLOWING ORGAN TRANSPLANT: ICD-10-CM

## 2017-07-11 PROCEDURE — 80197 ASSAY OF TACROLIMUS: CPT | Performed by: INTERNAL MEDICINE

## 2017-07-14 LAB
TACROLIMUS BLD-MCNC: 8.6 UG/L (ref 5–15)
TME LAST DOSE: NORMAL H

## 2017-07-18 DIAGNOSIS — Z48.298 AFTERCARE FOLLOWING ORGAN TRANSPLANT: ICD-10-CM

## 2017-07-18 DIAGNOSIS — Z94.0 KIDNEY REPLACED BY TRANSPLANT: ICD-10-CM

## 2017-07-18 PROCEDURE — 80197 ASSAY OF TACROLIMUS: CPT | Performed by: INTERNAL MEDICINE

## 2017-07-20 LAB
TACROLIMUS BLD-MCNC: 7.3 UG/L (ref 5–15)
TME LAST DOSE: NORMAL H

## 2017-07-25 DIAGNOSIS — Z48.298 AFTERCARE FOLLOWING ORGAN TRANSPLANT: ICD-10-CM

## 2017-07-25 DIAGNOSIS — Z94.0 KIDNEY REPLACED BY TRANSPLANT: ICD-10-CM

## 2017-07-25 PROCEDURE — 80197 ASSAY OF TACROLIMUS: CPT | Performed by: INTERNAL MEDICINE

## 2017-07-28 LAB
TACROLIMUS BLD-MCNC: 7.3 UG/L (ref 5–15)
TME LAST DOSE: NORMAL H

## 2017-08-01 ENCOUNTER — RESULTS ONLY (OUTPATIENT)
Dept: OTHER | Facility: CLINIC | Age: 52
End: 2017-08-01

## 2017-08-01 DIAGNOSIS — Z94.0 KIDNEY REPLACED BY TRANSPLANT: ICD-10-CM

## 2017-08-01 DIAGNOSIS — Z48.298 AFTERCARE FOLLOWING ORGAN TRANSPLANT: ICD-10-CM

## 2017-08-01 PROCEDURE — 86833 HLA CLASS II HIGH DEFIN QUAL: CPT | Performed by: TRANSPLANT SURGERY

## 2017-08-01 PROCEDURE — 80197 ASSAY OF TACROLIMUS: CPT | Performed by: INTERNAL MEDICINE

## 2017-08-01 PROCEDURE — 86832 HLA CLASS I HIGH DEFIN QUAL: CPT | Performed by: TRANSPLANT SURGERY

## 2017-08-03 ENCOUNTER — TELEPHONE (OUTPATIENT)
Dept: TRANSPLANT | Facility: CLINIC | Age: 52
End: 2017-08-03

## 2017-08-03 NOTE — TELEPHONE ENCOUNTER
Updated lab orders.  Faxed to [employer] lab and mailed to patient along with [facility] lab kit mailers.

## 2017-08-03 NOTE — LETTER
PHYSICIAN ORDERS      DATE & TIME ISSUED: August 3, 2017 4:07 PM  PATIENT NAME: Angelica Romero   : 1965     [facility] MR#  [id number]     DIAGNOSIS:  Kidney Transplant  ICD-10 CODE: Z94.0     Patient is overdue for the following labs, please draw week of 2017:  BK PCR QT  PRA/DSA Donor Specific Antibodies    Any questions please call: [phone]    Please fax these results to [phone].    .

## 2017-08-03 NOTE — TELEPHONE ENCOUNTER
Issue  Over Due for BK PCR QT and Donor Specific Antibodies   Despite calling this week to obtain from local lab /Lab orders correct      Plan:   Please call and send lab orders for one time orders to obtain BK PCR QT and Donor Specific Antibodies    Please call lab regarding the urgency to obtain these

## 2017-08-04 LAB
TACROLIMUS BLD-MCNC: 7.8 UG/L (ref 5–15)
TME LAST DOSE: NORMAL H

## 2017-08-07 LAB — PRA DONOR SPECIFIC ABY: NORMAL

## 2017-08-08 DIAGNOSIS — Z94.0 KIDNEY REPLACED BY TRANSPLANT: ICD-10-CM

## 2017-08-08 DIAGNOSIS — Z48.298 AFTERCARE FOLLOWING ORGAN TRANSPLANT: ICD-10-CM

## 2017-08-08 PROCEDURE — 80197 ASSAY OF TACROLIMUS: CPT | Performed by: INTERNAL MEDICINE

## 2017-08-09 ENCOUNTER — TELEPHONE (OUTPATIENT)
Dept: PHARMACY | Facility: CLINIC | Age: 52
End: 2017-08-09

## 2017-08-10 LAB
TACROLIMUS BLD-MCNC: 6.4 UG/L (ref 5–15)
TME LAST DOSE: NORMAL H

## 2017-08-16 DIAGNOSIS — K21.9 GASTROESOPHAGEAL REFLUX DISEASE WITHOUT ESOPHAGITIS: ICD-10-CM

## 2017-08-17 ENCOUNTER — RESULTS ONLY (OUTPATIENT)
Dept: TRANSPLANT | Facility: CLINIC | Age: 52
End: 2017-08-17

## 2017-08-17 LAB
DONOR IDENTIFICATION: NORMAL
DSA COMMENTS: NORMAL
DSA PRESENT: NO
DSA TEST METHOD: NORMAL
ORGAN: NORMAL
SA1 CELL: NORMAL
SA1 COMMENTS: NORMAL
SA1 HI RISK ABY: NORMAL
SA1 MOD RISK ABY: NORMAL
SA1 TEST METHOD: NORMAL
SA2 CELL: NORMAL
SA2 COMMENTS: NORMAL
SA2 HI RISK ABY UA: NORMAL
SA2 MOD RISK ABY: NORMAL
SA2 TEST METHOD: NORMAL

## 2017-08-22 DIAGNOSIS — Z48.298 AFTERCARE FOLLOWING ORGAN TRANSPLANT: ICD-10-CM

## 2017-08-22 DIAGNOSIS — Z94.0 KIDNEY REPLACED BY TRANSPLANT: ICD-10-CM

## 2017-08-22 PROCEDURE — 80197 ASSAY OF TACROLIMUS: CPT | Performed by: INTERNAL MEDICINE

## 2017-08-24 LAB
TACROLIMUS BLD-MCNC: 6.3 UG/L (ref 5–15)
TME LAST DOSE: NORMAL H

## 2017-09-05 DIAGNOSIS — Z48.298 AFTERCARE FOLLOWING ORGAN TRANSPLANT: ICD-10-CM

## 2017-09-05 DIAGNOSIS — Z94.0 KIDNEY REPLACED BY TRANSPLANT: ICD-10-CM

## 2017-09-05 PROCEDURE — 80197 ASSAY OF TACROLIMUS: CPT | Performed by: INTERNAL MEDICINE

## 2017-09-08 LAB
TACROLIMUS BLD-MCNC: 8.8 UG/L (ref 5–15)
TME LAST DOSE: NORMAL H

## 2017-09-12 ENCOUNTER — OFFICE VISIT (OUTPATIENT)
Dept: NEPHROLOGY | Facility: CLINIC | Age: 52
End: 2017-09-12
Attending: INTERNAL MEDICINE
Payer: MEDICARE

## 2017-09-12 VITALS
SYSTOLIC BLOOD PRESSURE: 126 MMHG | OXYGEN SATURATION: 95 % | HEIGHT: 62 IN | DIASTOLIC BLOOD PRESSURE: 79 MMHG | BODY MASS INDEX: 36.88 KG/M2 | WEIGHT: 200.4 LBS | HEART RATE: 77 BPM

## 2017-09-12 DIAGNOSIS — Z94.0 DECEASED-DONOR KIDNEY TRANSPLANT: ICD-10-CM

## 2017-09-12 DIAGNOSIS — E83.42 HYPOMAGNESEMIA: ICD-10-CM

## 2017-09-12 DIAGNOSIS — E83.39 HYPOPHOSPHATEMIA: ICD-10-CM

## 2017-09-12 DIAGNOSIS — E55.9 VITAMIN D DEFICIENCY: ICD-10-CM

## 2017-09-12 DIAGNOSIS — N25.81 SECONDARY RENAL HYPERPARATHYROIDISM (H): ICD-10-CM

## 2017-09-12 DIAGNOSIS — Z94.0 KIDNEY REPLACED BY TRANSPLANT: Primary | ICD-10-CM

## 2017-09-12 DIAGNOSIS — Z22.7 LATENT TUBERCULOSIS BY BLOOD TEST: ICD-10-CM

## 2017-09-12 DIAGNOSIS — D84.9 IMMUNOSUPPRESSED STATUS (H): ICD-10-CM

## 2017-09-12 DIAGNOSIS — Z22.7 LATENT TUBERCULOSIS: ICD-10-CM

## 2017-09-12 DIAGNOSIS — Z94.0 KIDNEY REPLACED BY TRANSPLANT: ICD-10-CM

## 2017-09-12 DIAGNOSIS — I15.1 HYPERTENSION SECONDARY TO OTHER RENAL DISORDERS: ICD-10-CM

## 2017-09-12 DIAGNOSIS — Z48.298 AFTERCARE FOLLOWING ORGAN TRANSPLANT: ICD-10-CM

## 2017-09-12 PROBLEM — N18.9 ANEMIA IN CHRONIC RENAL DISEASE: Status: RESOLVED | Noted: 2017-03-30 | Resolved: 2017-09-12

## 2017-09-12 PROBLEM — D63.1 ANEMIA IN CHRONIC RENAL DISEASE: Status: RESOLVED | Noted: 2017-03-30 | Resolved: 2017-09-12

## 2017-09-12 LAB
ALBUMIN SERPL-MCNC: 3.6 G/DL (ref 3.4–5)
ANION GAP SERPL CALCULATED.3IONS-SCNC: 7 MMOL/L (ref 3–14)
BUN SERPL-MCNC: 13 MG/DL (ref 7–30)
CALCIUM SERPL-MCNC: 9.4 MG/DL (ref 8.5–10.1)
CHLORIDE SERPL-SCNC: 106 MMOL/L (ref 94–109)
CO2 SERPL-SCNC: 25 MMOL/L (ref 20–32)
CREAT SERPL-MCNC: 1.09 MG/DL (ref 0.52–1.04)
ERYTHROCYTE [DISTWIDTH] IN BLOOD BY AUTOMATED COUNT: 13.1 % (ref 10–15)
GFR SERPL CREATININE-BSD FRML MDRD: 53 ML/MIN/1.7M2
GLUCOSE SERPL-MCNC: 126 MG/DL (ref 70–99)
HCT VFR BLD AUTO: 41.2 % (ref 35–47)
HGB BLD-MCNC: 13.8 G/DL (ref 11.7–15.7)
MAGNESIUM SERPL-MCNC: 1.5 MG/DL (ref 1.6–2.3)
MCH RBC QN AUTO: 31.2 PG (ref 26.5–33)
MCHC RBC AUTO-ENTMCNC: 33.5 G/DL (ref 31.5–36.5)
MCV RBC AUTO: 93 FL (ref 78–100)
PHOSPHATE SERPL-MCNC: 1.9 MG/DL (ref 2.5–4.5)
PLATELET # BLD AUTO: 287 10E9/L (ref 150–450)
POTASSIUM SERPL-SCNC: 3.8 MMOL/L (ref 3.4–5.3)
PTH-INTACT SERPL-MCNC: 210 PG/ML (ref 12–72)
RBC # BLD AUTO: 4.42 10E12/L (ref 3.8–5.2)
SODIUM SERPL-SCNC: 138 MMOL/L (ref 133–144)
WBC # BLD AUTO: 6.3 10E9/L (ref 4–11)

## 2017-09-12 PROCEDURE — 80069 RENAL FUNCTION PANEL: CPT | Performed by: INTERNAL MEDICINE

## 2017-09-12 PROCEDURE — 83970 ASSAY OF PARATHORMONE: CPT | Performed by: INTERNAL MEDICINE

## 2017-09-12 PROCEDURE — 85027 COMPLETE CBC AUTOMATED: CPT | Performed by: INTERNAL MEDICINE

## 2017-09-12 PROCEDURE — 87799 DETECT AGENT NOS DNA QUANT: CPT | Performed by: INTERNAL MEDICINE

## 2017-09-12 PROCEDURE — 99212 OFFICE O/P EST SF 10 MIN: CPT | Mod: ZF

## 2017-09-12 PROCEDURE — 36415 COLL VENOUS BLD VENIPUNCTURE: CPT | Performed by: INTERNAL MEDICINE

## 2017-09-12 PROCEDURE — 83735 ASSAY OF MAGNESIUM: CPT | Performed by: INTERNAL MEDICINE

## 2017-09-12 ASSESSMENT — PAIN SCALES - GENERAL: PAINLEVEL: NO PAIN (0)

## 2017-09-12 NOTE — MR AVS SNAPSHOT
After Visit Summary   9/12/2017    Angelica Romero    MRN: [id number]           Patient Information     Date Of Birth          1965        Visit Information        Provider Department      9/12/2017 12:00 PM Bill Tejeda MD [facility]        Today's Diagnoses     Kidney replaced by transplant    -  1    Secondary renal hyperparathyroidism (H)        Hypophosphatemia        Immunosuppressed status (H)        Hypomagnesemia        Aftercare following organ transplant        Hypertension secondary to other renal disorders        Vitamin D deficiency        Latent tuberculosis           Follow-ups after your visit        Follow-up notes from your care team     Return in about 3 months (around 12/12/2017).      Your next 10 appointments already scheduled     Sep 12, 2017  1:00 PM CDT   Lab with  LAB   [facility] ([facility])    [address] [place] [id number] [phone]            Dec 18, 2017  1:05 PM CST   (Arrive by 12:35 PM)   Return Kidney Transplant with Bill Tejeda MD   [facility] ([facility])    [address] [place] [id number] [phone]              Future tests that were ordered for you today     Open Future Orders        Priority Expected Expires Ordered    Renal panel Routine  10/12/2017 9/12/2017    Parathyroid Hormone Intact Routine  10/12/2017 9/12/2017    Magnesium Routine  10/12/2017 9/12/2017            Who to contact     If you have questions or need follow up information about today's clinic visit or your schedule please contact [facility] directly at [phone].  Normal or non-critical lab and imaging results will be communicated to you by MyChart, letter or phone within 4 business days after the clinic has received the results. If you do not hear from us within 7 days, please contact the clinic through MyChart or phone.  "If you have a critical or abnormal lab result, we will notify you by phone as soon as possible.  Submit refill requests through [employer] or call your pharmacy and they will forward the refill request to us. Please allow 3 business days for your refill to be completed.          Additional Information About Your Visit        [employer] Information     [employer] gives you secure access to your electronic health record. If you see a primary care provider, you can also send messages to your care team and make appointments. If you have questions, please call your primary care clinic.  If you do not have a primary care provider, please call [phone] and they will assist you.        Care EveryWhere ID     This is your Care EveryWhere ID. This could be used by other organizations to access your [facility] medical records  [id number]        Your Vitals Were     Pulse Height Pulse Oximetry BMI (Body Mass Index)          77 1.575 m (5' 2\") 95% 36.65 kg/m2         Blood Pressure from Last 3 Encounters:   09/12/17 126/79   06/08/17 143/84   03/30/17 119/74    Weight from Last 3 Encounters:   09/12/17 90.9 kg (200 lb 6.4 oz)   06/08/17 86 kg (189 lb 9.6 oz)   03/30/17 84.2 kg (185 lb 9.6 oz)                 Today's Medication Changes          These changes are accurate as of: 9/12/17 12:26 PM.  If you have any questions, ask your nurse or doctor.               Stop taking these medicines if you haven't already. Please contact your care team if you have questions.     valGANciclovir 450 MG tablet   Commonly known as:  VALCYTE   Stopped by:  Bill Tejeda MD                    Primary Care Provider Office Phone # Fax #    Harry Laboy [phone]       [address] [address]        Equal Access to Services     MALVIN MCGEE AH: Denis turnero Amauri, waaxda luqadaha, qaybta kaalmada rolandoyada, hi mcfarlane. So wa " [phone].    ATENCIÓN: Si po hays, tiene a park disposición servicios gratuitos de asistencia lingüística. Marleni sparks [phone].    We comply with applicable federal civil rights laws and [place] laws. We do not discriminate on the basis of race, color, national origin, age, disability sex, sexual orientation or gender identity.            Thank you!     Thank you for choosing [facility]  for your care. Our goal is always to provide you with excellent care. Hearing back from our patients is one way we can continue to improve our services. Please take a few minutes to complete the written survey that you may receive in the mail after your visit with us. Thank you!             Your Updated Medication List - Protect others around you: Learn how to safely use, store and throw away your medicines at www.disposemymeds.org.          This list is accurate as of: 17 12:26 PM.  Always use your most recent med list.                   Brand Name Dispense Instructions for use Diagnosis    amLODIPine 10 MG tablet    NORVASC    30 tablet    Take 1 tablet (10 mg) by mouth daily    Essential hypertension       calcitRIOL 0.25 MCG capsule    ROCALTROL    90 capsule    Take 1 capsule (0.25 mcg) by mouth daily for 28 days    Aftercare following organ transplant       cholecalciferol 1000 UNIT tablet    vitamin D    60 tablet    Take 2 tablets (2,000 Units) by mouth daily    -donor kidney transplant, Latent tuberculosis by blood test, Aftercare following organ transplant, Kidney replaced by transplant       isoniazid 300 MG tablet    NYDRAZID    30 tablet    Take 1 tablet (300 mg) by mouth daily Take for 9 months total. You should have your liver function tests drawn monthly while on this medication.    Latent tuberculosis by blood test, -donor kidney transplant, Aftercare following organ transplant, Kidney replaced by transplant       losartan 25 MG tablet    COZAAR    90 tablet    Take 1 tablet  (25 mg) by mouth At Bedtime    Hypertension secondary to other renal disorders (CODE)       magnesium oxide 400 MG tablet    MAG-OX    120 tablet    Take 2 tablets (800 mg) by mouth 2 times daily    Hypomagnesemia       metoprolol 50 MG tablet    LOPRESSOR    60 tablet    Take 1 tablet (50 mg) by mouth 2 times daily    Essential hypertension       mycophenolate 250 MG capsule    GENERIC EQUIVALENT    180 capsule    Take 3 capsules (750 mg) by mouth 2 times daily    -donor kidney transplant, Latent tuberculosis by blood test, Aftercare following organ transplant, Kidney replaced by transplant       omeprazole 20 MG CR capsule    priLOSEC    30 capsule    TAKE ONE CAPSULE BY MOUTH ONCE DAILY    Gastroesophageal reflux disease without esophagitis       phosphorus tablet 250 mg 250 MG per tablet    K PHOS NEUTRAL    120 tablet    Take 2 tablets (500 mg) by mouth 2 times daily    Hypophosphatemia       pyridOXINE 25 MG tablet    vitamin B-6    30 tablet    Take 1 tablet (25 mg) by mouth daily    Latent tuberculosis by blood test       sulfamethoxazole-trimethoprim 400-80 MG per tablet    BACTRIM/SEPTRA    12 tablet    Take 1 tablet by mouth three times a week Take on Monday, Wednesday, and Saturday. The dose of this medication may need to be changed as your kidney function improves (ask your Nephrologist).    -donor kidney transplant, Latent tuberculosis by blood test, Aftercare following organ transplant, Kidney replaced by transplant       * tacrolimus 0.5 MG capsule    GENERIC EQUIVALENT    60 capsule    Take 1 capsule (0.5 mg) by mouth 2 times daily Total dose = 1.5 mg BID    Aftercare following organ transplant, Kidney replaced by transplant, Latent tuberculosis by blood test, -donor kidney transplant       * tacrolimus 1 MG capsule    GENERIC EQUIVALENT    60 capsule    Take 1 capsule (1 mg) by mouth 2 times daily Total dose = 1.5 mg BID    -donor kidney transplant, Latent  tuberculosis by blood test, Aftercare following organ transplant, Kidney replaced by transplant       * Notice:  This list has 2 medication(s) that are the same as other medications prescribed for you. Read the directions carefully, and ask your doctor or other care provider to review them with you.

## 2017-09-12 NOTE — LETTER
9/12/2017      [facility]  [address] [facility] [address]       Assessment and Plan:  1. DDKT - baseline Cr ~ 1.1-1.3, which has remained stable.  Minimal proteinuria.  No DSA.  Will make no changes in immunosuppression.  2. HTN - well controlled at target of less than 140/90.  No changes.  3. Elevated blood glucose - stable fasting glucose running ~ 100-120s.  Recommend weight loss and increased exercise.  4. Secondary renal hyperparathyroidism - mildly elevated PTH, which has improved post transplant.  Will recheck PTH and see if calcitriol can be stopped.  5. Vitamin D deficiency - improved, low normal vitamin D level and will continue on cholecalciferol.  6. Hypophosphatemia - low serum phosphorus level with last check and will continue on oral phosphorus supplement for now.  Will recheck phosphorus and see if oral phosphorus can be decreased or stopped.  7. Hypomagnesemia - slightly low serum magnesium level with last check and will continue on oral magnesium supplement.  8. Latent TB - patient to continue INH and vitamin B6 until November 2017.  9. Abnormal LFTs - improved, mostly normal LFTs with last check.  Likely secondary to INH and will continue to follow closely.  10. Overweight - recommend increased exercise as able and watch caloric intake.  11. CMV IgG Ab discordant - now off Valcyte.  Negative CMV PCR with last check.  Will continue with qmonth CMV PCR until 12 months post transplant.  12. Left arm swelling - likely some component of central stenosis and recommend doppler of left upper extremity and subclavian venous system.  Recommend patient follow up with vascular access person who placed her AV fistula.  13. Recommend return visit in 3 months.    Assessment and plan was discussed with patient and she voiced her understanding and agreement.    Reason for Visit:  Ms. Romero is here for routine follow up.    HPI:   Angelica Romero is a 51 year old female with ESKD from unclear etiology and is  "status post DDKT on 2/8/17.         Transplant Hx:       Tx: DDKT  Date: 2/8/17       Present Maintenance IS: Tacrolimus and Mycophenolate mofetil       Baseline Creatinine: 1.1-1.3       Recent DSA: No  Date last checked: 8/2017       Biopsy: No    Ms. Romero reports feeling good overall with some medical complaints.  Her energy level has remained good and pretty much normal.  She is active, although really gets minimal exercise as she says she has been \"lazy.\"  She said Avexxin will start soon and that will help.  Denies any chest pain or shortness of breath with exertion.  She does report some issues with getting to sleep, but not all the time.  Appetite is \"too good\" and she has gained about 10 lbs in the last 3 months.  No nausea, vomiting or diarrhea.  No fever, sweats or chills.  No leg swelling.  She does report some increased swelling and occasional discomfort of her left arm, where she has an AV fistula.    Home BP: 120-130/70-80s.      ROS:   A comprehensive review of systems was obtained and negative, except as noted in the HPI or PMH.    Active Medical Problems:  Patient Active Problem List   Diagnosis     Hypertension secondary to other renal disorders     Obesity, unspecified     Kidney replaced by transplant     Immunosuppressed status (H)     Aftercare following organ transplant     Secondary renal hyperparathyroidism (H)     Vitamin D deficiency     Gastroesophageal reflux disease without esophagitis     Hypophosphatemia     Latent tuberculosis       Personal Hx:  Social History     Social History     Marital status:      Spouse name: N/A     Number of children: N/A     Years of education: N/A     Occupational History     Not on file.     Social History Main Topics     Smoking status: Former Smoker     Packs/day: 0.20     Years: 15.00     Types: Cigarettes     Quit date: 1/1/2016     Smokeless tobacco: Never Used     Alcohol use 1.8 oz/week     3 Standard drinks or equivalent per week "     Drug use: No     Sexual activity: Not on file     Other Topics Concern     Not on file     Social History Narrative       Allergies:  No Known Allergies    Medications:  Prior to Admission medications    Medication Sig Start Date End Date Taking? Authorizing Provider   phosphorus tablet 250 mg (K PHOS NEUTRAL) 250 MG per tablet Take 2 tablets (500 mg) by mouth 2 times daily 3/30/17  Yes Bill Tejeda MD   omeprazole (PRILOSEC) 20 MG CR capsule Take 1 capsule (20 mg) by mouth daily 3/30/17  Yes Bill Tejeda MD   magnesium oxide (MAG-OX) 400 MG tablet Take 2 tablets (800 mg) by mouth 2 times daily 3/7/17  Yes Jose Naranjo MD   valGANciclovir (VALCYTE) 450 MG tablet Take 1 tablet (450 mg) by mouth daily You should take this medication for 6 months total after your transplant. 2/23/17  Yes Bill Tjeeda MD   isoniazid (NYDRAZID) 300 MG tablet Take 1 tablet (300 mg) by mouth daily Take for 9 months total. You should have your liver function tests drawn monthly while on this medication. 2/17/17  Yes Thony Montes MD   mycophenolate (CELLCEPT - GENERIC EQUIVALENT) 250 MG capsule Take 3 capsules (750 mg) by mouth 2 times daily 2/17/17  Yes Thony Montes MD   sulfamethoxazole-trimethoprim (BACTRIM/SEPTRA) 400-80 MG per tablet Take 1 tablet by mouth three times a week Take on Monday, Wednesday, and Saturday. The dose of this medication may need to be changed as your kidney function improves (ask your Nephrologist). 2/18/17  Yes Thony Montes MD   tacrolimus (PROGRAF - GENERIC EQUIVALENT) 0.5 MG capsule Take 1 capsule (0.5 mg) by mouth 2 times daily Total dose = 3.5 mg BID 2/17/17  Yes Thony Montes MD   tacrolimus (PROGRAF - GENERIC EQUIVALENT) 1 MG capsule Take 3 capsules (3 mg) by mouth 2 times daily Total dose = 3.5 mg BID 2/17/17  Yes Thony Montes MD   ondansetron (ZOFRAN-ODT) 4 MG ODT tab Take 1 tablet (4 mg) by mouth every 6 hours as needed for nausea 2/11/17  Yes  "Felisha Coy MD   amLODIPine (NORVASC) 10 MG tablet Take 1 tablet (10 mg) by mouth daily 2/11/17  Yes Felisha Coy MD   senna-docusate (SENOKOT-S;PERICOLACE) 8.6-50 MG per tablet Take 1-2 tablets by mouth 2 times daily as needed for constipation 2/11/17  Yes Felisha Coy MD   pyridOXINE (VITAMIN B-6) 25 MG tablet Take 1 tablet (25 mg) by mouth daily 2/11/17  Yes Felisha Coy MD   metoprolol (LOPRESSOR) 50 MG tablet Take 1 tablet (50 mg) by mouth 2 times daily 2/11/17  Yes Felisha Coy MD   polyethylene glycol (MIRALAX) powder Take 17 g by mouth daily as needed.   Yes Reported, Patient       Vitals:  /79  Pulse 77  Ht 1.575 m (5' 2\")  Wt 90.9 kg (200 lb 6.4 oz)  SpO2 95%  BMI 36.65 kg/m2    Exam:   GENERAL APPEARANCE: alert and no distress  HENT: mouth without ulcers or lesions  LYMPHATICS: no cervical or supraclavicular nodes  RESP: lungs clear to auscultation - no rales, rhonchi or wheezes  CV: regular rhythm, normal rate, no rub, no murmur  EDEMA: trace LE edema bilaterally; some general swelling of left arm  ABDOMEN: soft, nondistended, nontender, bowel sounds normal, overweight  MS: extremities normal - no gross deformities noted, no evidence of inflammation in joints, no muscle tenderness; left upper extremity AV fistula with good thrill  SKIN: no rash  TX KIDNEY: normal    Results:   Recent Results (from the past 336 hour(s))   Tacrolimus level    Collection Time: 09/05/17 10:42 AM   Result Value Ref Range    Tacrolimus Last Dose 09/04/17  2330     Tacrolimus Level 8.8 5.0 - 15.0 ug/L   TXP External Lab Result    Collection Time: 09/05/17 10:43 AM   Result Value Ref Range    WBC Count (External) 5.8 4.1 - 10.9 x10^3/uL    RBC Count (External) 4.17 3.85 - 5.05 x10^6/uL    Hemoglobin (External) 13.4 11.7 - 15.5 g/dL    Hematocrit (External) 38.7 35.0 - 46.0 %    MCV (External) 92.8 82.0 - 99.0 fL    MCH (External) 32.1 27.0 - 34.0 pg    MCHC (External) 34.6 32.0 - " 36.0 g/dL    RDW (External) 13.5 11.5 - 15.0 %    Platelet Count (External) 236 150 - 450 x10^3/uL    MPV (External) 7.1 6.0 - 9.5 fL    Sodium (External) 138 133 - 144 mmol/L    Potassium (External) 3.5 3.4 - 5.1 mmol/L    Chloride (External) 104 96 - 109 mmol/L    CO2 (External) 24 21 - 33 mmol/L    Anion Gap (External) 10 2 - 16 mmol/L    Urea Nitrogen (External) 12 6 - 24 mg/dL    Creatinine (External) 1.1 (H) 0.4 - 1.0 mg/dL    Glucose (External) 122 (H) 70 - 99 mg/dL    Calcium (External) 8.9 8.2 - 10.0 mg/dL    GFR Estimated (External) 52.2 (L) 60.0 - 150.0 mL/min    Albumin (External) 3.3 3.3 - 5.0 g/dL    Bilirubin Total (External) 0.5 0.4 - 1.4 mg/dL    Bilirubin Direct (External) 0.1 0.0 - 0.3 mg/dL    AST (External) 29 14 - 41 U/L    Alk Phosphatase (External) 98 45 - 115 IU/L    ALT (External) 58 (H) 7 - 55 U/L    Protein Total (External) 6.5 6.2 - 8.2 g/dL       Bill Tejeda MD

## 2017-09-12 NOTE — PROGRESS NOTES
Assessment and Plan:  1. DDKT - baseline Cr ~ 1.1-1.3, which has remained stable.  Minimal proteinuria.  No DSA.  Will make no changes in immunosuppression.  2. HTN - well controlled at target of less than 140/90.  No changes.  3. Elevated blood glucose - stable fasting glucose running ~ 100-120s.  Recommend weight loss and increased exercise.  4. Secondary renal hyperparathyroidism - mildly elevated PTH, which has improved post transplant.  Will recheck PTH and see if calcitriol can be stopped.  5. Vitamin D deficiency - improved, low normal vitamin D level and will continue on cholecalciferol.  6. Hypophosphatemia - low serum phosphorus level with last check and will continue on oral phosphorus supplement for now.  Will recheck phosphorus and see if oral phosphorus can be decreased or stopped.  7. Hypomagnesemia - slightly low serum magnesium level with last check and will continue on oral magnesium supplement.  8. Latent TB - patient to continue INH and vitamin B6 until November 2017.  9. Abnormal LFTs - improved, mostly normal LFTs with last check.  Likely secondary to INH and will continue to follow closely.  10. Overweight - recommend increased exercise as able and watch caloric intake.  11. CMV IgG Ab discordant - now off Valcyte.  Negative CMV PCR with last check.  Will continue with qmonth CMV PCR until 12 months post transplant.  12. Left arm swelling - likely some component of central stenosis and recommend doppler of left upper extremity and subclavian venous system.  Recommend patient follow up with vascular access person who placed her AV fistula.  13. Recommend return visit in 3 months.    Assessment and plan was discussed with patient and she voiced her understanding and agreement.    Reason for Visit:  Ms. Romero is here for routine follow up.    HPI:   Angelica Romero is a 51 year old female with ESKD from unclear etiology and is status post DDKT on 2/8/17.         Transplant Hx:       Tx: DDKT  Date:  "2/8/17       Present Maintenance IS: Tacrolimus and Mycophenolate mofetil       Baseline Creatinine: 1.1-1.3       Recent DSA: No  Date last checked: 8/2017       Biopsy: No    Ms. Romero reports feeling good overall with some medical complaints.  Her energy level has remained good and pretty much normal.  She is active, although really gets minimal exercise as she says she has been \"lazy.\"  She said [employer] will start soon and that will help.  Denies any chest pain or shortness of breath with exertion.  She does report some issues with getting to sleep, but not all the time.  Appetite is \"too good\" and she has gained about 10 lbs in the last 3 months.  No nausea, vomiting or diarrhea.  No fever, sweats or chills.  No leg swelling.  She does report some increased swelling and occasional discomfort of her left arm, where she has an AV fistula.    Home BP: 120-130/70-80s.      ROS:   A comprehensive review of systems was obtained and negative, except as noted in the HPI or PMH.    Active Medical Problems:  Patient Active Problem List   Diagnosis     Hypertension secondary to other renal disorders     Obesity, unspecified     Kidney replaced by transplant     Immunosuppressed status (H)     Aftercare following organ transplant     Secondary renal hyperparathyroidism (H)     Vitamin D deficiency     Gastroesophageal reflux disease without esophagitis     Hypophosphatemia     Latent tuberculosis       Personal Hx:  Social History     Social History     Marital status:      Spouse name: N/A     Number of children: N/A     Years of education: N/A     Occupational History     Not on file.     Social History Main Topics     Smoking status: Former Smoker     Packs/day: 0.20     Years: 15.00     Types: Cigarettes     Quit date: 1/1/2016     Smokeless tobacco: Never Used     Alcohol use 1.8 oz/week     3 Standard drinks or equivalent per week     Drug use: No     Sexual activity: Not on file     Other Topics " Concern     Not on file     Social History Narrative       Allergies:  No Known Allergies    Medications:  Prior to Admission medications    Medication Sig Start Date End Date Taking? Authorizing Provider   phosphorus tablet 250 mg (K PHOS NEUTRAL) 250 MG per tablet Take 2 tablets (500 mg) by mouth 2 times daily 3/30/17  Yes Bill Tejeda MD   omeprazole (PRILOSEC) 20 MG CR capsule Take 1 capsule (20 mg) by mouth daily 3/30/17  Yes Bill Tejeda MD   magnesium oxide (MAG-OX) 400 MG tablet Take 2 tablets (800 mg) by mouth 2 times daily 3/7/17  Yes Jose Naranjo MD   valGANciclovir (VALCYTE) 450 MG tablet Take 1 tablet (450 mg) by mouth daily You should take this medication for 6 months total after your transplant. 2/23/17  Yes Bill Tejeda MD   isoniazid (NYDRAZID) 300 MG tablet Take 1 tablet (300 mg) by mouth daily Take for 9 months total. You should have your liver function tests drawn monthly while on this medication. 2/17/17  Yes Thony Montes MD   mycophenolate (CELLCEPT - GENERIC EQUIVALENT) 250 MG capsule Take 3 capsules (750 mg) by mouth 2 times daily 2/17/17  Yes Thony Montes MD   sulfamethoxazole-trimethoprim (BACTRIM/SEPTRA) 400-80 MG per tablet Take 1 tablet by mouth three times a week Take on Monday, Wednesday, and Saturday. The dose of this medication may need to be changed as your kidney function improves (ask your Nephrologist). 2/18/17  Yes Thony Montes MD   tacrolimus (PROGRAF - GENERIC EQUIVALENT) 0.5 MG capsule Take 1 capsule (0.5 mg) by mouth 2 times daily Total dose = 3.5 mg BID 2/17/17  Yes Thony Montes MD   tacrolimus (PROGRAF - GENERIC EQUIVALENT) 1 MG capsule Take 3 capsules (3 mg) by mouth 2 times daily Total dose = 3.5 mg BID 2/17/17  Yes Thony Montes MD   ondansetron (ZOFRAN-ODT) 4 MG ODT tab Take 1 tablet (4 mg) by mouth every 6 hours as needed for nausea 2/11/17  Yes Felisha Coy MD   amLODIPine (NORVASC) 10 MG tablet Take 1  "tablet (10 mg) by mouth daily 2/11/17  Yes Felisha Coy MD   senna-docusate (SENOKOT-S;PERICOLACE) 8.6-50 MG per tablet Take 1-2 tablets by mouth 2 times daily as needed for constipation 2/11/17  Yes Felisha Coy MD   pyridOXINE (VITAMIN B-6) 25 MG tablet Take 1 tablet (25 mg) by mouth daily 2/11/17  Yes Felisha Coy MD   metoprolol (LOPRESSOR) 50 MG tablet Take 1 tablet (50 mg) by mouth 2 times daily 2/11/17  Yes Felisha Coy MD   polyethylene glycol (MIRALAX) powder Take 17 g by mouth daily as needed.   Yes Reported, Patient       Vitals:  /79  Pulse 77  Ht 1.575 m (5' 2\")  Wt 90.9 kg (200 lb 6.4 oz)  SpO2 95%  BMI 36.65 kg/m2    Exam:   GENERAL APPEARANCE: alert and no distress  HENT: mouth without ulcers or lesions  LYMPHATICS: no cervical or supraclavicular nodes  RESP: lungs clear to auscultation - no rales, rhonchi or wheezes  CV: regular rhythm, normal rate, no rub, no murmur  EDEMA: trace LE edema bilaterally; some general swelling of left arm  ABDOMEN: soft, nondistended, nontender, bowel sounds normal, overweight  MS: extremities normal - no gross deformities noted, no evidence of inflammation in joints, no muscle tenderness; left upper extremity AV fistula with good thrill  SKIN: no rash  TX KIDNEY: normal    Results:   Recent Results (from the past 336 hour(s))   Tacrolimus level    Collection Time: 09/05/17 10:42 AM   Result Value Ref Range    Tacrolimus Last Dose 09/04/17  2330     Tacrolimus Level 8.8 5.0 - 15.0 ug/L   TXP External Lab Result    Collection Time: 09/05/17 10:43 AM   Result Value Ref Range    WBC Count (External) 5.8 4.1 - 10.9 x10^3/uL    RBC Count (External) 4.17 3.85 - 5.05 x10^6/uL    Hemoglobin (External) 13.4 11.7 - 15.5 g/dL    Hematocrit (External) 38.7 35.0 - 46.0 %    MCV (External) 92.8 82.0 - 99.0 fL    MCH (External) 32.1 27.0 - 34.0 pg    MCHC (External) 34.6 32.0 - 36.0 g/dL    RDW (External) 13.5 11.5 - 15.0 %    Platelet Count " (External) 236 150 - 450 x10^3/uL    MPV (External) 7.1 6.0 - 9.5 fL    Sodium (External) 138 133 - 144 mmol/L    Potassium (External) 3.5 3.4 - 5.1 mmol/L    Chloride (External) 104 96 - 109 mmol/L    CO2 (External) 24 21 - 33 mmol/L    Anion Gap (External) 10 2 - 16 mmol/L    Urea Nitrogen (External) 12 6 - 24 mg/dL    Creatinine (External) 1.1 (H) 0.4 - 1.0 mg/dL    Glucose (External) 122 (H) 70 - 99 mg/dL    Calcium (External) 8.9 8.2 - 10.0 mg/dL    GFR Estimated (External) 52.2 (L) 60.0 - 150.0 mL/min    Albumin (External) 3.3 3.3 - 5.0 g/dL    Bilirubin Total (External) 0.5 0.4 - 1.4 mg/dL    Bilirubin Direct (External) 0.1 0.0 - 0.3 mg/dL    AST (External) 29 14 - 41 U/L    Alk Phosphatase (External) 98 45 - 115 IU/L    ALT (External) 58 (H) 7 - 55 U/L    Protein Total (External) 6.5 6.2 - 8.2 g/dL

## 2017-09-12 NOTE — LETTER
PHYSICIAN ORDERS      DATE & TIME ISSUED: 2017 4:09 PM  PATIENT NAME: Angelica Romero   : 1965     [facility] MR# [if applicable]: [id number]     DIAGNOSIS:  Kidney Transplant  ICD-9 CODE: Z94.0     Please complete the following labs at the next routine transplant lab draw:  Phosphorus  Magnesium    Any questions please call: [phone]    Please fax these results to [phone].    .

## 2017-09-12 NOTE — NURSING NOTE
"Chief Complaint   Patient presents with     RECHECK     Follow up kidney transplant.       Initial /79  Pulse 77  Ht 1.575 m (5' 2\")  Wt 90.9 kg (200 lb 6.4 oz)  SpO2 95%  BMI 36.65 kg/m2 Estimated body mass index is 36.65 kg/(m^2) as calculated from the following:    Height as of this encounter: 1.575 m (5' 2\").    Weight as of this encounter: 90.9 kg (200 lb 6.4 oz).  Medication Reconciliation: complete   Mariely Knott., CMA    "

## 2017-09-13 LAB
BKV DNA # SPEC NAA+PROBE: NORMAL COPIES/ML
BKV DNA SPEC NAA+PROBE-LOG#: NORMAL LOG COPIES/ML
CMV DNA SPEC NAA+PROBE-ACNC: NORMAL [IU]/ML
CMV DNA SPEC NAA+PROBE-LOG#: NORMAL {LOG_IU}/ML
SPECIMEN SOURCE: NORMAL
SPECIMEN SOURCE: NORMAL

## 2017-09-19 DIAGNOSIS — Z94.0 KIDNEY REPLACED BY TRANSPLANT: ICD-10-CM

## 2017-09-19 DIAGNOSIS — Z48.298 AFTERCARE FOLLOWING ORGAN TRANSPLANT: ICD-10-CM

## 2017-09-19 PROCEDURE — 80197 ASSAY OF TACROLIMUS: CPT | Performed by: INTERNAL MEDICINE

## 2017-09-21 ENCOUNTER — TELEPHONE (OUTPATIENT)
Dept: TRANSPLANT | Facility: CLINIC | Age: 52
End: 2017-09-21

## 2017-09-22 LAB
TACROLIMUS BLD-MCNC: 8.8 UG/L (ref 5–15)
TME LAST DOSE: NORMAL H

## 2017-09-25 ENCOUNTER — TELEPHONE (OUTPATIENT)
Dept: TRANSPLANT | Facility: CLINIC | Age: 52
End: 2017-09-25

## 2017-09-25 NOTE — TELEPHONE ENCOUNTER
"Called regarding swollen fistula  more redness and painful -- Has follow up in [place] with Vascular surgery Oct 4,2017    Plan   Went to Vascular Surgeon [facility] \" tied off the fistula\" --   Asked medical records    "

## 2017-10-03 DIAGNOSIS — Z48.298 AFTERCARE FOLLOWING ORGAN TRANSPLANT: ICD-10-CM

## 2017-10-03 DIAGNOSIS — Z94.0 KIDNEY REPLACED BY TRANSPLANT: ICD-10-CM

## 2017-10-03 PROCEDURE — 80197 ASSAY OF TACROLIMUS: CPT | Performed by: INTERNAL MEDICINE

## 2017-10-06 LAB
TACROLIMUS BLD-MCNC: 6.6 UG/L (ref 5–15)
TME LAST DOSE: NORMAL H

## 2017-10-07 ENCOUNTER — TELEPHONE (OUTPATIENT)
Dept: TRANSPLANT | Facility: CLINIC | Age: 52
End: 2017-10-07

## 2017-10-07 NOTE — LETTER
PHYSICIAN ORDERS      DATE & TIME ISSUED: 2017 11:07 AM  PATIENT NAME: Angelica Romero   : 1965     [facility] MR# [if applicable]: [id number]     DIAGNOSIS:  Kidney Transplant  ICD-9 CODE: Z94.0     Please complete the following lab during the week of 2017:  LFT's    Any questions please call: [phone]    Please fax these results to [phone].    .

## 2017-10-07 NOTE — TELEPHONE ENCOUNTER
Bill Tejeda MD Huepfel, Mary K, RN                     Slight increase in liver transaminases.  Would repeat in 2-3 weeks.  Patient should be able to stop INH in November 2017.

## 2017-10-09 NOTE — TELEPHONE ENCOUNTER
Updated current standing lab orders.  Mailed copy to patient and faxed copy to [facility] lab @ [phone]

## 2017-10-17 ENCOUNTER — RESULTS ONLY (OUTPATIENT)
Dept: OTHER | Facility: CLINIC | Age: 52
End: 2017-10-17

## 2017-10-17 DIAGNOSIS — Z94.0 KIDNEY REPLACED BY TRANSPLANT: ICD-10-CM

## 2017-10-17 DIAGNOSIS — Z48.298 AFTERCARE FOLLOWING ORGAN TRANSPLANT: ICD-10-CM

## 2017-10-17 PROCEDURE — 80197 ASSAY OF TACROLIMUS: CPT | Performed by: INTERNAL MEDICINE

## 2017-10-17 PROCEDURE — 86833 HLA CLASS II HIGH DEFIN QUAL: CPT | Performed by: TRANSPLANT SURGERY

## 2017-10-17 PROCEDURE — 86832 HLA CLASS I HIGH DEFIN QUAL: CPT | Performed by: TRANSPLANT SURGERY

## 2017-10-19 ENCOUNTER — TELEPHONE (OUTPATIENT)
Dept: TRANSPLANT | Facility: CLINIC | Age: 52
End: 2017-10-19

## 2017-10-19 NOTE — LETTER
PHYSICIAN ORDERS      DATE & TIME ISSUED: 10/19/2017  2:24 PM  PATIENT NAME: Angelica Romero   : 1965     [facility] MR# [if applicable]: [id number]     DIAGNOSIS:  Kidney Transplant  ICD-10 CODE: Z94.0     Please complete the following lab during the week of 2017, then  Every 2 weeks X 6 weeks:  LFT's    Any questions please call: [phone]    Please fax these results to [phone].    .

## 2017-10-19 NOTE — TELEPHONE ENCOUNTER
Trend up in liver transaminases likely due to INH.  Patient has now completed 36 weeks of treatment, which is considered to be full treatment and she can stop the INH and vitamin B6.  Would repeat liver transaminase labs in 2 weeks after being off INH.  Otherwise, labs are normal or unchanged.  Would make no other changes or interventions at this time.

## 2017-10-19 NOTE — TELEPHONE ENCOUNTER
Left message for patient to return call and confirm INH therapy completion.  Instructed patient to stop INH and Vitamin B6 if she has completed 36 weeks of INH therapy.  Also instructed patient repeat LFT's 2 weeks after INH, every 2 weeks X 6 weeks.  Lab orders updated and faxed to patients lab.

## 2017-10-20 LAB
TACROLIMUS BLD-MCNC: 6.2 UG/L (ref 5–15)
TME LAST DOSE: NORMAL H

## 2017-10-23 LAB — PRA DONOR SPECIFIC ABY: NORMAL

## 2017-10-24 NOTE — TELEPHONE ENCOUNTER
Please call Angelica Romero to confirm that she has received instructions to stop INH and to repeat LFT's

## 2017-10-25 NOTE — TELEPHONE ENCOUNTER
Left message for patient regarding confirmation INH therapy completion.  Instructed patient to stop INH and Vitamin B6 if she has completed 36 weeks of INH therapy.  Also instructed patient repeat LFT's 2 weeks after INH, every 2 weeks X 6 weeks.  Instructed patient return call and confirm above info.

## 2017-10-27 NOTE — TELEPHONE ENCOUNTER
Left message for patient regarding confirmation INH therapy completion.  Instructed patient to stop INH and Vitamin B6 if she has completed 36 weeks of INH therapy.  Also instructed patient repeat LFT's 2 weeks after INH, every 2 weeks X 6 weeks.  Instructed patient return call and confirm above info

## 2017-10-31 DIAGNOSIS — Z48.298 AFTERCARE FOLLOWING ORGAN TRANSPLANT: ICD-10-CM

## 2017-10-31 DIAGNOSIS — Z94.0 KIDNEY REPLACED BY TRANSPLANT: ICD-10-CM

## 2017-10-31 PROCEDURE — 80197 ASSAY OF TACROLIMUS: CPT | Performed by: INTERNAL MEDICINE

## 2017-11-03 ENCOUNTER — TELEPHONE (OUTPATIENT)
Dept: TRANSPLANT | Facility: CLINIC | Age: 52
End: 2017-11-03

## 2017-11-03 DIAGNOSIS — Z94.0 KIDNEY REPLACED BY TRANSPLANT: Primary | ICD-10-CM

## 2017-11-03 NOTE — TELEPHONE ENCOUNTER
----- Message from Bill Tejeda MD sent at 11/3/2017  1:10 PM CDT -----  Regarding: RE: stopped INH  2 weeks ago  ast alt are higher this week - I assume repeat   Repeat next week.  If higher, would get her in to see Hepatology.    Thanks.  ----- Message -----     From: Nereida Burnham RN     Sent: 11/3/2017  11:28 AM       To: Bill Tejeda MD, Marium Ansari LPN  Subject: stopped INH  2 weeks ago  ast alt are higher#    In 2 weeks

## 2017-11-03 NOTE — TELEPHONE ENCOUNTER
Called Angelica Romero who confirmed that she stopped her INH   Reviewed that her AST /ALT slightly increased

## 2017-11-03 NOTE — LETTER
PHYSICIAN ORDERS      DATE & TIME ISSUED: 11/10/2017  10:07 AM  PATIENT NAME: Angelica Romero   : 1965     [facility] MR# [if applicable]: [id number]     DIAGNOSIS:  Kidney Transplant  ICD-10 CODE: Z94.0     Please complete the following lab next week:  LFT's    Any questions please call: [phone]    Please fax these results to [phone].    .

## 2017-11-05 LAB
TACROLIMUS BLD-MCNC: 6.6 UG/L (ref 5–15)
TME LAST DOSE: NORMAL H

## 2017-11-12 ENCOUNTER — HEALTH MAINTENANCE LETTER (OUTPATIENT)
Age: 52
End: 2017-11-12

## 2017-11-14 ENCOUNTER — RESULTS ONLY (OUTPATIENT)
Dept: OTHER | Facility: CLINIC | Age: 52
End: 2017-11-14

## 2017-11-14 DIAGNOSIS — Z48.298 AFTERCARE FOLLOWING ORGAN TRANSPLANT: ICD-10-CM

## 2017-11-14 DIAGNOSIS — Z94.0 KIDNEY REPLACED BY TRANSPLANT: ICD-10-CM

## 2017-11-14 PROCEDURE — 86833 HLA CLASS II HIGH DEFIN QUAL: CPT | Performed by: TRANSPLANT SURGERY

## 2017-11-14 PROCEDURE — 80197 ASSAY OF TACROLIMUS: CPT | Performed by: INTERNAL MEDICINE

## 2017-11-14 PROCEDURE — 86832 HLA CLASS I HIGH DEFIN QUAL: CPT | Performed by: TRANSPLANT SURGERY

## 2017-11-16 LAB
TACROLIMUS BLD-MCNC: 6.3 UG/L (ref 5–15)
TME LAST DOSE: NORMAL H

## 2017-11-17 LAB — PRA DONOR SPECIFIC ABY: NORMAL

## 2017-11-17 NOTE — TELEPHONE ENCOUNTER
Issue increase AST ALT  Please review previous epic notes review with Dr Tejeda   Plan:   INH stopped 4 weeks - ALT /AST are still elevated   Dr Tejeda requested referral to GI - orders in EPIC

## 2017-11-20 ENCOUNTER — TELEPHONE (OUTPATIENT)
Dept: TRANSPLANT | Facility: CLINIC | Age: 52
End: 2017-11-20

## 2017-11-20 DIAGNOSIS — B25.9 CYTOMEGALOVIRUS (CMV) VIREMIA (H): Primary | ICD-10-CM

## 2017-11-20 RX ORDER — VALGANCICLOVIR 450 MG/1
900 TABLET, FILM COATED ORAL 2 TIMES DAILY
Qty: 120 TABLET | Refills: 0 | Status: SHIPPED | OUTPATIENT
Start: 2017-11-20 | End: 2017-12-22

## 2017-11-20 NOTE — TELEPHONE ENCOUNTER
"Issue CMV PCR QT  11,800 copies    Please note CMV mismatch at the time of transplant  On 2/28/2017  Called Angelica Romero who reports has fatigue and felt warm for the past 3 weeks - \" I thought  I was being lazy - temp high 99 \"  Denies any cough nausea vomiting or any other viral symptoms     Reviewed with  Dr Nikhil Baldwin 900 mg twice per day for 2 weeks the dose per CMV PCR QT   Weekly transplant  Labs with CMV PCR QT        "

## 2017-11-28 ENCOUNTER — RESULTS ONLY (OUTPATIENT)
Dept: OTHER | Facility: CLINIC | Age: 52
End: 2017-11-28

## 2017-11-28 DIAGNOSIS — Z94.0 KIDNEY REPLACED BY TRANSPLANT: ICD-10-CM

## 2017-11-28 DIAGNOSIS — Z48.298 AFTERCARE FOLLOWING ORGAN TRANSPLANT: ICD-10-CM

## 2017-11-28 PROCEDURE — 86832 HLA CLASS I HIGH DEFIN QUAL: CPT | Performed by: TRANSPLANT SURGERY

## 2017-11-28 PROCEDURE — 86833 HLA CLASS II HIGH DEFIN QUAL: CPT | Performed by: TRANSPLANT SURGERY

## 2017-12-03 LAB
TACROLIMUS BLD-MCNC: 9.2 UG/L (ref 5–15)
TME LAST DOSE: NORMAL H

## 2017-12-05 LAB — PRA DONOR SPECIFIC ABY: NORMAL

## 2017-12-06 LAB
CW4: 2542
CW4: 905
DONOR IDENTIFICATION: NORMAL
DONOR IDENTIFICATION: NORMAL
DSA COMMENTS: NORMAL
DSA COMMENTS: NORMAL
DSA PRESENT: YES
DSA PRESENT: YES
DSA TEST METHOD: NORMAL
DSA TEST METHOD: NORMAL
ORGAN: NORMAL
ORGAN: NORMAL
SA1 CELL: NORMAL
SA1 CELL: NORMAL
SA1 COMMENTS: NORMAL
SA1 COMMENTS: NORMAL
SA1 HI RISK ABY: NORMAL
SA1 HI RISK ABY: NORMAL
SA1 MOD RISK ABY: NORMAL
SA1 MOD RISK ABY: NORMAL
SA1 TEST METHOD: NORMAL
SA1 TEST METHOD: NORMAL
SA2 CELL: NORMAL
SA2 CELL: NORMAL
SA2 COMMENTS: NORMAL
SA2 COMMENTS: NORMAL
SA2 HI RISK ABY UA: NORMAL
SA2 HI RISK ABY UA: NORMAL
SA2 MOD RISK ABY: NORMAL
SA2 MOD RISK ABY: NORMAL
SA2 TEST METHOD: NORMAL
SA2 TEST METHOD: NORMAL

## 2017-12-07 ENCOUNTER — TELEPHONE (OUTPATIENT)
Dept: TRANSPLANT | Facility: CLINIC | Age: 52
End: 2017-12-07

## 2017-12-12 PROCEDURE — 80197 ASSAY OF TACROLIMUS: CPT | Performed by: INTERNAL MEDICINE

## 2017-12-14 LAB
TACROLIMUS BLD-MCNC: 5.9 UG/L (ref 5–15)
TME LAST DOSE: NORMAL H

## 2017-12-15 NOTE — LETTER
OUTPATIENT LABORATORY TEST ORDER    Patient Name:Angelica Romero   Transplant Date: 2/8/2017  YOB: 1965  Issue Date & Time: 12/15/2017  9:07 AM  [facility] MR: [id number]  Expiration Date:  (1 year after date issued)      Diagnoses: Aftercare following organ transplant (ICD-10  Z48.288)   Kidney Transplant (ICD-10  Z94.0)   Long term use of medications (ICD-10  Z79.899)     ?Lab results to be available on the same day drawn.   ?Patient should release information to the [facility], [facility].  ?Please fax to the Transplant Center at [phone].    Weekly   ?Hemogram and Platelet   ?Basic Metabolic Panel (Sodium, Potassium,Chloride, CO2, Creatinine, Urea Nitrogen, Glucose, Calcium)   ?Prograf/Tacrolimus drug level    ? CMV PCR QT    Monthly:     ? BK PCR Quantitative (Polyoma virus - blood in purple top tube to Reference Lab)   ? LFT'S    At 12 months post-transplant         Due:  2/2018   ? HBsurfaceAb, HBcoreAb, HCV at 6 months only -   ? Liver Function Tests(Bilirubin Direct/Total, AST, ALT, Alkaline Phosphatase)   ?Complete Lipid Panel fasting (Cholesterol, Triglycerides, HDL, LDL)   ? Random Urine for Protein/Creatinineratio                  ? PRA/DSA level (mailers provided by the patient)    If you have any questions, please call The [facility]plant Center at [phone] or [phone].    Please faxall results to [phone].        Jose Naranjo MD  Department of Surgery

## 2017-12-18 ENCOUNTER — OFFICE VISIT (OUTPATIENT)
Dept: NEPHROLOGY | Facility: CLINIC | Age: 52
End: 2017-12-18
Attending: INTERNAL MEDICINE
Payer: MEDICARE

## 2017-12-18 VITALS
BODY MASS INDEX: 36.84 KG/M2 | TEMPERATURE: 98.6 F | OXYGEN SATURATION: 96 % | HEIGHT: 62 IN | HEART RATE: 72 BPM | WEIGHT: 200.2 LBS | SYSTOLIC BLOOD PRESSURE: 148 MMHG | DIASTOLIC BLOOD PRESSURE: 86 MMHG

## 2017-12-18 DIAGNOSIS — R61 NIGHT SWEATS: ICD-10-CM

## 2017-12-18 DIAGNOSIS — E83.39 HYPOPHOSPHATEMIA: ICD-10-CM

## 2017-12-18 DIAGNOSIS — B25.9 CYTOMEGALOVIRUS (CMV) VIREMIA (H): ICD-10-CM

## 2017-12-18 DIAGNOSIS — E55.9 VITAMIN D DEFICIENCY: ICD-10-CM

## 2017-12-18 DIAGNOSIS — N25.81 SECONDARY RENAL HYPERPARATHYROIDISM (H): ICD-10-CM

## 2017-12-18 DIAGNOSIS — Z94.0 KIDNEY REPLACED BY TRANSPLANT: ICD-10-CM

## 2017-12-18 DIAGNOSIS — R79.89 ABNORMAL LFTS: ICD-10-CM

## 2017-12-18 DIAGNOSIS — Z48.298 AFTERCARE FOLLOWING ORGAN TRANSPLANT: ICD-10-CM

## 2017-12-18 DIAGNOSIS — D84.9 IMMUNOSUPPRESSED STATUS (H): ICD-10-CM

## 2017-12-18 DIAGNOSIS — E83.42 HYPOMAGNESEMIA: ICD-10-CM

## 2017-12-18 DIAGNOSIS — I15.1 HYPERTENSION SECONDARY TO OTHER RENAL DISORDERS: Primary | ICD-10-CM

## 2017-12-18 LAB
ALBUMIN SERPL-MCNC: 3.6 G/DL (ref 3.4–5)
ALP SERPL-CCNC: 108 U/L (ref 40–150)
ALT SERPL W P-5'-P-CCNC: 103 U/L (ref 0–50)
ANION GAP SERPL CALCULATED.3IONS-SCNC: 6 MMOL/L (ref 3–14)
AST SERPL W P-5'-P-CCNC: 67 U/L (ref 0–45)
BILIRUB DIRECT SERPL-MCNC: <0.1 MG/DL (ref 0–0.2)
BILIRUB SERPL-MCNC: 0.6 MG/DL (ref 0.2–1.3)
BUN SERPL-MCNC: 12 MG/DL (ref 7–30)
CALCIUM SERPL-MCNC: 8.9 MG/DL (ref 8.5–10.1)
CHLORIDE SERPL-SCNC: 105 MMOL/L (ref 94–109)
CO2 SERPL-SCNC: 26 MMOL/L (ref 20–32)
CREAT SERPL-MCNC: 1 MG/DL (ref 0.52–1.04)
CREAT UR-MCNC: 156 MG/DL
DEPRECATED CALCIDIOL+CALCIFEROL SERPL-MC: 28 UG/L (ref 20–75)
ERYTHROCYTE [DISTWIDTH] IN BLOOD BY AUTOMATED COUNT: 15.1 % (ref 10–15)
GFR SERPL CREATININE-BSD FRML MDRD: 58 ML/MIN/1.7M2
GLUCOSE SERPL-MCNC: 146 MG/DL (ref 70–99)
HCT VFR BLD AUTO: 40.2 % (ref 35–47)
HGB BLD-MCNC: 13.5 G/DL (ref 11.7–15.7)
MAGNESIUM SERPL-MCNC: 1.6 MG/DL (ref 1.6–2.3)
MCH RBC QN AUTO: 31.8 PG (ref 26.5–33)
MCHC RBC AUTO-ENTMCNC: 33.6 G/DL (ref 31.5–36.5)
MCV RBC AUTO: 95 FL (ref 78–100)
PHOSPHATE SERPL-MCNC: 2.1 MG/DL (ref 2.5–4.5)
PLATELET # BLD AUTO: 242 10E9/L (ref 150–450)
POTASSIUM SERPL-SCNC: 3.9 MMOL/L (ref 3.4–5.3)
PROT SERPL-MCNC: 7 G/DL (ref 6.8–8.8)
PROT UR-MCNC: 0.39 G/L
PROT/CREAT 24H UR: 0.25 G/G CR (ref 0–0.2)
PTH-INTACT SERPL-MCNC: 246 PG/ML (ref 12–72)
RBC # BLD AUTO: 4.25 10E12/L (ref 3.8–5.2)
SODIUM SERPL-SCNC: 137 MMOL/L (ref 133–144)
WBC # BLD AUTO: 2.8 10E9/L (ref 4–11)

## 2017-12-18 PROCEDURE — 36415 COLL VENOUS BLD VENIPUNCTURE: CPT | Performed by: INTERNAL MEDICINE

## 2017-12-18 PROCEDURE — 80076 HEPATIC FUNCTION PANEL: CPT | Performed by: INTERNAL MEDICINE

## 2017-12-18 PROCEDURE — 82306 VITAMIN D 25 HYDROXY: CPT | Performed by: INTERNAL MEDICINE

## 2017-12-18 PROCEDURE — 83735 ASSAY OF MAGNESIUM: CPT | Performed by: INTERNAL MEDICINE

## 2017-12-18 PROCEDURE — 82784 ASSAY IGA/IGD/IGG/IGM EACH: CPT | Performed by: INTERNAL MEDICINE

## 2017-12-18 PROCEDURE — 83970 ASSAY OF PARATHORMONE: CPT | Performed by: INTERNAL MEDICINE

## 2017-12-18 PROCEDURE — 87799 DETECT AGENT NOS DNA QUANT: CPT | Performed by: INTERNAL MEDICINE

## 2017-12-18 PROCEDURE — 80048 BASIC METABOLIC PNL TOTAL CA: CPT | Performed by: INTERNAL MEDICINE

## 2017-12-18 PROCEDURE — 85027 COMPLETE CBC AUTOMATED: CPT | Performed by: INTERNAL MEDICINE

## 2017-12-18 PROCEDURE — 99213 OFFICE O/P EST LOW 20 MIN: CPT | Mod: ZF

## 2017-12-18 PROCEDURE — 84156 ASSAY OF PROTEIN URINE: CPT | Performed by: INTERNAL MEDICINE

## 2017-12-18 PROCEDURE — 84100 ASSAY OF PHOSPHORUS: CPT | Performed by: INTERNAL MEDICINE

## 2017-12-18 RX ORDER — LOSARTAN POTASSIUM 50 MG/1
50 TABLET ORAL AT BEDTIME
Qty: 90 TABLET | Refills: 3 | Status: SHIPPED | OUTPATIENT
Start: 2017-12-18 | End: 2018-11-21

## 2017-12-18 ASSESSMENT — PAIN SCALES - GENERAL: PAINLEVEL: NO PAIN (0)

## 2017-12-18 NOTE — NURSING NOTE
"Chief Complaint   Patient presents with     RECHECK     follow up secondary renal hyperparathyroidism       Initial /86 (BP Location: Right arm, Patient Position: Sitting, Cuff Size: Adult Regular)  Pulse 72  Temp 98.6  F (37  C) (Oral)  Ht 1.575 m (5' 2\")  Wt 90.8 kg (200 lb 3.2 oz)  SpO2 96%  BMI 36.62 kg/m2 Estimated body mass index is 36.62 kg/(m^2) as calculated from the following:    Height as of this encounter: 1.575 m (5' 2\").    Weight as of this encounter: 90.8 kg (200 lb 3.2 oz).  Medication Reconciliation: complete    "

## 2017-12-18 NOTE — MR AVS SNAPSHOT
After Visit Summary   12/18/2017    Angelica Romero    MRN: [id number]           Patient Information     Date Of Birth          1965        Visit Information        Provider Department      12/18/2017 1:05 PM Bill Tejeda MD [facility]        Today's Diagnoses     Hypertension secondary to other renal disorders    -  1    Secondary renal hyperparathyroidism (H)        Hypophosphatemia        Kidney replaced by transplant        Vitamin D deficiency        Immunosuppressed status (H)        Cytomegalovirus (CMV) viremia (H)        Abnormal LFTs        Night sweats        Hypomagnesemia          Care Instructions    Increase losartan to 50 mg nightly.  Confirm stopping oral phosphorus supplement and calcitriol.          Follow-ups after your visit        Follow-up notes from your care team     Return in about 3 months (around 3/18/2018).      Your next 10 appointments already scheduled     Dec 18, 2017  2:15 PM CST   Lab with  LAB   [facility] [place]    [address] [place] [id number] [phone]              Future tests that were ordered for you today     Open Future Orders        Priority Expected Expires Ordered    Basic metabolic panel Routine  1/17/2018 12/18/2017    CBC with platelets Routine  1/17/2018 12/18/2017    Parathyroid Hormone Intact Routine  1/17/2018 12/18/2017    Vitamin D Deficiency Routine  1/17/2018 12/18/2017    CMV DNA quantification Routine  1/17/2018 12/18/2017    EBV DNA PCR Quantitative Whole Blood Routine  1/17/2018 12/18/2017    Protein  random urine with Creat Ratio Routine  1/17/2018 12/18/2017    Phosphorus Routine  1/17/2018 12/18/2017    Magnesium Routine  1/17/2018 12/18/2017    Hepatic panel Routine  1/17/2018 12/18/2017    IgG Routine  1/17/2018 12/18/2017            Who to contact     If you have questions or need follow up information about today's clinic visit or your schedule  "please contact [facility] NEPHROLOG[facility] directly at [phone].  Normal or non-critical lab and imaging results will be communicated to you by MyChart, letter or phone within 4 business days after the clinic has received the results. If you do not hear from us within 7 days, please contact the clinic through [employer] or phone. If you have a critical or abnormal lab result, we will notify you by phone as soon as possible.  Submit refill requests through [employer] or call your pharmacy and they will forward the refill request to us. Please allow 3 business days for your refill to be completed.          Additional Information About Your Visit        [employer] Information     [employer] gives you secure access to your electronic health record. If you see a primary care provider, you can also send messages to your care team and make appointments. If you have questions, please call your primary care clinic.  If you do not have a primary care provider, please call [phone] and they will assist you.        Care EveryWhere ID     This is your Care EveryWhere ID. This could be used by other organizations to access your [facility] medical records  [id number]        Your Vitals Were     Pulse Temperature Height Pulse Oximetry BMI (Body Mass Index)       72 98.6  F (37  C) (Oral) 1.575 m (5' 2\") 96% 36.62 kg/m2        Blood Pressure from Last 3 Encounters:   12/18/17 148/86   09/12/17 126/79   06/08/17 143/84    Weight from Last 3 Encounters:   12/18/17 90.8 kg (200 lb 3.2 oz)   09/12/17 90.9 kg (200 lb 6.4 oz)   06/08/17 86 kg (189 lb 9.6 oz)                 Today's Medication Changes          These changes are accurate as of: 12/18/17  1:51 PM.  If you have any questions, ask your nurse or doctor.               These medicines have changed or have updated prescriptions.        Dose/Directions    losartan 50 MG tablet   Commonly known as:  COZAAR   This may have changed:    - medication strength  - how much to take   Used for:  " Hypertension secondary to other renal disorders   Changed by:  Bill Tejeda MD        Dose:  50 mg   Take 1 tablet (50 mg) by mouth At Bedtime   Quantity:  90 tablet   Refills:  3         Stop taking these medicines if you haven't already. Please contact your care team if you have questions.     calcitRIOL 0.25 MCG capsule   Commonly known as:  ROCALTROL   Stopped by:  Bill Tejeda MD           phosphorus tablet 250 mg 250 MG per tablet   Commonly known as:  PHOSPHA 250 NEUTRAL   Stopped by:  Bill Tejeda MD                Where to get your medicines      These medications were sent to [facility] [address] [place], [place] [id number]     Phone:  [phone]     losartan 50 MG tablet                Primary Care Provider Office Phone # Fax #    Harry Laboy [phone]       [facility] [address] [place] [address]        Equal Access to Services     [facility] AH: Hadii cynthia ku hadasho Soomaali, waaxda luqadaha, qaybta kaalmada adeegyada, waxay alysein haygabinon rolando zuniga . So [facility] [phone].    ATENCIÓN: Si habla español, tiene a park disposición servicios gratuitos de asistencia lingüística. Marleni al [phone].    We comply with applicable federal civil rights laws and [place] laws. We do not discriminate on the basis of race, color, national origin, age, disability, sex, sexual orientation, or gender identity.            Thank you!     Thank you for choosing [facility] NEPHROLOGY  for your care. Our goal is always to provide you with excellent care. Hearing back from our patients is one way we can continue to improve our services. Please take a few minutes to complete the written survey that you may receive in the mail after your visit with us. Thank you!             Your Updated Medication List - Protect others around you: Learn how to safely use, store and throw away your medicines  at www.disposemymeds.org.          This list is accurate as of: 17  1:51 PM.  Always use your most recent med list.                   Brand Name Dispense Instructions for use Diagnosis    amLODIPine 10 MG tablet    NORVASC    30 tablet    Take 1 tablet (10 mg) by mouth daily    Essential hypertension       cholecalciferol 1000 UNIT tablet    vitamin D3    60 tablet    Take 2 tablets (2,000 Units) by mouth daily    -donor kidney transplant, Latent tuberculosis by blood test, Aftercare following organ transplant, Kidney replaced by transplant       losartan 50 MG tablet    COZAAR    90 tablet    Take 1 tablet (50 mg) by mouth At Bedtime    Hypertension secondary to other renal disorders       magnesium oxide 400 MG tablet    MAG-OX    120 tablet    Take 2 tablets (800 mg) by mouth 2 times daily    Hypomagnesemia       metoprolol 50 MG tablet    LOPRESSOR    60 tablet    Take 1 tablet (50 mg) by mouth 2 times daily    Essential hypertension       mycophenolate 250 MG capsule    GENERIC EQUIVALENT    180 capsule    Take 3 capsules (750 mg) by mouth 2 times daily    -donor kidney transplant, Latent tuberculosis by blood test, Aftercare following organ transplant, Kidney replaced by transplant       omeprazole 20 MG CR capsule    priLOSEC    30 capsule    TAKE ONE CAPSULE BY MOUTH ONCE DAILY    Gastroesophageal reflux disease without esophagitis       sulfamethoxazole-trimethoprim 400-80 MG per tablet    BACTRIM/SEPTRA    12 tablet    Take 1 tablet by mouth three times a week Take on Monday, Wednesday, and Saturday. The dose of this medication may need to be changed as your kidney function improves (ask your Nephrologist).    -donor kidney transplant, Latent tuberculosis by blood test, Aftercare following organ transplant, Kidney replaced by transplant       * tacrolimus 0.5 MG capsule    GENERIC EQUIVALENT    60 capsule    Take 1 capsule (0.5 mg) by mouth 2 times daily Total dose = 1.5 mg  BID    Aftercare following organ transplant, Kidney replaced by transplant, Latent tuberculosis by blood test, -donor kidney transplant       * tacrolimus 1 MG capsule    GENERIC EQUIVALENT    60 capsule    Take 1 capsule (1 mg) by mouth 2 times daily Total dose = 1.5 mg BID    -donor kidney transplant, Latent tuberculosis by blood test, Aftercare following organ transplant, Kidney replaced by transplant       valGANciclovir 450 MG tablet    VALCYTE    120 tablet    Take 2 tablets (900 mg) by mouth 2 times daily    Cytomegalovirus (CMV) viremia (H)       * Notice:  This list has 2 medication(s) that are the same as other medications prescribed for you. Read the directions carefully, and ask your doctor or other care provider to review them with you.

## 2017-12-18 NOTE — LETTER
12/18/2017      RE: [address] [facility]  [address] [facility] [address]       Assessment and Plan:  1. DDKT - baseline Cr ~ 1.1-1.3, which has remained stable.  Minimal proteinuria.  No DSA.  Will make no changes in immunosuppression.  2. HTN - fair control right at target of less than 140/90.  Will increase losartan to 50 mg qHS.  3. Elevated blood glucose - stable fasting glucose running ~ 100-120s.  Will increase ARB.  Recommend weight loss and increased exercise.  4. Secondary renal hyperparathyroidism - mildly elevated PTH, which improved post transplant.  Patient has now stopped calcitriol and slight increase in calcitriol.  Will recheck PTH again at next clinic visit.  5. Vitamin D deficiency - slightly low vitamin D level and will continue on cholecalciferol.  6. Hypophosphatemia - slightly low serum phosphorus level and patient thinks she is off oral phosphorus supplement.  Will continue off oral phosphorus and discussed increased dietary phosphorus.  7. Hypomagnesemia - low normal serum magnesium level and will continue on oral magnesium supplement.  8. Latent TB - patient has finished treatment with INH.  9. Abnormal LFTs - improved, but still elevated LFTs.  Initially this was felt secondary to INH, but now may have been due to CMV hepatitis.  Will continue to follow for now.  10. 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  11. Overweight - recommend increased exercise as able and watch caloric intake.  12. Skin cancer risk - no new skin lesions.  Recommend regular follow up with Dermatology.  13. Night sweats - will check EBV PCR.  CMV PCR is negative.  14. Recommend return visit in 3 months.    Assessment and plan was discussed with patient and she voiced her understanding and agreement.    Reason for Visit:  Ms. Romero is here for routine follow up.    HPI:   Angelica Romero is a 51 year old female with ESKD from unclear etiology and is status  "post DDKT on 2/8/17.         Transplant Hx:       Tx: DDKT  Date: 2/8/17       Present Maintenance IS: Tacrolimus and Mycophenolate mofetil       Baseline Creatinine: 1.1-1.3       Recent DSA: No  Date last checked: 8/2017       Biopsy: No    Ms. Romero reports feeling good overall with some medical complaints.  Her energy level is good and pretty much normal, although she reports not sleeping well.  Patient falls asleep easy, but wakes up early and cannot get back to sleep.  She denies feeling tired in the morning or during the day.  She is active, although only gets a little exercise.  Denies any chest pain or shortness of breath with exertion.  Appetite is \"wonderful\" and she is \"never full.\"  Patient's weight is unchanged over the last 3 months, but 15 lbs in the last year.  No nausea, vomiting or diarrhea.  No fever, sweats or chills.  She will get occasional night sweats, but mild and not drenching.  No leg swelling.  No pain or burning with urination.  Patient had issues with left upper extremity swelling at her last clinic visit, likely due to her AV fistula.  She has since had the fistula tied off in September and swelling has resolved.  Patient also notes some burning in her hands and feet at night, which she feels coincides with restarting Valcyte.  No rash or difficulty breathing.    Home BP: 120-140/70-80s.      ROS:   A comprehensive review of systems was obtained and negative, except as noted in the HPI or PMH.    Active Medical Problems:  Patient Active Problem List   Diagnosis     Hypertension secondary to other renal disorders     Obesity, unspecified     Kidney replaced by transplant     Immunosuppressed status (H)     Aftercare following organ transplant     Secondary renal hyperparathyroidism (H)     Vitamin D deficiency     Gastroesophageal reflux disease without esophagitis     Hypophosphatemia     Latent tuberculosis       Personal Hx:  Social History     Social History     Marital status: "      Spouse name: N/A     Number of children: N/A     Years of education: N/A     Occupational History     Not on file.     Social History Main Topics     Smoking status: Former Smoker     Packs/day: 0.20     Years: 15.00     Types: Cigarettes     Quit date: 1/1/2016     Smokeless tobacco: Never Used     Alcohol use 1.8 oz/week     3 Standard drinks or equivalent per week     Drug use: No     Sexual activity: Not on file     Other Topics Concern     Not on file     Social History Narrative       Allergies:  No Known Allergies    Medications:  Prior to Admission medications    Medication Sig Start Date End Date Taking? Authorizing Provider   phosphorus tablet 250 mg (K PHOS NEUTRAL) 250 MG per tablet Take 2 tablets (500 mg) by mouth 2 times daily 3/30/17  Yes Bill Tejeda MD   omeprazole (PRILOSEC) 20 MG CR capsule Take 1 capsule (20 mg) by mouth daily 3/30/17  Yes Bill Tejeda MD   magnesium oxide (MAG-OX) 400 MG tablet Take 2 tablets (800 mg) by mouth 2 times daily 3/7/17  Yes Jose Naranjo MD   valGANciclovir (VALCYTE) 450 MG tablet Take 1 tablet (450 mg) by mouth daily You should take this medication for 6 months total after your transplant. 2/23/17  Yes Bill Tejeda MD   isoniazid (NYDRAZID) 300 MG tablet Take 1 tablet (300 mg) by mouth daily Take for 9 months total. You should have your liver function tests drawn monthly while on this medication. 2/17/17  Yes Thony Montes MD   mycophenolate (CELLCEPT - GENERIC EQUIVALENT) 250 MG capsule Take 3 capsules (750 mg) by mouth 2 times daily 2/17/17  Yes Thony Montes MD   sulfamethoxazole-trimethoprim (BACTRIM/SEPTRA) 400-80 MG per tablet Take 1 tablet by mouth three times a week Take on Monday, Wednesday, and Saturday. The dose of this medication may need to be changed as your kidney function improves (ask your Nephrologist). 2/18/17  Yes Thony Montes MD   tacrolimus (PROGRAF - GENERIC EQUIVALENT) 0.5 MG capsule  "Take 1 capsule (0.5 mg) by mouth 2 times daily Total dose = 3.5 mg BID 2/17/17  Yes Thony Montes MD   tacrolimus (PROGRAF - GENERIC EQUIVALENT) 1 MG capsule Take 3 capsules (3 mg) by mouth 2 times daily Total dose = 3.5 mg BID 2/17/17  Yes Thony Montes MD   ondansetron (ZOFRAN-ODT) 4 MG ODT tab Take 1 tablet (4 mg) by mouth every 6 hours as needed for nausea 2/11/17  Yes Felisha Coy MD   amLODIPine (NORVASC) 10 MG tablet Take 1 tablet (10 mg) by mouth daily 2/11/17  Yes Felisha Coy MD   senna-docusate (SENOKOT-S;PERICOLACE) 8.6-50 MG per tablet Take 1-2 tablets by mouth 2 times daily as needed for constipation 2/11/17  Yes Felisha Coy MD   pyridOXINE (VITAMIN B-6) 25 MG tablet Take 1 tablet (25 mg) by mouth daily 2/11/17  Yes Felisha Coy MD   metoprolol (LOPRESSOR) 50 MG tablet Take 1 tablet (50 mg) by mouth 2 times daily 2/11/17  Yes Felisha Coy MD   polyethylene glycol (MIRALAX) powder Take 17 g by mouth daily as needed.   Yes Reported, Patient       Vitals:  /86 (BP Location: Right arm, Patient Position: Sitting, Cuff Size: Adult Regular)  Pulse 72  Temp 98.6  F (37  C) (Oral)  Ht 1.575 m (5' 2\")  Wt 90.8 kg (200 lb 3.2 oz)  SpO2 96%  BMI 36.62 kg/m2    Exam:   GENERAL APPEARANCE: alert and no distress  HENT: mouth without ulcers or lesions  LYMPHATICS: no cervical or supraclavicular nodes  RESP: lungs clear to auscultation - no rales, rhonchi or wheezes  CV: regular rhythm, normal rate, no rub, no murmur  EDEMA: none to trace LE edema bilaterally  ABDOMEN: soft, nondistended, nontender, bowel sounds normal, overweight  MS: extremities normal - no gross deformities noted, no evidence of inflammation in joints, no muscle tenderness; left upper extremity AV fistula with no thrill  SKIN: no rash  TX KIDNEY: normal    Results:   Recent Results (from the past 168 hour(s))   Basic metabolic panel    Collection Time: 12/18/17  2:37 PM   Result Value Ref Range "    Sodium 137 133 - 144 mmol/L    Potassium 3.9 3.4 - 5.3 mmol/L    Chloride 105 94 - 109 mmol/L    Carbon Dioxide 26 20 - 32 mmol/L    Anion Gap 6 3 - 14 mmol/L    Glucose 146 (H) 70 - 99 mg/dL    Urea Nitrogen 12 7 - 30 mg/dL    Creatinine 1.00 0.52 - 1.04 mg/dL    GFR Estimate 58 (L) >60 mL/min/1.7m2    GFR Estimate If Black 70 >60 mL/min/1.7m2    Calcium 8.9 8.5 - 10.1 mg/dL   CBC with platelets    Collection Time: 12/18/17  2:37 PM   Result Value Ref Range    WBC 2.8 (L) 4.0 - 11.0 10e9/L    RBC Count 4.25 3.8 - 5.2 10e12/L    Hemoglobin 13.5 11.7 - 15.7 g/dL    Hematocrit 40.2 35.0 - 47.0 %    MCV 95 78 - 100 fl    MCH 31.8 26.5 - 33.0 pg    MCHC 33.6 31.5 - 36.5 g/dL    RDW 15.1 (H) 10.0 - 15.0 %    Platelet Count 242 150 - 450 10e9/L   Parathyroid Hormone Intact    Collection Time: 12/18/17  2:37 PM   Result Value Ref Range    Parathyroid Hormone Intact 246 (H) 12 - 72 pg/mL   Vitamin D Deficiency    Collection Time: 12/18/17  2:37 PM   Result Value Ref Range    Vitamin D Deficiency screening 28 20 - 75 ug/L   CMV DNA quantification    Collection Time: 12/18/17  2:37 PM   Result Value Ref Range    CMV DNA Quantitation Specimen Plasma, EDTA anticoagulant     CMV Quant IU/mL CMV DNA Not Detected CMVND^CMV DNA Not Detected [IU]/mL    Log IU/mL of CMVQNT Not Calculated <2.1 [Log_IU]/mL   EBV DNA PCR Quantitative Whole Blood    Collection Time: 12/18/17  2:37 PM   Result Value Ref Range    EBV DNA Copies/mL 1007 (A) EBVNEG^EBV DNA Not Detected [Copies]/mL    EBV DNA Log of Copies 3.0 (H) <2.7 [Log_copies]/mL   Phosphorus    Collection Time: 12/18/17  2:37 PM   Result Value Ref Range    Phosphorus 2.1 (L) 2.5 - 4.5 mg/dL   Magnesium    Collection Time: 12/18/17  2:37 PM   Result Value Ref Range    Magnesium 1.6 1.6 - 2.3 mg/dL   Hepatic panel    Collection Time: 12/18/17  2:37 PM   Result Value Ref Range    Bilirubin Direct <0.1 0.0 - 0.2 mg/dL    Bilirubin Total 0.6 0.2 - 1.3 mg/dL    Albumin 3.6 3.4 - 5.0 g/dL     Protein Total 7.0 6.8 - 8.8 g/dL    Alkaline Phosphatase 108 40 - 150 U/L     (H) 0 - 50 U/L    AST 67 (H) 0 - 45 U/L   IgG    Collection Time: 12/18/17  2:37 PM   Result Value Ref Range    IGG 1050 695 - 1620 mg/dL   Protein  random urine with Creat Ratio    Collection Time: 12/18/17  2:40 PM   Result Value Ref Range    Protein Random Urine 0.39 g/L    Protein Total Urine g/gr Creatinine 0.25 (H) 0 - 0.2 g/g Cr   Creatinine urine calculation only    Collection Time: 12/18/17  2:40 PM   Result Value Ref Range    Creatinine Urine 156 mg/dL   CBC with platelets    Collection Time: 12/19/17 10:36 AM   Result Value Ref Range    WBC Count (External) 2.7 (L) 4.1 - 10.9 x10^3/UL    RBC Count (External) 4.29 3.85 - 5.05 x10^6/uL    Hemoglobin (External) 13.6 11.7 - 15.5 g/dL    Hematocrit (External) 40.1 35.0 - 46.0 %    MCV (External) 93.4 82.0 - 99.0 fL    MCH (External) 31.6 27.0 - 34.0 pg    MCHC (External) 33.9 32.0 - 36.0 g/dL    RDW (External) 16.2 (H) 11.5 - 15.0 %    Platelet Count (External) 248 150 - 450 x10^3/uL   Comprehensive metabolic panel    Collection Time: 12/19/17 10:36 AM   Result Value Ref Range    Sodium (External) 138 133 - 144 mmol/L    Potassium (External) 4.0 3.4 - 5.1 mmol/L    Chloride (External) 104 96 - 109 mmol/L    CO2 (External) 22 21 - 33 mmol/L    Anion Gap (External) 12 2 - 16 mmol/L    Urea Nitrogen (External) 10 6 - 24 mg/dL    Creatinine (External) 1.0 0.4 - 1.0 mg/dL    Glucose (External) 102 (H) 70 - 99 mg/dL    Calcium (External) 9.3 8.2 - 10.0 mg/dL    GFR Estimated (External) 58.2 (L) 60.0 - 150.0 mL/min    Albumin (External) 3.4 3.3 - 5.0 g/dL    Bilirubin Total (External) 0.5 0.4 - 1.4 mg/dL    Bilirubin Direct (External) 0.2 0.0 - 0.3 mg/dL    AST (External) 73 (H) 14 - 41 U/L    Alk Phosphatase (External) 104 45 - 115 IU/L    ALT (External) 107 (H) 7 - 55 U/L    Protein Total (External) 6.7 6.2 - 8.2 g/dL   Tacrolimus level    Collection Time: 12/19/17 10:36 AM    Result Value Ref Range    Tacrolimus Last Dose 12/18/17 2030     Tacrolimus Level 7.5 5.0 - 15.0 ug/L   CMV DNA quantification    Collection Time: 12/19/17 10:36 AM   Result Value Ref Range    CMV DNA Quant (External) Undetected Undetected IU/mL    Log IU/ML of CMVQNT (External) Undetected Undetected IU/mL       Bill Tejeda MD

## 2017-12-19 LAB
CMV DNA SPEC NAA+PROBE-ACNC: NORMAL [IU]/ML
CMV DNA SPEC NAA+PROBE-LOG#: NORMAL {LOG_IU}/ML
IGG SERPL-MCNC: 1050 MG/DL (ref 695–1620)
SPECIMEN SOURCE: NORMAL

## 2017-12-19 PROCEDURE — 80197 ASSAY OF TACROLIMUS: CPT | Performed by: INTERNAL MEDICINE

## 2017-12-20 LAB
EBV DNA # SPEC NAA+PROBE: 1007 {COPIES}/ML
EBV DNA SPEC NAA+PROBE-LOG#: 3 {LOG_COPIES}/ML

## 2017-12-20 NOTE — LETTER
OUTPATIENT LABORATORY TEST ORDER    Patient Name:Angelica Romero   Transplant Date: 2/8/2017  YOB: 1965  Issue Date & Time: 12/20/2017  2:24 pm  [facility] MR: [id number]  Expiration Date:  (1 year after date issued)      Diagnoses: Aftercare following organ transplant (ICD-10  Z48.288)   Kidney Transplant (ICD-10  Z94.0)   Long term use of medications (ICD-10  Z79.899)     ?Lab results to be available on the same day drawn.   ?Patient should release information to the [facility], [facility].     ?Please fax to the Transplant Center at [phone].    Weekly   ?Hemogram and Platelet   ?Basic Metabolic Panel (Sodium, Potassium,Chloride, CO2, Creatinine, Urea Nitrogen, Glucose, Calcium)   ?Prograf/Tacrolimus drug level    ? CMV PCR QT     Weekly X 2 weeks, then Monthly   ?EBV PCR QT    Monthly:     ? BK PCR Quantitative (Polyoma virus - blood in purple top tube to Reference Lab)   ? LFT'S    At 12 months post-transplant         Due:  2/2018   ? Liver Function Tests(Bilirubin Direct/Total, AST, ALT, Alkaline Phosphatase)   ?Complete Lipid Panel fasting (Cholesterol, Triglycerides, HDL, LDL)   ? Random Urine for Protein/Creatinineratio                 ? PRA/DSA level (mailers provided by the patient)    If you have any questions, please call The [facility]plant [facility] at [phone] or [phone].    Please faxall results to [phone].        Jose Naranjo MD  Department of Surgery

## 2017-12-21 LAB
TACROLIMUS BLD-MCNC: 7.5 UG/L (ref 5–15)
TME LAST DOSE: NORMAL H

## 2017-12-22 DIAGNOSIS — Z94.0 DECEASED-DONOR KIDNEY TRANSPLANT: ICD-10-CM

## 2017-12-22 DIAGNOSIS — Z22.7 LATENT TUBERCULOSIS BY BLOOD TEST: ICD-10-CM

## 2017-12-22 DIAGNOSIS — Z48.298 AFTERCARE FOLLOWING ORGAN TRANSPLANT: ICD-10-CM

## 2017-12-22 DIAGNOSIS — Z94.0 KIDNEY REPLACED BY TRANSPLANT: ICD-10-CM

## 2017-12-22 DIAGNOSIS — B25.9 CYTOMEGALOVIRUS (CMV) VIREMIA (H): ICD-10-CM

## 2017-12-22 RX ORDER — VALGANCICLOVIR 450 MG/1
900 TABLET, FILM COATED ORAL DAILY
Qty: 60 TABLET | Refills: 1 | Status: SHIPPED | OUTPATIENT
Start: 2017-12-22 | End: 2018-02-26

## 2017-12-23 NOTE — PROGRESS NOTES
Assessment and Plan:  1. DDKT - baseline Cr ~ 1.1-1.3, which has remained stable.  Minimal proteinuria.  No DSA.  Will make no changes in immunosuppression.  2. HTN - fair control right at target of less than 140/90.  Will increase losartan to 50 mg qHS.  3. Elevated blood glucose - stable fasting glucose running ~ 100-120s.  Will increase ARB.  Recommend weight loss and increased exercise.  4. Secondary renal hyperparathyroidism - mildly elevated PTH, which improved post transplant.  Patient has now stopped calcitriol and slight increase in calcitriol.  Will recheck PTH again at next clinic visit.  5. Vitamin D deficiency - slightly low vitamin D level and will continue on cholecalciferol.  6. Hypophosphatemia - slightly low serum phosphorus level and patient thinks she is off oral phosphorus supplement.  Will continue off oral phosphorus and discussed increased dietary phosphorus.  7. Hypomagnesemia - low normal serum magnesium level and will continue on oral magnesium supplement.  8. Latent TB - patient has finished treatment with INH.  9. Abnormal LFTs - improved, but still elevated LFTs.  Initially this was felt secondary to INH, but now may have been due to CMV hepatitis.  Will continue to follow for now.  10. 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  11. Overweight - recommend increased exercise as able and watch caloric intake.  12. Skin cancer risk - no new skin lesions.  Recommend regular follow up with Dermatology.  13. Night sweats - will check EBV PCR.  CMV PCR is negative.  14. Recommend return visit in 3 months.    Assessment and plan was discussed with patient and she voiced her understanding and agreement.    Reason for Visit:  Ms. Romero is here for routine follow up.    HPI:   Angelica Romero is a 51 year old female with ESKD from unclear etiology and is status post DDKT on 2/8/17.         Transplant Hx:       Tx: DDKT  Date: 2/8/17       " Present Maintenance IS: Tacrolimus and Mycophenolate mofetil       Baseline Creatinine: 1.1-1.3       Recent DSA: No  Date last checked: 8/2017       Biopsy: No    Ms. Romero reports feeling good overall with some medical complaints.  Her energy level is good and pretty much normal, although she reports not sleeping well.  Patient falls asleep easy, but wakes up early and cannot get back to sleep.  She denies feeling tired in the morning or during the day.  She is active, although only gets a little exercise.  Denies any chest pain or shortness of breath with exertion.  Appetite is \"wonderful\" and she is \"never full.\"  Patient's weight is unchanged over the last 3 months, but 15 lbs in the last year.  No nausea, vomiting or diarrhea.  No fever, sweats or chills.  She will get occasional night sweats, but mild and not drenching.  No leg swelling.  No pain or burning with urination.  Patient had issues with left upper extremity swelling at her last clinic visit, likely due to her AV fistula.  She has since had the fistula tied off in September and swelling has resolved.  Patient also notes some burning in her hands and feet at night, which she feels coincides with restarting Valcyte.  No rash or difficulty breathing.    Home BP: 120-140/70-80s.      ROS:   A comprehensive review of systems was obtained and negative, except as noted in the HPI or PMH.    Active Medical Problems:  Patient Active Problem List   Diagnosis     Hypertension secondary to other renal disorders     Obesity, unspecified     Kidney replaced by transplant     Immunosuppressed status (H)     Aftercare following organ transplant     Secondary renal hyperparathyroidism (H)     Vitamin D deficiency     Gastroesophageal reflux disease without esophagitis     Hypophosphatemia     Latent tuberculosis       Personal Hx:  Social History     Social History     Marital status:      Spouse name: N/A     Number of children: N/A     Years of education: " N/A     Occupational History     Not on file.     Social History Main Topics     Smoking status: Former Smoker     Packs/day: 0.20     Years: 15.00     Types: Cigarettes     Quit date: 1/1/2016     Smokeless tobacco: Never Used     Alcohol use 1.8 oz/week     3 Standard drinks or equivalent per week     Drug use: No     Sexual activity: Not on file     Other Topics Concern     Not on file     Social History Narrative       Allergies:  No Known Allergies    Medications:  Prior to Admission medications    Medication Sig Start Date End Date Taking? Authorizing Provider   phosphorus tablet 250 mg (K PHOS NEUTRAL) 250 MG per tablet Take 2 tablets (500 mg) by mouth 2 times daily 3/30/17  Yes Bill Tejeda MD   omeprazole (PRILOSEC) 20 MG CR capsule Take 1 capsule (20 mg) by mouth daily 3/30/17  Yes Bill Tejeda MD   magnesium oxide (MAG-OX) 400 MG tablet Take 2 tablets (800 mg) by mouth 2 times daily 3/7/17  Yes Jose Naranjo MD   valGANciclovir (VALCYTE) 450 MG tablet Take 1 tablet (450 mg) by mouth daily You should take this medication for 6 months total after your transplant. 2/23/17  Yes Bill Tejeda MD   isoniazid (NYDRAZID) 300 MG tablet Take 1 tablet (300 mg) by mouth daily Take for 9 months total. You should have your liver function tests drawn monthly while on this medication. 2/17/17  Yes Thony Montes MD   mycophenolate (CELLCEPT - GENERIC EQUIVALENT) 250 MG capsule Take 3 capsules (750 mg) by mouth 2 times daily 2/17/17  Yes Thony Montes MD   sulfamethoxazole-trimethoprim (BACTRIM/SEPTRA) 400-80 MG per tablet Take 1 tablet by mouth three times a week Take on Monday, Wednesday, and Saturday. The dose of this medication may need to be changed as your kidney function improves (ask your Nephrologist). 2/18/17  Yes Thony Montes MD   tacrolimus (PROGRAF - GENERIC EQUIVALENT) 0.5 MG capsule Take 1 capsule (0.5 mg) by mouth 2 times daily Total dose = 3.5 mg BID 2/17/17  Yes  "Thony Montes MD   tacrolimus (PROGRAF - GENERIC EQUIVALENT) 1 MG capsule Take 3 capsules (3 mg) by mouth 2 times daily Total dose = 3.5 mg BID 2/17/17  Yes Thony Montes MD   ondansetron (ZOFRAN-ODT) 4 MG ODT tab Take 1 tablet (4 mg) by mouth every 6 hours as needed for nausea 2/11/17  Yes Felisha Coy MD   amLODIPine (NORVASC) 10 MG tablet Take 1 tablet (10 mg) by mouth daily 2/11/17  Yes Felisha Coy MD   senna-docusate (SENOKOT-S;PERICOLACE) 8.6-50 MG per tablet Take 1-2 tablets by mouth 2 times daily as needed for constipation 2/11/17  Yes Felisha Coy MD   pyridOXINE (VITAMIN B-6) 25 MG tablet Take 1 tablet (25 mg) by mouth daily 2/11/17  Yes Felisha Coy MD   metoprolol (LOPRESSOR) 50 MG tablet Take 1 tablet (50 mg) by mouth 2 times daily 2/11/17  Yes Felisha Coy MD   polyethylene glycol (MIRALAX) powder Take 17 g by mouth daily as needed.   Yes Reported, Patient       Vitals:  /86 (BP Location: Right arm, Patient Position: Sitting, Cuff Size: Adult Regular)  Pulse 72  Temp 98.6  F (37  C) (Oral)  Ht 1.575 m (5' 2\")  Wt 90.8 kg (200 lb 3.2 oz)  SpO2 96%  BMI 36.62 kg/m2    Exam:   GENERAL APPEARANCE: alert and no distress  HENT: mouth without ulcers or lesions  LYMPHATICS: no cervical or supraclavicular nodes  RESP: lungs clear to auscultation - no rales, rhonchi or wheezes  CV: regular rhythm, normal rate, no rub, no murmur  EDEMA: none to trace LE edema bilaterally  ABDOMEN: soft, nondistended, nontender, bowel sounds normal, overweight  MS: extremities normal - no gross deformities noted, no evidence of inflammation in joints, no muscle tenderness; left upper extremity AV fistula with no thrill  SKIN: no rash  TX KIDNEY: normal    Results:   Recent Results (from the past 168 hour(s))   Basic metabolic panel    Collection Time: 12/18/17  2:37 PM   Result Value Ref Range    Sodium 137 133 - 144 mmol/L    Potassium 3.9 3.4 - 5.3 mmol/L    Chloride 105 94 " - 109 mmol/L    Carbon Dioxide 26 20 - 32 mmol/L    Anion Gap 6 3 - 14 mmol/L    Glucose 146 (H) 70 - 99 mg/dL    Urea Nitrogen 12 7 - 30 mg/dL    Creatinine 1.00 0.52 - 1.04 mg/dL    GFR Estimate 58 (L) >60 mL/min/1.7m2    GFR Estimate If Black 70 >60 mL/min/1.7m2    Calcium 8.9 8.5 - 10.1 mg/dL   CBC with platelets    Collection Time: 12/18/17  2:37 PM   Result Value Ref Range    WBC 2.8 (L) 4.0 - 11.0 10e9/L    RBC Count 4.25 3.8 - 5.2 10e12/L    Hemoglobin 13.5 11.7 - 15.7 g/dL    Hematocrit 40.2 35.0 - 47.0 %    MCV 95 78 - 100 fl    MCH 31.8 26.5 - 33.0 pg    MCHC 33.6 31.5 - 36.5 g/dL    RDW 15.1 (H) 10.0 - 15.0 %    Platelet Count 242 150 - 450 10e9/L   Parathyroid Hormone Intact    Collection Time: 12/18/17  2:37 PM   Result Value Ref Range    Parathyroid Hormone Intact 246 (H) 12 - 72 pg/mL   Vitamin D Deficiency    Collection Time: 12/18/17  2:37 PM   Result Value Ref Range    Vitamin D Deficiency screening 28 20 - 75 ug/L   CMV DNA quantification    Collection Time: 12/18/17  2:37 PM   Result Value Ref Range    CMV DNA Quantitation Specimen Plasma, EDTA anticoagulant     CMV Quant IU/mL CMV DNA Not Detected CMVND^CMV DNA Not Detected [IU]/mL    Log IU/mL of CMVQNT Not Calculated <2.1 [Log_IU]/mL   EBV DNA PCR Quantitative Whole Blood    Collection Time: 12/18/17  2:37 PM   Result Value Ref Range    EBV DNA Copies/mL 1007 (A) EBVNEG^EBV DNA Not Detected [Copies]/mL    EBV DNA Log of Copies 3.0 (H) <2.7 [Log_copies]/mL   Phosphorus    Collection Time: 12/18/17  2:37 PM   Result Value Ref Range    Phosphorus 2.1 (L) 2.5 - 4.5 mg/dL   Magnesium    Collection Time: 12/18/17  2:37 PM   Result Value Ref Range    Magnesium 1.6 1.6 - 2.3 mg/dL   Hepatic panel    Collection Time: 12/18/17  2:37 PM   Result Value Ref Range    Bilirubin Direct <0.1 0.0 - 0.2 mg/dL    Bilirubin Total 0.6 0.2 - 1.3 mg/dL    Albumin 3.6 3.4 - 5.0 g/dL    Protein Total 7.0 6.8 - 8.8 g/dL    Alkaline Phosphatase 108 40 - 150 U/L    ALT  103 (H) 0 - 50 U/L    AST 67 (H) 0 - 45 U/L   IgG    Collection Time: 12/18/17  2:37 PM   Result Value Ref Range    IGG 1050 695 - 1620 mg/dL   Protein  random urine with Creat Ratio    Collection Time: 12/18/17  2:40 PM   Result Value Ref Range    Protein Random Urine 0.39 g/L    Protein Total Urine g/gr Creatinine 0.25 (H) 0 - 0.2 g/g Cr   Creatinine urine calculation only    Collection Time: 12/18/17  2:40 PM   Result Value Ref Range    Creatinine Urine 156 mg/dL   CBC with platelets    Collection Time: 12/19/17 10:36 AM   Result Value Ref Range    WBC Count (External) 2.7 (L) 4.1 - 10.9 x10^3/UL    RBC Count (External) 4.29 3.85 - 5.05 x10^6/uL    Hemoglobin (External) 13.6 11.7 - 15.5 g/dL    Hematocrit (External) 40.1 35.0 - 46.0 %    MCV (External) 93.4 82.0 - 99.0 fL    MCH (External) 31.6 27.0 - 34.0 pg    MCHC (External) 33.9 32.0 - 36.0 g/dL    RDW (External) 16.2 (H) 11.5 - 15.0 %    Platelet Count (External) 248 150 - 450 x10^3/uL   Comprehensive metabolic panel    Collection Time: 12/19/17 10:36 AM   Result Value Ref Range    Sodium (External) 138 133 - 144 mmol/L    Potassium (External) 4.0 3.4 - 5.1 mmol/L    Chloride (External) 104 96 - 109 mmol/L    CO2 (External) 22 21 - 33 mmol/L    Anion Gap (External) 12 2 - 16 mmol/L    Urea Nitrogen (External) 10 6 - 24 mg/dL    Creatinine (External) 1.0 0.4 - 1.0 mg/dL    Glucose (External) 102 (H) 70 - 99 mg/dL    Calcium (External) 9.3 8.2 - 10.0 mg/dL    GFR Estimated (External) 58.2 (L) 60.0 - 150.0 mL/min    Albumin (External) 3.4 3.3 - 5.0 g/dL    Bilirubin Total (External) 0.5 0.4 - 1.4 mg/dL    Bilirubin Direct (External) 0.2 0.0 - 0.3 mg/dL    AST (External) 73 (H) 14 - 41 U/L    Alk Phosphatase (External) 104 45 - 115 IU/L    ALT (External) 107 (H) 7 - 55 U/L    Protein Total (External) 6.7 6.2 - 8.2 g/dL   Tacrolimus level    Collection Time: 12/19/17 10:36 AM   Result Value Ref Range    Tacrolimus Last Dose 12/18/17 [facility]     Tacrolimus Level 7.5  5.0 - 15.0 ug/L   CMV DNA quantification    Collection Time: 12/19/17 10:36 AM   Result Value Ref Range    CMV DNA Quant (External) Undetected Undetected IU/mL    Log IU/ML of CMVQNT (External) Undetected Undetected IU/mL

## 2017-12-26 ENCOUNTER — TELEPHONE (OUTPATIENT)
Dept: TRANSPLANT | Facility: CLINIC | Age: 52
End: 2017-12-26

## 2017-12-26 DIAGNOSIS — Z94.0 KIDNEY REPLACED BY TRANSPLANT: Primary | ICD-10-CM

## 2017-12-26 DIAGNOSIS — Z48.298 AFTERCARE FOLLOWING ORGAN TRANSPLANT: ICD-10-CM

## 2017-12-27 ENCOUNTER — RESULTS ONLY (OUTPATIENT)
Dept: OTHER | Facility: CLINIC | Age: 52
End: 2017-12-27

## 2017-12-27 DIAGNOSIS — Z48.298 AFTERCARE FOLLOWING ORGAN TRANSPLANT: ICD-10-CM

## 2017-12-27 DIAGNOSIS — Z94.0 KIDNEY REPLACED BY TRANSPLANT: ICD-10-CM

## 2017-12-27 PROCEDURE — 86833 HLA CLASS II HIGH DEFIN QUAL: CPT | Performed by: TRANSPLANT SURGERY

## 2017-12-27 PROCEDURE — 86832 HLA CLASS I HIGH DEFIN QUAL: CPT | Performed by: TRANSPLANT SURGERY

## 2017-12-27 PROCEDURE — 80197 ASSAY OF TACROLIMUS: CPT | Performed by: INTERNAL MEDICINE

## 2017-12-29 LAB
TACROLIMUS BLD-MCNC: 4.8 UG/L (ref 5–15)
TME LAST DOSE: ABNORMAL H

## 2018-01-02 LAB — PRA DONOR SPECIFIC ABY: NORMAL

## 2018-01-09 ENCOUNTER — RESULTS ONLY (OUTPATIENT)
Dept: OTHER | Facility: CLINIC | Age: 53
End: 2018-01-09

## 2018-01-09 DIAGNOSIS — Z94.0 KIDNEY REPLACED BY TRANSPLANT: ICD-10-CM

## 2018-01-09 DIAGNOSIS — Z48.298 AFTERCARE FOLLOWING ORGAN TRANSPLANT: ICD-10-CM

## 2018-01-09 PROCEDURE — 80197 ASSAY OF TACROLIMUS: CPT | Performed by: INTERNAL MEDICINE

## 2018-01-09 PROCEDURE — 86832 HLA CLASS I HIGH DEFIN QUAL: CPT | Performed by: TRANSPLANT SURGERY

## 2018-01-09 PROCEDURE — 86833 HLA CLASS II HIGH DEFIN QUAL: CPT | Performed by: TRANSPLANT SURGERY

## 2018-01-11 LAB
TACROLIMUS BLD-MCNC: 6.4 UG/L (ref 5–15)
TME LAST DOSE: NORMAL H

## 2018-01-12 LAB — PRA DONOR SPECIFIC ABY: NORMAL

## 2018-01-13 ENCOUNTER — TELEPHONE (OUTPATIENT)
Dept: TRANSPLANT | Facility: CLINIC | Age: 53
End: 2018-01-13

## 2018-01-13 DIAGNOSIS — Z94.0 KIDNEY TRANSPLANTED: Primary | ICD-10-CM

## 2018-01-17 ENCOUNTER — TELEPHONE (OUTPATIENT)
Dept: GASTROENTEROLOGY | Facility: CLINIC | Age: 53
End: 2018-01-17

## 2018-01-17 LAB
CW4: 509
DONOR IDENTIFICATION: NORMAL
DR52: 754
DSA COMMENTS: NORMAL
DSA PRESENT: YES
DSA TEST METHOD: NORMAL
ORGAN: NORMAL
SA1 CELL: NORMAL
SA1 COMMENTS: NORMAL
SA1 HI RISK ABY: NORMAL
SA1 MOD RISK ABY: NORMAL
SA1 TEST METHOD: NORMAL
SA2 CELL: NORMAL
SA2 COMMENTS: NORMAL
SA2 HI RISK ABY UA: NORMAL
SA2 MOD RISK ABY: NORMAL
SA2 TEST METHOD: NORMAL
UNOS CPRA: 68

## 2018-01-17 NOTE — TELEPHONE ENCOUNTER
I received an [employer] request to schedule pt for hepatology appt. I attempted to contact her and reached her VM. I LM with my name, number and hours

## 2018-01-22 LAB
DONOR IDENTIFICATION: NORMAL
DR52: 694
DSA COMMENTS: NORMAL
DSA PRESENT: YES
DSA TEST METHOD: NORMAL
ORGAN: NORMAL
SA1 CELL: NORMAL
SA1 COMMENTS: NORMAL
SA1 HI RISK ABY: NORMAL
SA1 MOD RISK ABY: NORMAL
SA1 TEST METHOD: NORMAL
SA2 CELL: NORMAL
SA2 COMMENTS: NORMAL
SA2 HI RISK ABY UA: NORMAL
SA2 MOD RISK ABY: NORMAL
SA2 TEST METHOD: NORMAL

## 2018-01-24 DIAGNOSIS — Z94.0 KIDNEY REPLACED BY TRANSPLANT: ICD-10-CM

## 2018-01-24 DIAGNOSIS — Z48.298 AFTERCARE FOLLOWING ORGAN TRANSPLANT: ICD-10-CM

## 2018-01-24 PROCEDURE — 80197 ASSAY OF TACROLIMUS: CPT | Performed by: INTERNAL MEDICINE

## 2018-01-26 DIAGNOSIS — R94.5 ABNORMAL RESULTS OF LIVER FUNCTION STUDIES: ICD-10-CM

## 2018-01-26 DIAGNOSIS — R74.8 ELEVATED LIVER ENZYMES: Primary | ICD-10-CM

## 2018-01-26 LAB
TACROLIMUS BLD-MCNC: 10.5 UG/L (ref 5–15)
TME LAST DOSE: NORMAL H

## 2018-01-30 NOTE — TELEPHONE ENCOUNTER
Bill Tejeda MD Huepfel, Nereida CUNNINGHAM RN                   I talked to Dr. Kirk.  They will try to get her into Hepatology within the next week.     Thanks.

## 2018-01-30 NOTE — TELEPHONE ENCOUNTER
2nd Issue Prograf tacrolimus level 10.5 above goal level   Plan   Please confirm 12 hour trough Prograf tacrolimus level  Previous levels within goal level   Please confirm taking  Prograf tacrolimus 1.5 mg twice per day   Lower Prograf tacrolimus 1.0 mg twice per day

## 2018-01-31 NOTE — TELEPHONE ENCOUNTER
Spoke to patient regarding tac level = 10.5, above goal.  Patient confirmed current dose = 1.5 mg BID but states that this was NOT a good 12 hour trough level.  No dose change at this time.  Patient will repeat labs 2/6/2018.

## 2018-02-05 ENCOUNTER — OFFICE VISIT (OUTPATIENT)
Dept: GASTROENTEROLOGY | Facility: CLINIC | Age: 53
End: 2018-02-05
Attending: INTERNAL MEDICINE
Payer: MEDICARE

## 2018-02-05 VITALS
DIASTOLIC BLOOD PRESSURE: 88 MMHG | HEIGHT: 63 IN | RESPIRATION RATE: 18 BRPM | WEIGHT: 208.6 LBS | SYSTOLIC BLOOD PRESSURE: 139 MMHG | BODY MASS INDEX: 36.96 KG/M2 | HEART RATE: 87 BPM

## 2018-02-05 DIAGNOSIS — R74.01 NONSPECIFIC ELEVATION OF LEVELS OF TRANSAMINASE AND LACTIC ACID DEHYDROGENASE (LDH): ICD-10-CM

## 2018-02-05 DIAGNOSIS — E88.01 ALPHA-1-ANTITRYPSIN DEFICIENCY (H): ICD-10-CM

## 2018-02-05 DIAGNOSIS — R79.9 ABNORMAL FINDING OF BLOOD CHEMISTRY: ICD-10-CM

## 2018-02-05 DIAGNOSIS — R74.02 NONSPECIFIC ELEVATION OF LEVELS OF TRANSAMINASE AND LACTIC ACID DEHYDROGENASE (LDH): ICD-10-CM

## 2018-02-05 DIAGNOSIS — R74.8 ELEVATED LIVER ENZYMES: Primary | ICD-10-CM

## 2018-02-05 ASSESSMENT — PAIN SCALES - GENERAL: PAINLEVEL: NO PAIN (0)

## 2018-02-05 NOTE — PROGRESS NOTES
Hepatology Clinic note  nAgelica Romero   Date of Birth 1965  Date of Service 2/5/2018  Reason for consult: Dr. Tejeda  Requesting provider: elevated liver enzymes         Impression and plan:   Angelica Romero is a 52 year old female with obesity, HTN, and ESRD s/p DDKD on 2/8/2017 who presents for evaluation of elevated liver enzymes.    Enzymes elevation pattern is hepatocellular, without synthetic dysfunction.   Common viral hepatitis have ruled out.   Likely multifactorial: perhaps initially due to INH use, overlapping with CMV viremia, but most likely an element of LOPEZ based on weight gain over few months, and co-morbid conditions associated with LOPEZ.   - Will complete evaluation by checking serology for common causes of chronic enzyme elevation: including iron panel, A1ATD, autoimmune markers, celiac, as well as US with doppler.   - Depending on results may consider liver biopsy for further evaluation.   - In the mean time, she intends on implementing life style changes to results in weight loss.     No other changes to her management at this time.     RTC in July or sooner as needed    Christina Santos MD  [facility] Hepatology [facility]    -----------------------------------------------------       HPI:   Angelica Romero is a 52 year old female with obesity, HTN, and ESRD s/p DDKD on 2/8/2017 who presents for evaluation of elevated liver enzymes.      Briefly, Mrs. Romero has no prior history of chronic liver disease. She had CKD for at least 8 years, and in 11/2011 she was started on HD, until transplantation. She was known to have latent TB and started on INH x 9 months shortly after transplantation.   In 5/2017 she developed mild elevation of AST/ALT, initially felt to be due to INH and were simply monitored. Synthetic function was normal. In 11/2017 appears the enzymes were more elevated, and that's when discovered to have positive CMV DNA where previously she was negative. She was  started on valcyte and gradually the CMV DNA was undetected. However, the liver enzyme continued to be elevated.   During this period of time, she reports to be feeling well without significant issues. Feels her life is much better after transplantation.   Also, she notes weight gain: ~ 20 lbs, majority of it happened over the last 6 months.   Otherwise, denies alcohol abuse, or OTC meds/supplements.     Denies chest pain, shortness of breath, abdominal pain, nausea, vomiting fevers, chills, bleeding, jaundice, confusion, falls, rash, pruritis, leg swelling or abdominal distention. Has stable weight, appetite and bowel habits.  No family history of liver disease.          Past Medical History:     Past Medical History:   Diagnosis Date     Cervical dysplasia      End stage kidney disease (H)      Hypertension      Hypertensive nephrosclerosis      Obesity, unspecified      Patient Active Problem List   Diagnosis     Hypertension secondary to other renal disorders     Obesity, unspecified     Kidney replaced by transplant     Immunosuppressed status (H)     Aftercare following organ transplant     Secondary renal hyperparathyroidism (H)     Vitamin D deficiency     Gastroesophageal reflux disease without esophagitis     Hypophosphatemia     Latent tuberculosis            Past Surgical History:     Past Surgical History:   Procedure Laterality Date     AV FISTULA OR GRAFT ARTERIAL  ~    left, complicated by subclavian steal syndrome with graft placed in neck     HYSTERECTOMY  2012     right breast lumpectomy  2016    benign     TRANSPLANT KIDNEY RECIPIENT  DONOR N/A 2017    Procedure: TRANSPLANT KIDNEY RECIPIENT  DONOR;  Surgeon: Jose Naranjo MD;  Location:  OR            Medications:     Current Outpatient Prescriptions   Medication     valGANciclovir (VALCYTE) 450 MG tablet     losartan (COZAAR) 50 MG tablet     omeprazole (PRILOSEC) 20 MG CR capsule     tacrolimus (PROGRAF -  "GENERIC EQUIVALENT) 0.5 MG capsule     tacrolimus (PROGRAF - GENERIC EQUIVALENT) 1 MG capsule     cholecalciferol (VITAMIN D) 1000 UNIT tablet     magnesium oxide (MAG-OX) 400 MG tablet     mycophenolate (CELLCEPT - GENERIC EQUIVALENT) 250 MG capsule     sulfamethoxazole-trimethoprim (BACTRIM/SEPTRA) 400-80 MG per tablet     amLODIPine (NORVASC) 10 MG tablet     metoprolol (LOPRESSOR) 50 MG tablet     No current facility-administered medications for this visit.             Allergies:   No Known Allergies         Social History:      reports that she quit smoking about 2 years ago. Her smoking use included Cigarettes. She has a 3.00 pack-year smoking history. She has never used smokeless tobacco. She reports that she drinks about 1.8 oz of alcohol per week  She reports that she does not use illicit drugs.   has no sexual activity history on file.           Family History:     Family History   Problem Relation Age of Onset     Hypertension Maternal Grandmother      DIABETES Maternal Grandmother      Cardiovascular Maternal Grandmother      Ovarian Cancer Maternal Grandmother      Anesthesia Reaction Paternal Grandmother      Hypertension Mother      Arthritis Mother      KIDNEY DISEASE Cousin      on HD, unknown cause            Review of Systems:   10 points ROS was obtained and highlighted in the HPI, otherwise negative.          Physical Exam:   VS:  /88 (Cuff Size: Adult Large)  Pulse 87  Resp 18  Ht 1.608 m (5' 3.3\")  Wt 94.6 kg (208 lb 9.6 oz)  BMI 36.6 kg/m2      Gen: A&Ox3, NAD  HEENT: non-icteric, oropharynx clear  CV: RRR  Lung: CTAB  Abd: soft, NT, ND  Ext: no edema, intact pulses.   Skin: no stigmata of chronic liver disease.   Neuro: no focal deficits, grossly intact, no asterixis   Psych: appropriate mood and affects       Data:   Reviewed in person and significant for:  Lab Results   Component Value Date    WBC 2.8 12/18/2017     Lab Results   Component Value Date    RBC 4.25 12/18/2017 "     Lab Results   Component Value Date    HGB 13.5 12/18/2017     Lab Results   Component Value Date    HCT 40.2 12/18/2017     No components found for: MCT  Lab Results   Component Value Date    MCV 95 12/18/2017     Lab Results   Component Value Date    MCH 31.8 12/18/2017     Lab Results   Component Value Date    MCHC 33.6 12/18/2017     Lab Results   Component Value Date    RDW 15.1 12/18/2017     Lab Results   Component Value Date     12/18/2017     Last Basic Metabolic Panel:  Lab Results   Component Value Date     12/18/2017      Lab Results   Component Value Date    POTASSIUM 3.9 12/18/2017     Lab Results   Component Value Date    CHLORIDE 105 12/18/2017     Lab Results   Component Value Date    GIOVANNA 8.9 12/18/2017     Lab Results   Component Value Date    CO2 26 12/18/2017     Lab Results   Component Value Date    BUN 12 12/18/2017     Lab Results   Component Value Date    CR 1.00 12/18/2017     Lab Results   Component Value Date     12/18/2017     Liver Function Studies -   Recent Labs   Lab Test  12/18/17   1437   PROTTOTAL  7.0   ALBUMIN  3.6   BILITOTAL  0.6   ALKPHOS  108   AST  67*   ALT  103*

## 2018-02-05 NOTE — LETTER
2/5/2018       [facility] [facility] [facility] [address]     Dear Colleague,    Thank you for referring your patient, Angelica Romero, to the [facility] HEPATOLOGY at [facility]. Please see a copy of my visit note below.    Hepatology Clinic note  Angelica Romero   Date of Birth 1965  Date of Service 2/5/2018  Reason for consult: Dr. Tejeda  Requesting provider: elevated liver enzymes         Impression and plan:   Angelica Romero is a 52 year old female with obesity, HTN, and ESRD s/p DDKD on 2/8/2017 who presents for evaluation of elevated liver enzymes.    Enzymes elevation pattern is hepatocellular, without synthetic dysfunction.   Common viral hepatitis have ruled out.   Likely multifactorial: perhaps initially due to INH use, overlapping with CMV viremia, but most likely an element of LOPEZ based on weight gain over few months, and co-morbid conditions associated with LOPEZ.   - Will complete evaluation by checking serology for common causes of chronic enzyme elevation: including iron panel, A1ATD, autoimmune markers, celiac, as well as US with doppler.   - Depending on results may consider liver biopsy for further evaluation.   - In the mean time, she intends on implementing life style changes to results in weight loss.     No other changes to her management at this time.     RTC in July or sooner as needed    Christina Santos MD  [facility] Transplant Hepatology clinic    -----------------------------------------------------       HPI:   Angelica Romero is a 52 year old female with obesity, HTN, and ESRD s/p DDKD on 2/8/2017 who presents for evaluation of elevated liver enzymes.      Briefly, Mrs. Romero has no prior history of chronic liver disease. She had CKD for at least 8 years, and in 11/2011 she was started on HD, until transplantation. She was known to have latent TB and started on INH x 9 months shortly after transplantation.   In 5/2017 she developed  mild elevation of AST/ALT, initially felt to be due to INH and were simply monitored. Synthetic function was normal. In 2017 appears the enzymes were more elevated, and that's when discovered to have positive CMV DNA where previously she was negative. She was started on valcyte and gradually the CMV DNA was undetected. However, the liver enzyme continued to be elevated.   During this period of time, she reports to be feeling well without significant issues. Feels her life is much better after transplantation.   Also, she notes weight gain: ~ 20 lbs, majority of it happened over the last 6 months.   Otherwise, denies alcohol abuse, or OTC meds/supplements.     Denies chest pain, shortness of breath, abdominal pain, nausea, vomiting fevers, chills, bleeding, jaundice, confusion, falls, rash, pruritis, leg swelling or abdominal distention. Has stable weight, appetite and bowel habits.  No family history of liver disease.          Past Medical History:     Past Medical History:   Diagnosis Date     Cervical dysplasia      End stage kidney disease (H)      Hypertension      Hypertensive nephrosclerosis      Obesity, unspecified      Patient Active Problem List   Diagnosis     Hypertension secondary to other renal disorders     Obesity, unspecified     Kidney replaced by transplant     Immunosuppressed status (H)     Aftercare following organ transplant     Secondary renal hyperparathyroidism (H)     Vitamin D deficiency     Gastroesophageal reflux disease without esophagitis     Hypophosphatemia     Latent tuberculosis            Past Surgical History:     Past Surgical History:   Procedure Laterality Date     AV FISTULA OR GRAFT ARTERIAL  ~    left, complicated by subclavian steal syndrome with graft placed in neck     HYSTERECTOMY  2012     right breast lumpectomy  2016    benign     TRANSPLANT KIDNEY RECIPIENT  DONOR N/A 2017    Procedure: TRANSPLANT KIDNEY RECIPIENT  DONOR;   "Surgeon: Jose Naranjo MD;  Location: [facility] OR            Medications:     Current Outpatient Prescriptions   Medication     valGANciclovir (VALCYTE) 450 MG tablet     losartan (COZAAR) 50 MG tablet     omeprazole (PRILOSEC) 20 MG CR capsule     tacrolimus (PROGRAF - GENERIC EQUIVALENT) 0.5 MG capsule     tacrolimus (PROGRAF - GENERIC EQUIVALENT) 1 MG capsule     cholecalciferol (VITAMIN D) 1000 UNIT tablet     magnesium oxide (MAG-OX) 400 MG tablet     mycophenolate (CELLCEPT - GENERIC EQUIVALENT) 250 MG capsule     sulfamethoxazole-trimethoprim (BACTRIM/SEPTRA) 400-80 MG per tablet     amLODIPine (NORVASC) 10 MG tablet     metoprolol (LOPRESSOR) 50 MG tablet     No current facility-administered medications for this visit.             Allergies:   No Known Allergies         Social History:      reports that she quit smoking about 2 years ago. Her smoking use included Cigarettes. She has a 3.00 pack-year smoking history. She has never used smokeless tobacco. She reports that she drinks about 1.8 oz of alcohol per week  She reports that she does not use illicit drugs.   has no sexual activity history on file.           Family History:     Family History   Problem Relation Age of Onset     Hypertension Maternal Grandmother      DIABETES Maternal Grandmother      Cardiovascular Maternal Grandmother      Ovarian Cancer Maternal Grandmother      Anesthesia Reaction Paternal Grandmother      Hypertension Mother      Arthritis Mother      KIDNEY DISEASE Cousin      on HD, unknown cause            Review of Systems:   10 points ROS was obtained and highlighted in the HPI, otherwise negative.          Physical Exam:   VS:  /88 (Cuff Size: Adult Large)  Pulse 87  Resp 18  Ht 1.608 m (5' 3.3\")  Wt 94.6 kg (208 lb 9.6 oz)  BMI 36.6 kg/m2      Gen: A&Ox3, NAD  HEENT: non-icteric, oropharynx clear  CV: RRR  Lung: CTAB  Abd: soft, NT, ND  Ext: no edema, intact pulses.   Skin: no stigmata of chronic liver disease. "   Neuro: no focal deficits, grossly intact, no asterixis   Psych: appropriate mood and affects       Data:   Reviewed in person and significant for:  Lab Results   Component Value Date    WBC 2.8 12/18/2017     Lab Results   Component Value Date    RBC 4.25 12/18/2017     Lab Results   Component Value Date    HGB 13.5 12/18/2017     Lab Results   Component Value Date    HCT 40.2 12/18/2017     No components found for: MCT  Lab Results   Component Value Date    MCV 95 12/18/2017     Lab Results   Component Value Date    MCH 31.8 12/18/2017     Lab Results   Component Value Date    MCHC 33.6 12/18/2017     Lab Results   Component Value Date    RDW 15.1 12/18/2017     Lab Results   Component Value Date     12/18/2017     Last Basic Metabolic Panel:  Lab Results   Component Value Date     12/18/2017      Lab Results   Component Value Date    POTASSIUM 3.9 12/18/2017     Lab Results   Component Value Date    CHLORIDE 105 12/18/2017     Lab Results   Component Value Date    GIOVANNA 8.9 12/18/2017     Lab Results   Component Value Date    CO2 26 12/18/2017     Lab Results   Component Value Date    BUN 12 12/18/2017     Lab Results   Component Value Date    CR 1.00 12/18/2017     Lab Results   Component Value Date     12/18/2017     Liver Function Studies -   Recent Labs   Lab Test  12/18/17   1437   PROTTOTAL  7.0   ALBUMIN  3.6   BILITOTAL  0.6   ALKPHOS  108   AST  67*   ALT  103*       Again, thank you for allowing me to participate in the care of your patient.      Sincerely,    Christina Santos MD

## 2018-02-05 NOTE — MR AVS SNAPSHOT
After Visit Summary   2/5/2018    Angelica Romero    MRN: [id number]           Patient Information     Date Of Birth          1965        Visit Information        Provider Department      2/5/2018 10:00 AM Christina Santos MD [facility] Hepatology        Today's Diagnoses     Elevated liver enzymes    -  1    Alpha-1-antitrypsin deficiency (H)         Abnormal finding of blood chemistry         Nonspecific elevation of levels of transaminase and lactic acid dehydrogenase (LDH)            Follow-ups after your visit        Your next 10 appointments already scheduled     Jul 31, 2018 11:30 AM CDT   (Arrive by 11:15 AM)   Return General Liver with Christina Santos MD   [facility] Hepatology ([facility])    [address] [place] [address]  [address]  [facility] [address]   [phone]            Jul 31, 2018  3:35 PM CDT   (Arrive by 3:05 PM)   Return Kidney Transplant with Bill Tejeda MD   [facility] Nephrology ([facility])    [address] [place] [address]  [address]  [facility] [address]   [phone]              Future tests that were ordered for you today     Open Future Orders        Priority Expected Expires Ordered    Hepatitis B Surface Antibody Routine  6/5/2018 2/5/2018    Hepatitis B core antibody Routine  6/5/2018 2/5/2018    Hepatitis B surface antigen Routine  6/5/2018 2/5/2018    F Actin EIA with reflex Routine  5/5/2018 2/5/2018    Immunoglobulins A G and M Routine  5/5/2018 2/5/2018    Mitochondrial M2 Antibody IgG Routine  5/5/2018 2/5/2018    Alpha 1 antitryp marcos reflex to pheno Routine  5/5/2018 2/5/2018    Tissue transglutaminase emerald IgA and IgG Routine  5/5/2018 2/5/2018    IRON Routine  5/5/2018 2/5/2018    IRON AND IRON BINDING CAPACITY Routine  5/5/2018 2/5/2018    FERRITIN Routine  5/5/2018 2/5/2018    Hemoglobin A1c Routine  5/5/2018 2/5/2018    Lipid Profile Routine  5/5/2018 2/5/2018    Hepatitis Antibody A IgG Routine  5/5/2018  "2/5/2018    US Abdomen/Pelvis Duplex Complete Routine  5/5/2018 2/5/2018            Who to contact     If you have questions or need follow up information about today's clinic visit or your schedule please contact [facility] HEPATOLOGY directly at [phone].  Normal or non-critical lab and imaging results will be communicated to you by MyChart, letter or phone within 4 business days after the clinic has received the results. If you do not hear from us within 7 days, please contact the clinic through [employer] or phone. If you have a critical or abnormal lab result, we will notify you by phone as soon as possible.  Submit refill requests through [employer] or call your pharmacy and they will forward the refill request to us. Please allow 3 business days for your refill to be completed.          Additional Information About Your Visit        [employer] Information     [employer] gives you secure access to your electronic health record. If you see a primary care provider, you can also send messages to your care team and make appointments. If you have questions, please call your primary care clinic.  If you do not have a primary care provider, please call [phone] and they will assist you.        Care EveryWhere ID     This is your Care EveryWhere ID. This could be used by other organizations to access your [place] medical records  [id number]        Your Vitals Were     Pulse Respirations Height BMI (Body Mass Index)          87 18 1.608 m (5' 3.3\") 36.6 kg/m2         Blood Pressure from Last 3 Encounters:   02/05/18 139/88   12/18/17 148/86   09/12/17 126/79    Weight from Last 3 Encounters:   02/05/18 94.6 kg (208 lb 9.6 oz)   12/18/17 90.8 kg (200 lb 3.2 oz)   09/12/17 90.9 kg (200 lb 6.4 oz)               Primary Care Provider Office Phone # Fax #    Harry Laboy [phone]       [address] [address]        Equal Access to Services     MALVIN MCGEE AH: " Hadii aad ku hadlidiao Sodilshadali, waaxda luqadaha, qaybta kaalmada mauricio, hi alysejacqui mcfarlane. So [facility] [phone].    ATENCIÓN: Si po hays, tiene a park disposición servicios gratuitos de asistencia lingüística. Llame al [phone].    We comply with applicable federal civil rights laws and [place] laws. We do not discriminate on the basis of race, color, national origin, age, disability, sex, sexual orientation, or gender identity.            Thank you!     Thank you for choosing [facility] HEPATOLOGY  for your care. Our goal is always to provide you with excellent care. Hearing back from our patients is one way we can continue to improve our services. Please take a few minutes to complete the written survey that you may receive in the mail after your visit with us. Thank you!             Your Updated Medication List - Protect others around you: Learn how to safely use, store and throw away your medicines at www.disposemymeds.org.          This list is accurate as of 18 11:13 AM.  Always use your most recent med list.                   Brand Name Dispense Instructions for use Diagnosis    amLODIPine 10 MG tablet    NORVASC    30 tablet    Take 1 tablet (10 mg) by mouth daily    Essential hypertension       cholecalciferol 1000 UNIT tablet    vitamin D3    60 tablet    Take 2 tablets (2,000 Units) by mouth daily    -donor kidney transplant, Latent tuberculosis by blood test, Aftercare following organ transplant, Kidney replaced by transplant       losartan 50 MG tablet    COZAAR    90 tablet    Take 1 tablet (50 mg) by mouth At Bedtime    Hypertension secondary to other renal disorders       magnesium oxide 400 MG tablet    MAG-OX    120 tablet    Take 2 tablets (800 mg) by mouth 2 times daily    Hypomagnesemia       metoprolol tartrate 50 MG tablet    LOPRESSOR    60 tablet    Take 1 tablet (50 mg) by mouth 2 times daily    Essential hypertension       mycophenolate 250 MG  capsule    GENERIC EQUIVALENT    180 capsule    Take 3 capsules (750 mg) by mouth 2 times daily    -donor kidney transplant, Latent tuberculosis by blood test, Aftercare following organ transplant, Kidney replaced by transplant       omeprazole 20 MG CR capsule    priLOSEC    30 capsule    TAKE ONE CAPSULE BY MOUTH ONCE DAILY    Gastroesophageal reflux disease without esophagitis       sulfamethoxazole-trimethoprim 400-80 MG per tablet    BACTRIM/SEPTRA    12 tablet    Take 1 tablet by mouth three times a week Take on Monday, Wednesday, and Saturday. The dose of this medication may need to be changed as your kidney function improves (ask your Nephrologist).    -donor kidney transplant, Latent tuberculosis by blood test, Aftercare following organ transplant, Kidney replaced by transplant       * tacrolimus 0.5 MG capsule    GENERIC EQUIVALENT    60 capsule    Take 1 capsule (0.5 mg) by mouth 2 times daily Total dose = 1.5 mg BID    Aftercare following organ transplant, Kidney replaced by transplant, Latent tuberculosis by blood test, -donor kidney transplant       * tacrolimus 1 MG capsule    GENERIC EQUIVALENT    60 capsule    Take 1 capsule (1 mg) by mouth 2 times daily Total dose = 1.5 mg BID    -donor kidney transplant, Latent tuberculosis by blood test, Aftercare following organ transplant, Kidney replaced by transplant       valGANciclovir 450 MG tablet    VALCYTE    60 tablet    Take 2 tablets (900 mg) by mouth daily    Cytomegalovirus (CMV) viremia (H)       * Notice:  This list has 2 medication(s) that are the same as other medications prescribed for you. Read the directions carefully, and ask your doctor or other care provider to review them with you.

## 2018-02-05 NOTE — NURSING NOTE
Chief Complaint   Patient presents with     Consult     Angelica is here today to be seen for her increased LFT.        Vitals:    02/05/18 0944   BP: 139/88   Cuff Size: Adult Large   Pulse: 87   Resp: 18       There is no height or weight on file to calculate BMI.

## 2018-02-06 ENCOUNTER — TRANSFERRED RECORDS (OUTPATIENT)
Dept: HEALTH INFORMATION MANAGEMENT | Facility: CLINIC | Age: 53
End: 2018-02-06

## 2018-02-06 DIAGNOSIS — Z94.0 KIDNEY REPLACED BY TRANSPLANT: ICD-10-CM

## 2018-02-06 DIAGNOSIS — Z48.298 AFTERCARE FOLLOWING ORGAN TRANSPLANT: ICD-10-CM

## 2018-02-06 PROCEDURE — 80197 ASSAY OF TACROLIMUS: CPT | Performed by: INTERNAL MEDICINE

## 2018-02-07 ENCOUNTER — EXTERNAL ORDER RESULTS (OUTPATIENT)
Dept: GASTROENTEROLOGY | Facility: CLINIC | Age: 53
End: 2018-02-07

## 2018-02-07 LAB
% SATURATION - QUEST: 37
FERRITIN SERPL-MCNC: 3499 NG/ML (ref 26–252)
HAV IGM SER QL: NORMAL
HBA1C MFR BLD: 5.3 % (ref 4–6)
HBV SURFACE AG SERPL QL IA: NORMAL
HEP B CORE AB, IGM: NORMAL
HEP B SURFACE ANTIGEN: NORMAL
IRON: 96 UG/DL
Lab: 149
TIBC - QUEST: 260

## 2018-02-08 DIAGNOSIS — Z53.9 ERRONEOUS ENCOUNTER--DISREGARD: Primary | ICD-10-CM

## 2018-02-09 ENCOUNTER — TELEPHONE (OUTPATIENT)
Dept: PHARMACY | Facility: CLINIC | Age: 53
End: 2018-02-09

## 2018-02-11 LAB
TACROLIMUS BLD-MCNC: 8.9 UG/L (ref 5–15)
TME LAST DOSE: NORMAL H

## 2018-02-12 DIAGNOSIS — Z94.0 DECEASED-DONOR KIDNEY TRANSPLANT: ICD-10-CM

## 2018-02-12 DIAGNOSIS — Z22.7 LATENT TUBERCULOSIS BY BLOOD TEST: ICD-10-CM

## 2018-02-12 DIAGNOSIS — Z48.298 AFTERCARE FOLLOWING ORGAN TRANSPLANT: ICD-10-CM

## 2018-02-12 DIAGNOSIS — Z94.0 KIDNEY REPLACED BY TRANSPLANT: Primary | ICD-10-CM

## 2018-02-12 NOTE — TELEPHONE ENCOUNTER
Spoke to patient regarding tac level = 8.9, above goal.  Patient confirms current dose and good 12 hour trough level.  Patient verbalizes understanding of medication dose decrease to 1 mg BID.  Updated current standing lab orders to include LFT's.  Patient states that she missed her scan appt. Last week but will call to reschedule.  Reviewed independence with medication management, reviewed how to request medication refills.  Reviewed current standing lab orders.  Patient confirms f/u appt. With Dr. Tejeda.  Confirmed patients current PCP.  Patient confirms copy of txp handbook.  Mailed copy of current standing lab orders to patient.  Reviewed when to call txp center and/or when to call PCP.  No further questions/concerns at this time.

## 2018-02-12 NOTE — LETTER
OUTPATIENT LABORATORY TEST ORDER    Patient Name:Angelica Romero   Transplant Date: 2/8/2017  YOB: 1965  Issue Date & Time:2/12/2018  2:26 PM  [facility] MR: [id number]  Expiration Date:  (1 year after date issued)      Diagnoses: Aftercare following organ transplant (ICD-10  Z48.288)   Kidney Transplant (ICD-10  Z94.0)   Long term use of medications (ICD-10  Z79.899)     ?Lab results to be available on the same day drawn.   ?Patient should release information to the [facility], [facility].     ?Please fax to the Transplant Center at [phone].    Monthly   ?Hemogram and Platelet   ?Basic Metabolic Panel (Sodium, Potassium,Chloride, CO2, Creatinine, Urea Nitrogen, Glucose, Calcium)   ?Prograf/Tacrolimus drug level    ? CMV PCR QT   ? EBV PCR QT   ? LFT'S    Every 3 months    ? BK PCR Quantitative (Polyoma virus - blood in purple top tube to Reference Lab)     Every 6 months   ? Random Urine for Protein/Creatinineratio                 ? PRA/DSA level (mailers provided by the patient)    If you have any questions, please call The Transplant Center at [phone] or [phone].    Please faxall results to [phone].        Jose Naranjo MD  Department of Surgery

## 2018-02-12 NOTE — TELEPHONE ENCOUNTER
"Issue Prograf tacrolimus level  8.9 above goal level  1 year post transplant    Plan   Call confirm current dose of Prograf tacrolimus 1.5 mg twice per day   Call confirm 12 hour trough Prograf tacrolimus level   IF the above is accurate   Lower Prograf tacrolimus to 1.0 mg twice per day       1 year post transplant  2/8/2018   One Year post kidney transplant general fp;;pw I[    Confirm independent with medication set up - confirm if any history  miss medications during the first year   Review transplant   surveillance labs  are now needed once per month until 3 years post transplant  Then every 2 months after 3 years  Then every 3 months for life time    -Call the transplant office for  updated lab surveillance orders   Review \"how to make\"annual follow up visits at the  Transplant  Nephrology   Confirm PCP -   Confirm reviewed handbook  In the last year   Confirm following transplant labs -creatinine value is recommend     Review when to contact transplant office immediately  -   out of immunosuppression medications   or graft dysfunction of the  kidney transplant increase creatinine   General principles when to notify the transplant  Office   Prolonged illness that due do not resolve after PCP,frequent UTI ,Cancer, Influenza, shingles- herpes infections ,EBV ,CMV  Hospitalizations   or increase creatinine /pain over the kidney transplant     Transplant patients ER Urgent Care  -severe dehydration , unable to void ,   fevers  Or other medical emergency   "

## 2018-02-13 DIAGNOSIS — K21.9 GASTROESOPHAGEAL REFLUX DISEASE WITHOUT ESOPHAGITIS: ICD-10-CM

## 2018-02-15 RX ORDER — TACROLIMUS 1 MG/1
1 CAPSULE ORAL 2 TIMES DAILY
Qty: 60 CAPSULE | Refills: 11 | Status: SHIPPED | OUTPATIENT
Start: 2018-02-15 | End: 2019-02-21

## 2018-02-15 RX ORDER — TACROLIMUS 0.5 MG/1
CAPSULE ORAL
Qty: 60 CAPSULE | Refills: 11 | Status: SHIPPED
Start: 2018-02-15 | End: 2023-11-13

## 2018-02-20 DIAGNOSIS — Z48.298 AFTERCARE FOLLOWING ORGAN TRANSPLANT: ICD-10-CM

## 2018-02-20 DIAGNOSIS — Z94.0 KIDNEY REPLACED BY TRANSPLANT: ICD-10-CM

## 2018-02-20 DIAGNOSIS — B25.9 CYTOMEGALOVIRUS (CMV) VIREMIA (H): ICD-10-CM

## 2018-02-20 DIAGNOSIS — Z22.7 LATENT TUBERCULOSIS BY BLOOD TEST: ICD-10-CM

## 2018-02-20 DIAGNOSIS — Z94.0 DECEASED-DONOR KIDNEY TRANSPLANT: ICD-10-CM

## 2018-02-20 PROCEDURE — 80197 ASSAY OF TACROLIMUS: CPT | Performed by: INTERNAL MEDICINE

## 2018-02-22 DIAGNOSIS — Z94.0 KIDNEY REPLACED BY TRANSPLANT: Primary | ICD-10-CM

## 2018-02-22 DIAGNOSIS — Z79.899 ENCOUNTER FOR LONG-TERM CURRENT USE OF MEDICATION: ICD-10-CM

## 2018-02-22 DIAGNOSIS — Z48.298 AFTERCARE FOLLOWING ORGAN TRANSPLANT: ICD-10-CM

## 2018-02-22 LAB
TACROLIMUS BLD-MCNC: 10 UG/L (ref 5–15)
TME LAST DOSE: NORMAL H

## 2018-02-26 ENCOUNTER — TELEPHONE (OUTPATIENT)
Dept: TRANSPLANT | Facility: CLINIC | Age: 53
End: 2018-02-26

## 2018-02-26 RX ORDER — MYCOPHENOLATE MOFETIL 250 MG/1
750 CAPSULE ORAL 2 TIMES DAILY
Qty: 180 CAPSULE | Refills: 11 | Status: SHIPPED | OUTPATIENT
Start: 2018-02-26 | End: 2019-02-21

## 2018-02-26 RX ORDER — SULFAMETHOXAZOLE AND TRIMETHOPRIM 400; 80 MG/1; MG/1
1 TABLET ORAL
Qty: 12 TABLET | Refills: 11 | Status: SHIPPED | OUTPATIENT
Start: 2018-02-27 | End: 2019-02-21

## 2018-02-26 RX ORDER — VALGANCICLOVIR 450 MG/1
TABLET, FILM COATED ORAL
Qty: 60 TABLET | Refills: 1 | OUTPATIENT
Start: 2018-02-26

## 2018-02-26 NOTE — TELEPHONE ENCOUNTER
Left message for patient to D/C Valcyte.  Updated current standing lab orders to include monthly CMV PCR QT.

## 2018-02-26 NOTE — TELEPHONE ENCOUNTER
CMV negative times 3 -  Plan please call Angelica Romero to stop her Valycte - if not already        CMV PCR QT monthly - labs orders updated

## 2018-02-28 DIAGNOSIS — I10 ESSENTIAL HYPERTENSION: ICD-10-CM

## 2018-02-28 DIAGNOSIS — B25.9 CYTOMEGALOVIRUS (CMV) VIREMIA (H): ICD-10-CM

## 2018-02-28 RX ORDER — AMLODIPINE BESYLATE 10 MG/1
10 TABLET ORAL DAILY
Qty: 30 TABLET | Refills: 11 | Status: SHIPPED | OUTPATIENT
Start: 2018-02-28 | End: 2019-02-21

## 2018-02-28 RX ORDER — VALGANCICLOVIR 450 MG/1
TABLET, FILM COATED ORAL
Qty: 60 TABLET | Refills: 1 | OUTPATIENT
Start: 2018-02-28

## 2018-02-28 RX ORDER — METOPROLOL TARTRATE 50 MG
50 TABLET ORAL 2 TIMES DAILY
Qty: 60 TABLET | Refills: 11 | Status: SHIPPED | OUTPATIENT
Start: 2018-02-28 | End: 2019-02-21

## 2018-03-05 ENCOUNTER — TRANSFERRED RECORDS (OUTPATIENT)
Dept: HEALTH INFORMATION MANAGEMENT | Facility: CLINIC | Age: 53
End: 2018-03-05

## 2018-04-24 DIAGNOSIS — Z48.298 AFTERCARE FOLLOWING ORGAN TRANSPLANT: ICD-10-CM

## 2018-04-24 DIAGNOSIS — Z94.0 KIDNEY REPLACED BY TRANSPLANT: ICD-10-CM

## 2018-04-24 DIAGNOSIS — Z79.899 ENCOUNTER FOR LONG-TERM CURRENT USE OF MEDICATION: ICD-10-CM

## 2018-04-24 PROCEDURE — 80197 ASSAY OF TACROLIMUS: CPT | Performed by: INTERNAL MEDICINE

## 2018-04-26 ENCOUNTER — OFFICE VISIT (OUTPATIENT)
Dept: NEPHROLOGY | Facility: CLINIC | Age: 53
End: 2018-04-26
Attending: INTERNAL MEDICINE
Payer: MEDICARE

## 2018-04-26 VITALS
BODY MASS INDEX: 38.59 KG/M2 | TEMPERATURE: 98.1 F | HEIGHT: 63 IN | OXYGEN SATURATION: 98 % | DIASTOLIC BLOOD PRESSURE: 86 MMHG | WEIGHT: 217.8 LBS | HEART RATE: 64 BPM | SYSTOLIC BLOOD PRESSURE: 132 MMHG | RESPIRATION RATE: 16 BRPM

## 2018-04-26 DIAGNOSIS — I15.1 HYPERTENSION SECONDARY TO OTHER RENAL DISORDERS: ICD-10-CM

## 2018-04-26 DIAGNOSIS — H60.501 ACUTE OTITIS EXTERNA OF RIGHT EAR, UNSPECIFIED TYPE: Primary | ICD-10-CM

## 2018-04-26 DIAGNOSIS — E83.39 HYPOPHOSPHATEMIA: ICD-10-CM

## 2018-04-26 DIAGNOSIS — R61 NIGHT SWEATS: ICD-10-CM

## 2018-04-26 DIAGNOSIS — Z48.298 AFTERCARE FOLLOWING ORGAN TRANSPLANT: ICD-10-CM

## 2018-04-26 DIAGNOSIS — R79.89 ABNORMAL LFTS: ICD-10-CM

## 2018-04-26 DIAGNOSIS — Z94.0 KIDNEY REPLACED BY TRANSPLANT: ICD-10-CM

## 2018-04-26 DIAGNOSIS — E55.9 VITAMIN D DEFICIENCY: ICD-10-CM

## 2018-04-26 DIAGNOSIS — D84.9 IMMUNOSUPPRESSED STATUS (H): ICD-10-CM

## 2018-04-26 DIAGNOSIS — N25.81 SECONDARY RENAL HYPERPARATHYROIDISM (H): ICD-10-CM

## 2018-04-26 LAB
TACROLIMUS BLD-MCNC: 5.4 UG/L (ref 5–15)
TME LAST DOSE: NORMAL H

## 2018-04-26 PROCEDURE — 36415 COLL VENOUS BLD VENIPUNCTURE: CPT | Performed by: INTERNAL MEDICINE

## 2018-04-26 PROCEDURE — 87799 DETECT AGENT NOS DNA QUANT: CPT | Performed by: INTERNAL MEDICINE

## 2018-04-26 PROCEDURE — G0463 HOSPITAL OUTPT CLINIC VISIT: HCPCS | Mod: ZF

## 2018-04-26 RX ORDER — ACETIC ACID 20.65 MG/ML
4 SOLUTION AURICULAR (OTIC) 3 TIMES DAILY
Qty: 15 ML | Refills: 0 | Status: SHIPPED | OUTPATIENT
Start: 2018-04-26 | End: 2021-06-17

## 2018-04-26 ASSESSMENT — PAIN SCALES - GENERAL: PAINLEVEL: NO PAIN (0)

## 2018-04-26 NOTE — MR AVS SNAPSHOT
After Visit Summary   4/26/2018    Angelica Romero    MRN: [id number]           Patient Information     Date Of Birth          1965        Visit Information        Provider Department      4/26/2018 11:00 AM Bill Tejeda MD [facility] Nephrology        Today's Diagnoses     Acute otitis externa of right ear, unspecified type    -  1    Kidney replaced by transplant        Immunosuppressed status (H)        Night sweats          Care Instructions    Preventive Care:    Breast Cancer Screening: During our visit today, we discussed that it is recommended you receive breast cancer screening. Please call or make an appointment with your primary care provider to discuss this with them. You may also call the [facility] scheduling line ([phone]) to set up a mammography appointment at the Breast Center within the [facility].                Follow-ups after your visit        Follow-up notes from your care team     Return in about 1 year (around 4/26/2019).      Your next 10 appointments already scheduled     Apr 26, 2018 12:15 PM CDT   Lab with  LAB   [facility] Lab ([facility])    [address] [place] [address]  1st Floor  [facility] [id number] [phone]            Jul 31, 2018 11:30 AM CDT   (Arrive by 11:15 AM)   Return General Liver with Christina Santos MD   [facility] Hepatology ([facility])    [address] [place] [address]  [address]  [facility] [id number] [phone]            Apr 23, 2019  1:55 PM CDT   (Arrive by 1:25 PM)   Return Kidney Transplant with Bill Tejeda MD   [facility] Nephrology ([facility])    [address] [place] [address]  [address]  [facility] [address]   [phone]              Future tests that were ordered for you today     Open Future Orders        Priority Expected Expires Ordered    CMV DNA quantification Routine  5/26/2018 4/26/2018    EBV DNA PCR Quantitative Whole  "Blood Routine  5/26/2018 4/26/2018            Who to contact     If you have questions or need follow up information about today's clinic visit or your schedule please contact [facility] NEPHROLOGY directly at [phone].  Normal or non-critical lab and imaging results will be communicated to you by [employer], letter or phone within 4 business days after the clinic has received the results. If you do not hear from us within 7 days, please contact the clinic through Allakoshart or phone. If you have a critical or abnormal lab result, we will notify you by phone as soon as possible.  Submit refill requests through [facility] or call your pharmacy and they will forward the refill request to us. Please allow 3 business days for your refill to be completed.          Additional Information About Your Visit        [facility] Information     [facility] gives you secure access to your electronic health record. If you see a primary care provider, you can also send messages to your care team and make appointments. If you have questions, please call your primary care clinic.  If you do not have a primary care provider, please call [phone] and they will assist you.        Care EveryWhere ID     This is your Care EveryWhere ID. This could be used by other organizations to access your [facility] medical records  [id number]        Your Vitals Were     Pulse Temperature Respirations Height Pulse Oximetry BMI (Body Mass Index)    64 98.1  F (36.7  C) (Oral) 16 1.608 m (5' 3.3\") 98% 38.22 kg/m2       Blood Pressure from Last 3 Encounters:   04/26/18 132/86   02/05/18 139/88   12/18/17 148/86    Weight from Last 3 Encounters:   04/26/18 98.8 kg (217 lb 12.8 oz)   02/05/18 94.6 kg (208 lb 9.6 oz)   12/18/17 90.8 kg (200 lb 3.2 oz)                 Today's Medication Changes          These changes are accurate as of 4/26/18 11:49 AM.  If you have any questions, ask your nurse or doctor.               Start taking these medicines.        " Dose/Directions    acetic acid 2 % otic solution   Commonly known as:  VOSOL   Used for:  Acute otitis externa of right ear, unspecified type   Started by:  Bill Tejeda MD        Dose:  4 drop   Place 4 drops into the right ear 3 times daily   Quantity:  15 mL   Refills:  0            Where to get your medicines      These medications were sent to [facility] [address] [place] [address]     Phone:  [phone]     acetic acid 2 % otic solution                Primary Care Provider Office Phone # Fax #    Harry Laboy [phone]       [facility] [address] [place] [address]        Equal Access to Services     [facility]: Hadii cynthia ku hadasho Soomaali, waaxda luqadaha, qaybta kaalmada adeegyada, waxay josselyn zuniga . [facility] [phone].    ATENCIÓN: Si habla español, tiene a park disposición servicios gratuitos de asistencia lingüística. [facility] [phone].    We comply with applicable federal civil rights laws and [place] laws. We do not discriminate on the basis of race, color, national origin, age, disability, sex, sexual orientation, or gender identity.            Thank you!     Thank you for choosing [facility] NEPHROLOGY  for your care. Our goal is always to provide you with excellent care. Hearing back from our patients is one way we can continue to improve our services. Please take a few minutes to complete the written survey that you may receive in the mail after your visit with us. Thank you!             Your Updated Medication List - Protect others around you: Learn how to safely use, store and throw away your medicines at www.disposemymeds.org.          This list is accurate as of 4/26/18 11:49 AM.  Always use your most recent med list.                   Brand Name Dispense Instructions for use Diagnosis    acetic acid 2 % otic solution    VOSOL    15 mL    Place 4  drops into the right ear 3 times daily    Acute otitis externa of right ear, unspecified type       amLODIPine 10 MG tablet    NORVASC    30 tablet    Take 1 tablet (10 mg) by mouth daily    Essential hypertension       cholecalciferol 1000 UNIT tablet    vitamin D3    60 tablet    Take 2 tablets (2,000 Units) by mouth daily    -donor kidney transplant, Latent tuberculosis by blood test, Aftercare following organ transplant, Kidney replaced by transplant       losartan 50 MG tablet    COZAAR    90 tablet    Take 1 tablet (50 mg) by mouth At Bedtime    Hypertension secondary to other renal disorders       magnesium oxide 400 MG tablet    MAG-OX    120 tablet    Take 2 tablets (800 mg) by mouth 2 times daily    Hypomagnesemia       metoprolol tartrate 50 MG tablet    LOPRESSOR    60 tablet    Take 1 tablet (50 mg) by mouth 2 times daily    Essential hypertension       mycophenolate 250 MG capsule    GENERIC EQUIVALENT    180 capsule    Take 3 capsules (750 mg) by mouth 2 times daily    -donor kidney transplant, Latent tuberculosis by blood test, Aftercare following organ transplant, Kidney replaced by transplant       omeprazole 20 MG CR capsule    priLOSEC    30 capsule    TAKE ONE CAPSULE BY MOUTH ONCE DAILY    Gastroesophageal reflux disease without esophagitis       sulfamethoxazole-trimethoprim 400-80 MG per tablet    BACTRIM/SEPTRA    12 tablet    Take 1 tablet by mouth three times a week Take on Monday, Wednesday, and Saturday. The dose of this medication may need to be changed as your kidney function improves (ask your Nephrologist).    -donor kidney transplant, Latent tuberculosis by blood test, Aftercare following organ transplant, Kidney replaced by transplant       * tacrolimus 1 MG capsule    GENERIC EQUIVALENT    60 capsule    Take 1 capsule (1 mg) by mouth 2 times daily    -donor kidney transplant, Latent tuberculosis by blood test, Aftercare following organ transplant,  Kidney replaced by transplant       * tacrolimus 0.5 MG capsule    GENERIC EQUIVALENT    60 capsule    HOLD    Aftercare following organ transplant, Kidney replaced by transplant, Latent tuberculosis by blood test, -donor kidney transplant       * Notice:  This list has 2 medication(s) that are the same as other medications prescribed for you. Read the directions carefully, and ask your doctor or other care provider to review them with you.

## 2018-04-26 NOTE — LETTER
4/26/2018       RE: Angelica Romero  [address]  [facility] [address]     Dear Colleague,    Thank you for referring your patient, Angelica Romero, to the [facility] NEPHROLOGY at [facility]. Please see a copy of my visit note below.    Assessment and Plan:  1. DDKT - baseline Cr ~ 1.1-1.3, which has remained stable.  Mild proteinuria.  No DSA.     2. Immunosuppression- On Tacrolimus 1 mg twice daily and  mg twice daily. Tac level is 5.4. Goal 3-6.  - Will make no changes in immunosuppression.    3. HTN - fair control right at target of less than 140/90.  Will increase losartan to 50 mg at bedtime, Metoprolol 50 mg twice daily and Norvasc 10 mg daily  -we will make no changes in current regimen     4. Elevated blood glucose - stable fasting glucose running ~ 100-120s. Hgba1c was 5.3% in 02/2018.   -patient was encouraged to loose weight and increase exercise.    5. Secondary renal hyperparathyroidism - mildly elevated PTH. Will recheck PTH again at next clinic visit.    6. Vitamin D deficiency - slightly low vitamin D level of 28 ug/L in 12/2017. We will continue cholecalciferol 2000U daily    7. H/o Hypomagnesemia -will continue on oral magnesium supplement.    8. Latent TB - patient has finished treatment with INH.    9. Transaminates-hepatocellular pattern. Initially this was felt likely multifactorial secondary to INH, overlapping with CMV viremia, but most likely an element of LOPEZ based on weight gain over few months, and co-morbid conditions associated with LOPEZ. LFTs are trending up. Patient follows with . 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 We will follow    11. Overweight - recommend increased exercise as able and watch caloric intake.    12. H/o Skin cancer-patient states that she had cancerous lesion removed under her left eye prior to kidney transplant surgery.  "Patient reports new skin lesions on the upper and lower extremities. Patient states that she is scheduled to see Dermatologist in July, 2018. Recommend regular follow up with Dermatology.    13. Night sweats-Etiology unclear. EBV is negative. CMV DNA detected, less than 137 IU/mL. No adenopathy appreciated on exam. Mild leukopenia is stable. Patient has finished treatment with INH for latent TB.    14. Mild otitis externa, right-we will treat with acetic acid 2% otic solution 4 drops into right ear three times daily.    15. GERD-controlled with Omeprazole 20 mg daily       Recommend to follow up with transplant nephrology clinic in 12 months.    Assessment and plan was discussed with patient and she voiced her understanding and agreement.    Seen and discussed Dr.Spong Jackeline Zavala MD  Nephrology Fellow    Attestation:  This patient has been seen and evaluated by me, Bill Tejeda MD.  I have reviewed the note and agree with plan of care as documented by the fellow.       Reason for Visit:  Ms. Romero is here with her spouse for routine follow up.    HPI:   Angelica Romero is a 52 year old female with ESKD from unclear etiology and is status post DDKT on 2/8/17.         Transplant Hx:       Tx: DDKT  Date: 2/8/17       Present Maintenance IS: Tacrolimus and Mycophenolate mofetil       Baseline Creatinine: 1.1-1.3       Recent DSA: No  Date last checked: 8/2017       Biopsy: No    Ms. Romero reports feeling good overall with some medical complaints. Her energy level is good and pretty much normal. She is active, although only gets a little exercise. Patient  gained about 17 lbs in the last 4 months. Patient reports persistent mild night sweats for last 5-6 months. She reports few new \" freckles like\" skin lesions on both forearms and legs after her recent trip to [place]. Patient states that she is scheduled to see Dermatologist in July. Patient complaining persistent 3/10 aching pain in the right " ear.    Patient denies: fever, chills, decrease hearing, ear drainage,  dizziness, adenopathy, sore throat, rhinorrhea, cough, shortness of breath , chest pain, palpitations, orthopnea, nausea, vomiting, abdominal pain, changes in bowel habits, dysuria, urinary frequency, urgency, hematuria, rash.    Home BP: 137-140/80s.      ROS:   A comprehensive review of systems was obtained and negative, except as noted in the HPI or PMH.    Active Medical Problems:  Patient Active Problem List   Diagnosis     Hypertension secondary to other renal disorders     Obesity, unspecified     Kidney replaced by transplant     Immunosuppressed status (H)     Aftercare following organ transplant     Secondary renal hyperparathyroidism (H)     Vitamin D deficiency     Gastroesophageal reflux disease without esophagitis     Hypophosphatemia     Latent tuberculosis       Personal Hx:  Social History     Social History     Marital status:      Spouse name: N/A     Number of children: N/A     Years of education: N/A     Occupational History     Not on file.     Social History Main Topics     Smoking status: Former Smoker     Packs/day: 0.20     Years: 15.00     Types: Cigarettes     Quit date: 1/1/2016     Smokeless tobacco: Never Used     Alcohol use 1.8 oz/week     3 Standard drinks or equivalent per week     Drug use: No     Sexual activity: Not on file     Other Topics Concern     Not on file     Social History Narrative       Allergies:  No Known Allergies    Medications:  Prior to Admission medications    Medication Sig Start Date End Date Taking? Authorizing Provider   phosphorus tablet 250 mg (K PHOS NEUTRAL) 250 MG per tablet Take 2 tablets (500 mg) by mouth 2 times daily 3/30/17  Yes Bill Tejeda MD   omeprazole (PRILOSEC) 20 MG CR capsule Take 1 capsule (20 mg) by mouth daily 3/30/17  Yes Bill Tejeda MD   magnesium oxide (MAG-OX) 400 MG tablet Take 2 tablets (800 mg) by mouth 2 times daily 3/7/17  Yes  Jose Naranjo MD   valGANciclovir (VALCYTE) 450 MG tablet Take 1 tablet (450 mg) by mouth daily You should take this medication for 6 months total after your transplant. 2/23/17  Yes Bill Tejeda MD   isoniazid (NYDRAZID) 300 MG tablet Take 1 tablet (300 mg) by mouth daily Take for 9 months total. You should have your liver function tests drawn monthly while on this medication. 2/17/17  Yes Thony Montes MD   mycophenolate (CELLCEPT - GENERIC EQUIVALENT) 250 MG capsule Take 3 capsules (750 mg) by mouth 2 times daily 2/17/17  Yes Thony Montes MD   sulfamethoxazole-trimethoprim (BACTRIM/SEPTRA) 400-80 MG per tablet Take 1 tablet by mouth three times a week Take on Monday, Wednesday, and Saturday. The dose of this medication may need to be changed as your kidney function improves (ask your Nephrologist). 2/18/17  Yes Thony Montes MD   tacrolimus (PROGRAF - GENERIC EQUIVALENT) 0.5 MG capsule Take 1 capsule (0.5 mg) by mouth 2 times daily Total dose = 3.5 mg BID 2/17/17  Yes Thony Montes MD   tacrolimus (PROGRAF - GENERIC EQUIVALENT) 1 MG capsule Take 3 capsules (3 mg) by mouth 2 times daily Total dose = 3.5 mg BID 2/17/17  Yes Thony Montes MD   ondansetron (ZOFRAN-ODT) 4 MG ODT tab Take 1 tablet (4 mg) by mouth every 6 hours as needed for nausea 2/11/17  Yes Felisha Coy MD   amLODIPine (NORVASC) 10 MG tablet Take 1 tablet (10 mg) by mouth daily 2/11/17  Yes Felisha Coy MD   senna-docusate (SENOKOT-S;PERICOLACE) 8.6-50 MG per tablet Take 1-2 tablets by mouth 2 times daily as needed for constipation 2/11/17  Yes Felisha Coy MD   pyridOXINE (VITAMIN B-6) 25 MG tablet Take 1 tablet (25 mg) by mouth daily 2/11/17  Yes Felisha Coy MD   metoprolol (LOPRESSOR) 50 MG tablet Take 1 tablet (50 mg) by mouth 2 times daily 2/11/17  Yes Felisha Coy MD   polyethylene glycol (MIRALAX) powder Take 17 g by mouth daily as needed.   Yes Reported, Patient  "      Vitals:  /86 (BP Location: Right arm, Patient Position: Sitting, Cuff Size: Adult Regular)  Pulse 64  Temp 98.1  F (36.7  C) (Oral)  Resp 16  Ht 1.608 m (5' 3.3\")  Wt 98.8 kg (217 lb 12.8 oz)  SpO2 98%  BMI 38.22 kg/m2    Exam:   GENERAL APPEARANCE: obese, alert and no distress  HENT: mouth without ulcers or lesions, mild erythema of the right external ear canal, no edema, no erythema and no edema of the left external ear canal and TMs b/l  LYMPHATICS: no cervical or supraclavicular nodes  RESP: lungs clear to auscultation - no rales, rhonchi or wheezes  CV: regular rhythm, normal rate, no rub, no murmur  EDEMA: none to trace LE edema bilaterally  ABDOMEN:obrdominal pannus, soft, nondistended, nontender, bowel sounds normal, overweight  MS: extremities normal - no gross deformities noted, no evidence of inflammation in joints, no muscle tenderness; left upper extremity AV fistula with no thrill  SKIN: no rash  TX KIDNEY: normal    Results:   Recent Results (from the past 168 hour(s))   CBC with platelets    Collection Time: 04/24/18 10:37 AM   Result Value Ref Range    WBC Count (External) 3.4 (L) 4.1 - 10.9 10(3)uL    RBC Count (External) 4.11 3.85 - 5.05 10(6)uL    Hemoglobin (External) 13.5 11.7 - 15.5 g/dL    Hematocrit (External) 39.5 35.0 - 46.0 %    MCV (External) 96.1 82.0 - 99.0 fL    MCH (External) 32.8 27.0 - 34.0 pg    MCHC (External) 34.2 32.0 - 36.0 g/dL    RDW (External) 13.6 11.5 - 15.0 %    Platelet Count (External) 205 150 - 450 10(3)uL   Basic metabolic panel    Collection Time: 04/24/18 10:37 AM   Result Value Ref Range    Sodium (External) 137 133 - 144 mmol/L    Potassium (External) 3.8 3.4 - 5.1 mmol/L    Chloride (External) 104 96 - 109 mmol/L    CO2 (External) 21 21 - 33 mmol/L    Anion Gap (External) 12 2 - 16 mmol/L    Urea Nitrogen (External) 14 6 - 24 mg/dL    Creatinine (External) 1.1 (H) 0.4 - 1.0 mg/dL    Glucose (External) 118 (H) 70 - 99 mg/dL    Calcium " (External) 9.4 8.2 - 10.0 mg/dL    GFR Estimated (External) 52.0 (L) 60.0 - 150.0 mL/min   Hepatic panel    Collection Time: 04/24/18 10:37 AM   Result Value Ref Range    Albumin (External) 3.5 3.3 - 5.0 g/dL    Bilirubin Total (External) 0.9 0.4 - 1.4 mg/dL    Bilirubin Direct (External) 0.3 0.0 - 0.3 mg/dL    AST (External) 306 (H) 14 - 41 U/L    Alk Phosphatase (External) 90 45 - 115 IU/L    ALT (External) 248 (H) 7 - 55 U/L    Protein Total (External) 7.1 6.2 - 8.2 g/dL       Again, thank you for allowing me to participate in the care of your patient.      Sincerely,    Bill Tejeda MD

## 2018-04-26 NOTE — PATIENT INSTRUCTIONS
Preventive Care:    Breast Cancer Screening: During our visit today, we discussed that it is recommended you receive breast cancer screening. Please call or make an appointment with your primary care provider to discuss this with them. You may also call the [facility] scheduling line ([phone]) to set up a mammography appointment at the Breast Center within the [facility].

## 2018-04-26 NOTE — LETTER
4/26/2018      RE: Angelica Romero  [address]  [facility] [address]       Assessment and Plan:  1. DDKT - baseline Cr ~ 1.1-1.3, which has remained stable.  Mild proteinuria.  No DSA.     2. Immunosuppression- On Tacrolimus 1 mg twice daily and  mg twice daily. Tac level is 5.4. Goal 3-6.  - Will make no changes in immunosuppression.    3. HTN - fair control right at target of less than 140/90.  Will increase losartan to 50 mg at bedtime, Metoprolol 50 mg twice daily and Norvasc 10 mg daily  -we will make no changes in current regimen     4. Elevated blood glucose - stable fasting glucose running ~ 100-120s. Hgba1c was 5.3% in 02/2018.   -patient was encouraged to loose weight and increase exercise.    5. Secondary renal hyperparathyroidism - mildly elevated PTH. Will recheck PTH again at next clinic visit.    6. Vitamin D deficiency - slightly low vitamin D level of 28 ug/L in 12/2017. We will continue cholecalciferol 2000U daily    7. H/o Hypomagnesemia -will continue on oral magnesium supplement.    8. Latent TB - patient has finished treatment with INH.    9. Transaminates-hepatocellular pattern. Initially this was felt likely multifactorial secondary to INH, overlapping with CMV viremia, but most likely an element of LOPEZ based on weight gain over few months, and co-morbid conditions associated with LOPEZ. LFTs are trending up. Patient follows with . 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 We will follow    11. Overweight - recommend increased exercise as able and watch caloric intake.    12. H/o Skin cancer-patient states that she had cancerous lesion removed under her left eye prior to kidney transplant surgery. Patient reports new skin lesions on the upper and lower extremities. Patient states that she is scheduled to see Dermatologist in July, 2018. Recommend regular follow up with  "Dermatology.    13. Night sweats-Etiology unclear. EBV is negative. CMV DNA detected, less than 137 IU/mL. No adenopathy appreciated on exam. Mild leukopenia is stable. Patient has finished treatment with INH for latent TB.    14. Mild otitis externa, right-we will treat with acetic acid 2% otic solution 4 drops into right ear three times daily.    15. GERD-controlled with Omeprazole 20 mg daily       Recommend to follow up with transplant nephrology clinic in 12 months.    Assessment and plan was discussed with patient and she voiced her understanding and agreement.    Seen and discussed Dr.Spong Jackeline Zavala MD  Nephrology Fellow    Attestation:  This patient has been seen and evaluated by me, Bill Tejeda MD.  I have reviewed the note and agree with plan of care as documented by the fellow.       Reason for Visit:  Ms. Romero is here with her spouse for routine follow up.    HPI:   Angelica Romero is a 52 year old female with ESKD from unclear etiology and is status post DDKT on 2/8/17.         Transplant Hx:       Tx: DDKT  Date: 2/8/17       Present Maintenance IS: Tacrolimus and Mycophenolate mofetil       Baseline Creatinine: 1.1-1.3       Recent DSA: No  Date last checked: 8/2017       Biopsy: No    Ms. Romero reports feeling good overall with some medical complaints. Her energy level is good and pretty much normal. She is active, although only gets a little exercise. Patient  gained about 17 lbs in the last 4 months. Patient reports persistent mild night sweats for last 5-6 months. She reports few new \" freckles like\" skin lesions on both forearms and legs after her recent trip to [place]. Patient states that she is scheduled to see Dermatologist in July. Patient complaining persistent 3/10 aching pain in the right ear.    Patient denies: fever, chills, decrease hearing, ear drainage,  dizziness, adenopathy, sore throat, rhinorrhea, cough, shortness of breath , chest pain, palpitations, " orthopnea, nausea, vomiting, abdominal pain, changes in bowel habits, dysuria, urinary frequency, urgency, hematuria, rash.    Home BP: 137-140/80s.      ROS:   A comprehensive review of systems was obtained and negative, except as noted in the HPI or PMH.    Active Medical Problems:  Patient Active Problem List   Diagnosis     Hypertension secondary to other renal disorders     Obesity, unspecified     Kidney replaced by transplant     Immunosuppressed status (H)     Aftercare following organ transplant     Secondary renal hyperparathyroidism (H)     Vitamin D deficiency     Gastroesophageal reflux disease without esophagitis     Hypophosphatemia     Latent tuberculosis       Personal Hx:  Social History     Social History     Marital status:      Spouse name: N/A     Number of children: N/A     Years of education: N/A     Occupational History     Not on file.     Social History Main Topics     Smoking status: Former Smoker     Packs/day: 0.20     Years: 15.00     Types: Cigarettes     Quit date: 1/1/2016     Smokeless tobacco: Never Used     Alcohol use 1.8 oz/week     3 Standard drinks or equivalent per week     Drug use: No     Sexual activity: Not on file     Other Topics Concern     Not on file     Social History Narrative       Allergies:  No Known Allergies    Medications:  Prior to Admission medications    Medication Sig Start Date End Date Taking? Authorizing Provider   phosphorus tablet 250 mg (K PHOS NEUTRAL) 250 MG per tablet Take 2 tablets (500 mg) by mouth 2 times daily 3/30/17  Yes Bill Tejeda MD   omeprazole (PRILOSEC) 20 MG CR capsule Take 1 capsule (20 mg) by mouth daily 3/30/17  Yes Bill Tejeda MD   magnesium oxide (MAG-OX) 400 MG tablet Take 2 tablets (800 mg) by mouth 2 times daily 3/7/17  Yes Jose Naranjo MD   valGANciclovir (VALCYTE) 450 MG tablet Take 1 tablet (450 mg) by mouth daily You should take this medication for 6 months total after your transplant.  "2/23/17  Yes Bill Tejeda MD   isoniazid (NYDRAZID) 300 MG tablet Take 1 tablet (300 mg) by mouth daily Take for 9 months total. You should have your liver function tests drawn monthly while on this medication. 2/17/17  Yes Thony Montes MD   mycophenolate (CELLCEPT - GENERIC EQUIVALENT) 250 MG capsule Take 3 capsules (750 mg) by mouth 2 times daily 2/17/17  Yes Thony Montes MD   sulfamethoxazole-trimethoprim (BACTRIM/SEPTRA) 400-80 MG per tablet Take 1 tablet by mouth three times a week Take on Monday, Wednesday, and Saturday. The dose of this medication may need to be changed as your kidney function improves (ask your Nephrologist). 2/18/17  Yes Thony Montes MD   tacrolimus (PROGRAF - GENERIC EQUIVALENT) 0.5 MG capsule Take 1 capsule (0.5 mg) by mouth 2 times daily Total dose = 3.5 mg BID 2/17/17  Yes Thony Montes MD   tacrolimus (PROGRAF - GENERIC EQUIVALENT) 1 MG capsule Take 3 capsules (3 mg) by mouth 2 times daily Total dose = 3.5 mg BID 2/17/17  Yes Thony Montes MD   ondansetron (ZOFRAN-ODT) 4 MG ODT tab Take 1 tablet (4 mg) by mouth every 6 hours as needed for nausea 2/11/17  Yes Felisha Coy MD   amLODIPine (NORVASC) 10 MG tablet Take 1 tablet (10 mg) by mouth daily 2/11/17  Yes Felisha Coy MD   senna-docusate (SENOKOT-S;PERICOLACE) 8.6-50 MG per tablet Take 1-2 tablets by mouth 2 times daily as needed for constipation 2/11/17  Yes Felisha Coy MD   pyridOXINE (VITAMIN B-6) 25 MG tablet Take 1 tablet (25 mg) by mouth daily 2/11/17  Yes Felisha Coy MD   metoprolol (LOPRESSOR) 50 MG tablet Take 1 tablet (50 mg) by mouth 2 times daily 2/11/17  Yes Felisha Coy MD   polyethylene glycol (MIRALAX) powder Take 17 g by mouth daily as needed.   Yes Reported, Patient       Vitals:  /86 (BP Location: Right arm, Patient Position: Sitting, Cuff Size: Adult Regular)  Pulse 64  Temp 98.1  F (36.7  C) (Oral)  Resp 16  Ht 1.608 m (5' 3.3\")  Wt " 98.8 kg (217 lb 12.8 oz)  SpO2 98%  BMI 38.22 kg/m2    Exam:   GENERAL APPEARANCE: obese, alert and no distress  HENT: mouth without ulcers or lesions, mild erythema of the right external ear canal, no edema, no erythema and no edema of the left external ear canal and TMs b/l  LYMPHATICS: no cervical or supraclavicular nodes  RESP: lungs clear to auscultation - no rales, rhonchi or wheezes  CV: regular rhythm, normal rate, no rub, no murmur  EDEMA: none to trace LE edema bilaterally  ABDOMEN:obrdominal pannus, soft, nondistended, nontender, bowel sounds normal, overweight  MS: extremities normal - no gross deformities noted, no evidence of inflammation in joints, no muscle tenderness; left upper extremity AV fistula with no thrill  SKIN: no rash  TX KIDNEY: normal    Results:   Recent Results (from the past 168 hour(s))   CBC with platelets    Collection Time: 04/24/18 10:37 AM   Result Value Ref Range    WBC Count (External) 3.4 (L) 4.1 - 10.9 10(3)uL    RBC Count (External) 4.11 3.85 - 5.05 10(6)uL    Hemoglobin (External) 13.5 11.7 - 15.5 g/dL    Hematocrit (External) 39.5 35.0 - 46.0 %    MCV (External) 96.1 82.0 - 99.0 fL    MCH (External) 32.8 27.0 - 34.0 pg    MCHC (External) 34.2 32.0 - 36.0 g/dL    RDW (External) 13.6 11.5 - 15.0 %    Platelet Count (External) 205 150 - 450 10(3)uL   Basic metabolic panel    Collection Time: 04/24/18 10:37 AM   Result Value Ref Range    Sodium (External) 137 133 - 144 mmol/L    Potassium (External) 3.8 3.4 - 5.1 mmol/L    Chloride (External) 104 96 - 109 mmol/L    CO2 (External) 21 21 - 33 mmol/L    Anion Gap (External) 12 2 - 16 mmol/L    Urea Nitrogen (External) 14 6 - 24 mg/dL    Creatinine (External) 1.1 (H) 0.4 - 1.0 mg/dL    Glucose (External) 118 (H) 70 - 99 mg/dL    Calcium (External) 9.4 8.2 - 10.0 mg/dL    GFR Estimated (External) 52.0 (L) 60.0 - 150.0 mL/min   Hepatic panel    Collection Time: 04/24/18 10:37 AM   Result Value Ref Range    Albumin (External) 3.5  3.3 - 5.0 g/dL    Bilirubin Total (External) 0.9 0.4 - 1.4 mg/dL    Bilirubin Direct (External) 0.3 0.0 - 0.3 mg/dL    AST (External) 306 (H) 14 - 41 U/L    Alk Phosphatase (External) 90 45 - 115 IU/L    ALT (External) 248 (H) 7 - 55 U/L    Protein Total (External) 7.1 6.2 - 8.2 g/dL       Bill Tejeda MD

## 2018-04-26 NOTE — PROGRESS NOTES
Assessment and Plan:  1. DDKT - baseline Cr ~ 1.1-1.3, which has remained stable.  Mild proteinuria.  No DSA.     2. Immunosuppression- On Tacrolimus 1 mg twice daily and  mg twice daily. Tac level is 5.4. Goal 3-6.  - Will make no changes in immunosuppression.    3. HTN - fair control right at target of less than 140/90.  Will increase losartan to 50 mg at bedtime, Metoprolol 50 mg twice daily and Norvasc 10 mg daily  -we will make no changes in current regimen     4. Elevated blood glucose - stable fasting glucose running ~ 100-120s. Hgba1c was 5.3% in 02/2018.   -patient was encouraged to loose weight and increase exercise.    5. Secondary renal hyperparathyroidism - mildly elevated PTH. Will recheck PTH again at next clinic visit.    6. Vitamin D deficiency - slightly low vitamin D level of 28 ug/L in 12/2017. We will continue cholecalciferol 2000U daily    7. H/o Hypomagnesemia -will continue on oral magnesium supplement.    8. Latent TB - patient has finished treatment with INH.    9. Transaminates-hepatocellular pattern. Initially this was felt likely multifactorial secondary to INH, overlapping with CMV viremia, but most likely an element of LOPEZ based on weight gain over few months, and co-morbid conditions associated with LOPEZ. LFTs are trending up. Patient follows with . 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 We will follow    11. Overweight - recommend increased exercise as able and watch caloric intake.    12. H/o Skin cancer-patient states that she had cancerous lesion removed under her left eye prior to kidney transplant surgery. Patient reports new skin lesions on the upper and lower extremities. Patient states that she is scheduled to see Dermatologist in July, 2018. Recommend regular follow up with Dermatology.    13. Night sweats-Etiology unclear. EBV is negative. CMV DNA detected, less  "than 137 IU/mL. No adenopathy appreciated on exam. Mild leukopenia is stable. Patient has finished treatment with INH for latent TB.    14. Mild otitis externa, right-we will treat with acetic acid 2% otic solution 4 drops into right ear three times daily.    15. GERD-controlled with Omeprazole 20 mg daily       Recommend to follow up with transplant nephrology clinic in 12 months.    Assessment and plan was discussed with patient and she voiced her understanding and agreement.    Seen and discussed Dr.Spong Jackeline Zavala MD  Nephrology Fellow    Attestation:  This patient has been seen and evaluated by me, Bill Tejeda MD.  I have reviewed the note and agree with plan of care as documented by the fellow.       Reason for Visit:  Ms. Romero is here with her spouse for routine follow up.    HPI:   Angelica Romero is a 52 year old female with ESKD from unclear etiology and is status post DDKT on 2/8/17.         Transplant Hx:       Tx: DDKT  Date: 2/8/17       Present Maintenance IS: Tacrolimus and Mycophenolate mofetil       Baseline Creatinine: 1.1-1.3       Recent DSA: No  Date last checked: 8/2017       Biopsy: No    Ms. Romero reports feeling good overall with some medical complaints. Her energy level is good and pretty much normal. She is active, although only gets a little exercise. Patient gained about 17 lbs in the last 4 months. Patient reports persistent mild night sweats for last 5-6 months. She reports few new \" freckles like\" skin lesions on both forearms and legs after her recent trip to [place]. Patient states that she is scheduled to see Dermatologist in July. Patient complaining persistent 3/10 aching pain in the right ear.    Patient denies: fever, chills, decrease hearing, ear drainage,  dizziness, adenopathy, sore throat, rhinorrhea, cough, shortness of breath , chest pain, palpitations, orthopnea, nausea, vomiting, abdominal pain, changes in bowel habits, dysuria, urinary " frequency, urgency, hematuria, rash.    Home BP: 137-140/80s.      ROS:   A comprehensive review of systems was obtained and negative, except as noted in the HPI or PMH.    Active Medical Problems:  Patient Active Problem List   Diagnosis     Hypertension secondary to other renal disorders     Obesity, unspecified     Kidney replaced by transplant     Immunosuppressed status (H)     Aftercare following organ transplant     Secondary renal hyperparathyroidism (H)     Vitamin D deficiency     Gastroesophageal reflux disease without esophagitis     Hypophosphatemia     Latent tuberculosis       Personal Hx:  Social History     Social History     Marital status:      Spouse name: N/A     Number of children: N/A     Years of education: N/A     Occupational History     Not on file.     Social History Main Topics     Smoking status: Former Smoker     Packs/day: 0.20     Years: 15.00     Types: Cigarettes     Quit date: 1/1/2016     Smokeless tobacco: Never Used     Alcohol use 1.8 oz/week     3 Standard drinks or equivalent per week     Drug use: No     Sexual activity: Not on file     Other Topics Concern     Not on file     Social History Narrative       Allergies:  No Known Allergies    Medications:  Prior to Admission medications    Medication Sig Start Date End Date Taking? Authorizing Provider   phosphorus tablet 250 mg (K PHOS NEUTRAL) 250 MG per tablet Take 2 tablets (500 mg) by mouth 2 times daily 3/30/17  Yes Bill Tejeda MD   omeprazole (PRILOSEC) 20 MG CR capsule Take 1 capsule (20 mg) by mouth daily 3/30/17  Yes Bill Tejeda MD   magnesium oxide (MAG-OX) 400 MG tablet Take 2 tablets (800 mg) by mouth 2 times daily 3/7/17  Yes Jose Naranjo MD   valGANciclovir (VALCYTE) 450 MG tablet Take 1 tablet (450 mg) by mouth daily You should take this medication for 6 months total after your transplant. 2/23/17  Yes Bill Tejeda MD   isoniazid (NYDRAZID) 300 MG tablet Take 1 tablet  "(300 mg) by mouth daily Take for 9 months total. You should have your liver function tests drawn monthly while on this medication. 2/17/17  Yes Thony Montes MD   mycophenolate (CELLCEPT - GENERIC EQUIVALENT) 250 MG capsule Take 3 capsules (750 mg) by mouth 2 times daily 2/17/17  Yes Thony Montes MD   sulfamethoxazole-trimethoprim (BACTRIM/SEPTRA) 400-80 MG per tablet Take 1 tablet by mouth three times a week Take on Monday, Wednesday, and Saturday. The dose of this medication may need to be changed as your kidney function improves (ask your Nephrologist). 2/18/17  Yes Thony Montes MD   tacrolimus (PROGRAF - GENERIC EQUIVALENT) 0.5 MG capsule Take 1 capsule (0.5 mg) by mouth 2 times daily Total dose = 3.5 mg BID 2/17/17  Yes Thony Montes MD   tacrolimus (PROGRAF - GENERIC EQUIVALENT) 1 MG capsule Take 3 capsules (3 mg) by mouth 2 times daily Total dose = 3.5 mg BID 2/17/17  Yes Thony Montes MD   ondansetron (ZOFRAN-ODT) 4 MG ODT tab Take 1 tablet (4 mg) by mouth every 6 hours as needed for nausea 2/11/17  Yes Felisha Coy MD   amLODIPine (NORVASC) 10 MG tablet Take 1 tablet (10 mg) by mouth daily 2/11/17  Yes Felisha Coy MD   senna-docusate (SENOKOT-S;PERICOLACE) 8.6-50 MG per tablet Take 1-2 tablets by mouth 2 times daily as needed for constipation 2/11/17  Yes Felisha Coy MD   pyridOXINE (VITAMIN B-6) 25 MG tablet Take 1 tablet (25 mg) by mouth daily 2/11/17  Yes Felisha Coy MD   metoprolol (LOPRESSOR) 50 MG tablet Take 1 tablet (50 mg) by mouth 2 times daily 2/11/17  Yes Felisha Coy MD   polyethylene glycol (MIRALAX) powder Take 17 g by mouth daily as needed.   Yes Reported, Patient       Vitals:  /86 (BP Location: Right arm, Patient Position: Sitting, Cuff Size: Adult Regular)  Pulse 64  Temp 98.1  F (36.7  C) (Oral)  Resp 16  Ht 1.608 m (5' 3.3\")  Wt 98.8 kg (217 lb 12.8 oz)  SpO2 98%  BMI 38.22 kg/m2    Exam:   GENERAL APPEARANCE: " obese, alert and no distress  HENT: mouth without ulcers or lesions, mild erythema of the right external ear canal, no edema, no erythema and no edema of the left external ear canal and TMs b/l  LYMPHATICS: no cervical or supraclavicular nodes  RESP: lungs clear to auscultation - no rales, rhonchi or wheezes  CV: regular rhythm, normal rate, no rub, no murmur  EDEMA: none to trace LE edema bilaterally  ABDOMEN:obrdominal pannus, soft, nondistended, nontender, bowel sounds normal, overweight  MS: extremities normal - no gross deformities noted, no evidence of inflammation in joints, no muscle tenderness; left upper extremity AV fistula with no thrill  SKIN: no rash  TX KIDNEY: normal    Results:   Recent Results (from the past 168 hour(s))   CBC with platelets    Collection Time: 04/24/18 10:37 AM   Result Value Ref Range    WBC Count (External) 3.4 (L) 4.1 - 10.9 10(3)uL    RBC Count (External) 4.11 3.85 - 5.05 10(6)uL    Hemoglobin (External) 13.5 11.7 - 15.5 g/dL    Hematocrit (External) 39.5 35.0 - 46.0 %    MCV (External) 96.1 82.0 - 99.0 fL    MCH (External) 32.8 27.0 - 34.0 pg    MCHC (External) 34.2 32.0 - 36.0 g/dL    RDW (External) 13.6 11.5 - 15.0 %    Platelet Count (External) 205 150 - 450 10(3)uL   Basic metabolic panel    Collection Time: 04/24/18 10:37 AM   Result Value Ref Range    Sodium (External) 137 133 - 144 mmol/L    Potassium (External) 3.8 3.4 - 5.1 mmol/L    Chloride (External) 104 96 - 109 mmol/L    CO2 (External) 21 21 - 33 mmol/L    Anion Gap (External) 12 2 - 16 mmol/L    Urea Nitrogen (External) 14 6 - 24 mg/dL    Creatinine (External) 1.1 (H) 0.4 - 1.0 mg/dL    Glucose (External) 118 (H) 70 - 99 mg/dL    Calcium (External) 9.4 8.2 - 10.0 mg/dL    GFR Estimated (External) 52.0 (L) 60.0 - 150.0 mL/min   Hepatic panel    Collection Time: 04/24/18 10:37 AM   Result Value Ref Range    Albumin (External) 3.5 3.3 - 5.0 g/dL    Bilirubin Total (External) 0.9 0.4 - 1.4 mg/dL    Bilirubin Direct  (External) 0.3 0.0 - 0.3 mg/dL    AST (External) 306 (H) 14 - 41 U/L    Alk Phosphatase (External) 90 45 - 115 IU/L    ALT (External) 248 (H) 7 - 55 U/L    Protein Total (External) 7.1 6.2 - 8.2 g/dL

## 2018-04-27 LAB
CMV DNA SPEC NAA+PROBE-ACNC: <137 [IU]/ML
CMV DNA SPEC NAA+PROBE-LOG#: <2.1 {LOG_IU}/ML
EBV DNA # SPEC NAA+PROBE: NORMAL {COPIES}/ML
EBV DNA SPEC NAA+PROBE-LOG#: NORMAL {LOG_COPIES}/ML
SPECIMEN SOURCE: ABNORMAL

## 2018-04-30 PROBLEM — R79.89 ABNORMAL LFTS: Status: ACTIVE | Noted: 2018-04-30

## 2018-05-04 ENCOUNTER — TELEPHONE (OUTPATIENT)
Dept: GASTROENTEROLOGY | Facility: CLINIC | Age: 53
End: 2018-05-04

## 2018-05-04 NOTE — TELEPHONE ENCOUNTER
----- Message from Christina Santos MD sent at 2018  7:44 AM CDT -----  Would you please connect with Angelica regardin- if the US was done? I am not able to find it on the scanned documents.   2- I would suggest a liver biopsy, due to the ongoing elevation of liver enzymes in a pattern that's not simply seen with LOPEZ.     Ideally this can be done at her earliest convenience and not have to wait until our next next visit.     Thanks.   ----- Message -----     From: Bill Tejeda MD     Sent: 2018  11:39 AM       To: MD Christina Melchor,    You saw this patient for elevated LFTs in setting of previous INH and CMV, but still elevated following resolution of the above.  You felt she had fatty liver.    Please review her latest LFTs, which are up a bit.  She sees you again in July.  Secondly, she reports you ordered an abdominal ultrasound to be done at [facility], but I don't see those results were ever sent to our system.  Not sure if you saw it.    Thanks.

## 2018-05-08 ENCOUNTER — TELEPHONE (OUTPATIENT)
Dept: GASTROENTEROLOGY | Facility: CLINIC | Age: 53
End: 2018-05-08

## 2018-05-08 DIAGNOSIS — R74.8 ELEVATED LIVER ENZYMES: Primary | ICD-10-CM

## 2018-05-08 NOTE — TELEPHONE ENCOUNTER
Writer spoke to pt. Informed pt that the liver biopsy order was faxed to [facility] @ [phone]. Informed pt to call the scheduling line @ [phone] to schedule biopsy. Requested pt remind the imaging to fax results. Results of US reviewed by Dr. Santos.

## 2018-05-22 ENCOUNTER — TRANSFERRED RECORDS (OUTPATIENT)
Dept: HEALTH INFORMATION MANAGEMENT | Facility: CLINIC | Age: 53
End: 2018-05-22

## 2018-05-22 DIAGNOSIS — Z48.298 AFTERCARE FOLLOWING ORGAN TRANSPLANT: ICD-10-CM

## 2018-05-22 DIAGNOSIS — Z79.899 ENCOUNTER FOR LONG-TERM CURRENT USE OF MEDICATION: ICD-10-CM

## 2018-05-22 DIAGNOSIS — Z94.0 KIDNEY REPLACED BY TRANSPLANT: ICD-10-CM

## 2018-05-22 PROCEDURE — 80197 ASSAY OF TACROLIMUS: CPT | Performed by: INTERNAL MEDICINE

## 2018-05-24 LAB
TACROLIMUS BLD-MCNC: 8.8 UG/L (ref 5–15)
TME LAST DOSE: NORMAL H

## 2018-05-25 ENCOUNTER — TELEPHONE (OUTPATIENT)
Dept: NEPHROLOGY | Facility: CLINIC | Age: 53
End: 2018-05-25

## 2018-05-25 NOTE — TELEPHONE ENCOUNTER
ISSUE:  Tacrolimus 8.8, goal 4-6    PLAN:   Confirm this was a good trough level. Verify tacrolimus dose 1 mg BID.  Repeat labs in 1-2 weeks ensuring good trough level.     OUTCOME:   Attempted to call pt to discuss labs. No answer. Left vm asking for pt to call [facility] office back to review.

## 2018-11-21 DIAGNOSIS — I15.1 HYPERTENSION SECONDARY TO OTHER RENAL DISORDERS: ICD-10-CM

## 2018-11-21 RX ORDER — LOSARTAN POTASSIUM 50 MG/1
TABLET ORAL
Qty: 90 TABLET | Refills: 3 | Status: SHIPPED | OUTPATIENT
Start: 2018-11-21 | End: 2019-11-05

## 2019-01-07 ENCOUNTER — DOCUMENTATION ONLY (OUTPATIENT)
Dept: TRANSPLANT | Facility: CLINIC | Age: 54
End: 2019-01-07

## 2019-01-07 DIAGNOSIS — Z48.298 AFTERCARE FOLLOWING ORGAN TRANSPLANT: ICD-10-CM

## 2019-01-07 DIAGNOSIS — Z94.0 KIDNEY TRANSPLANTED: Primary | ICD-10-CM

## 2019-01-07 NOTE — LETTER
PHYSICIAN ORDERS    DATE & TIME ISSUED: 2019 7:33 PM  PATIENT NAME: Angelica Romero   : 1965     [facility] MR# [if applicable]: [id number]     DIAGNOSIS / ICD - 10 CODES    Kidney Transplanted (Z94.0)    After Care Following Organ Transplant (Z48.298)    Long Term Use of Medication (Z79.899)      Monthly    Hemogram and Platelet    Basic Metabolic Panel (Sodium,potassium,chloride,CO2,creatinine,urea,nitrogen,glucose,calcium)     / tacrolimus / Prograf drug level    Every 6 months    BK (polyoma virus) PCR Quantitative - Plasma    Urine for protein creatinine ratio    Yearly    PRA / DSA level (mailers provided by patient)      Patient should release information to the [facility].   Please fax results to the Transplant Center at [phone].  Any questions please call [phone] or [phone].    .

## 2019-01-07 NOTE — LETTER
PHYSICIAN ORDERS    DATE & TIME ISSUED: 2019 7:33 PM  PATIENT NAME: Angelica Romero   : 1965     [facility] MR# [if applicable]: [id number]     DIAGNOSIS / ICD - 10 CODES    Kidney Transplanted (Z94.0)    After Care Following Organ Transplant (Z48.298)    Long Term Use of Medication (Z79.899)      Monthly    Hemogram and Platelet    Basic Metabolic Panel (Sodium,potassium,chloride,CO2,creatinine,urea,nitrogen,glucose,calcium)     / tacrolimus / Prograf drug level    Every 6 months    BK (polyoma virus) PCR Quantitative - Plasma    Urine for protein creatinine ratio    Yearly    PRA / DSA level (mailers provided by patient)      Patient should release information to the [facility].   Please fax results to the Transplant Center at [phone].  Any questions please call [phone] or [phone].    .

## 2019-01-08 NOTE — PROGRESS NOTES
Chart Prep    Clinic Visit on:  4/23/19    Last lab completed:  5/22/18    Lab letter updated:  1/7/19    Lab orders faxed to:  [facility] [phone] (Phone)  [phone] (Fax)     Lab orders up to date in Epic.

## 2019-01-28 ENCOUNTER — TELEPHONE (OUTPATIENT)
Dept: PHARMACY | Facility: CLINIC | Age: 54
End: 2019-01-28

## 2019-02-21 ENCOUNTER — TELEPHONE (OUTPATIENT)
Dept: TRANSPLANT | Facility: CLINIC | Age: 54
End: 2019-02-21

## 2019-02-21 DIAGNOSIS — Z22.7 LATENT TUBERCULOSIS BY BLOOD TEST: ICD-10-CM

## 2019-02-21 DIAGNOSIS — Z94.0 DECEASED-DONOR KIDNEY TRANSPLANT: Primary | ICD-10-CM

## 2019-02-21 DIAGNOSIS — I10 ESSENTIAL HYPERTENSION: ICD-10-CM

## 2019-02-21 DIAGNOSIS — Z48.298 AFTERCARE FOLLOWING ORGAN TRANSPLANT: ICD-10-CM

## 2019-02-21 DIAGNOSIS — Z94.0 KIDNEY REPLACED BY TRANSPLANT: ICD-10-CM

## 2019-02-21 RX ORDER — MYCOPHENOLATE MOFETIL 250 MG/1
750 CAPSULE ORAL 2 TIMES DAILY
Qty: 180 CAPSULE | Refills: 0 | Status: SHIPPED | OUTPATIENT
Start: 2019-02-21 | End: 2019-03-27

## 2019-02-21 RX ORDER — SULFAMETHOXAZOLE AND TRIMETHOPRIM 400; 80 MG/1; MG/1
1 TABLET ORAL
Qty: 12 TABLET | Refills: 0 | Status: SHIPPED | OUTPATIENT
Start: 2019-02-21 | End: 2019-02-25

## 2019-02-21 RX ORDER — METOPROLOL TARTRATE 50 MG
TABLET ORAL
Qty: 60 TABLET | Refills: 11 | Status: SHIPPED | OUTPATIENT
Start: 2019-02-21 | End: 2019-04-23

## 2019-02-21 RX ORDER — TACROLIMUS 1 MG/1
1 CAPSULE ORAL 2 TIMES DAILY
Qty: 60 CAPSULE | Refills: 0 | Status: SHIPPED | OUTPATIENT
Start: 2019-02-21 | End: 2019-04-23

## 2019-02-21 RX ORDER — AMLODIPINE BESYLATE 10 MG/1
TABLET ORAL
Qty: 30 TABLET | Refills: 11 | Status: SHIPPED | OUTPATIENT
Start: 2019-02-21 | End: 2019-04-23

## 2019-02-21 NOTE — TELEPHONE ENCOUNTER
Received a refill request for tacrolimus.  No labs since May 2018.  Called and spoke with Angelica.  She confirms she has not had her labs drawn since May.  Discussed that she is only 2 years out from her transplant and we need to have better lab compliance.  Patient verbalized understanding and will complete labs.    1 month refill granted.

## 2019-02-21 NOTE — TELEPHONE ENCOUNTER
Mycophenolate 250mg  Last FIlled Date 1/28  Qty dispensed 180    Bactrim 400-80  Last FIlled Date 1/28  Qty dispensed 12      Thank you!  [facility]

## 2019-02-25 ENCOUNTER — TELEPHONE (OUTPATIENT)
Dept: TRANSPLANT | Facility: CLINIC | Age: 54
End: 2019-02-25

## 2019-02-25 DIAGNOSIS — Z94.0 KIDNEY REPLACED BY TRANSPLANT: ICD-10-CM

## 2019-02-25 DIAGNOSIS — Z48.298 AFTERCARE FOLLOWING ORGAN TRANSPLANT: ICD-10-CM

## 2019-02-25 DIAGNOSIS — Z94.0 DECEASED-DONOR KIDNEY TRANSPLANT: ICD-10-CM

## 2019-02-25 DIAGNOSIS — Z22.7 LATENT TUBERCULOSIS BY BLOOD TEST: ICD-10-CM

## 2019-02-25 RX ORDER — SULFAMETHOXAZOLE AND TRIMETHOPRIM 400; 80 MG/1; MG/1
1 TABLET ORAL
Qty: 12 TABLET | Refills: 0 | Status: SHIPPED | OUTPATIENT
Start: 2019-02-26 | End: 2019-02-26

## 2019-02-26 DIAGNOSIS — Z22.7 LATENT TUBERCULOSIS BY BLOOD TEST: ICD-10-CM

## 2019-02-26 DIAGNOSIS — Z94.0 DECEASED-DONOR KIDNEY TRANSPLANT: ICD-10-CM

## 2019-02-26 DIAGNOSIS — Z94.0 KIDNEY REPLACED BY TRANSPLANT: ICD-10-CM

## 2019-02-26 DIAGNOSIS — Z48.298 AFTERCARE FOLLOWING ORGAN TRANSPLANT: ICD-10-CM

## 2019-02-26 RX ORDER — SULFAMETHOXAZOLE AND TRIMETHOPRIM 400; 80 MG/1; MG/1
1 TABLET ORAL
Qty: 12 TABLET | Refills: 0 | Status: SHIPPED | OUTPATIENT
Start: 2019-02-26 | End: 2019-03-27

## 2019-02-26 NOTE — TELEPHONE ENCOUNTER
Medication/Refill approved per CPA:    [facility]LL[facility]IVE AGREEMENT FOR  IMMUNOSUPPRESSENT PRESCRIPTION MODIFICATION.      Routing encounter to Transplant as an FYI.    Thanks,  Ksenia White, PharmD  Specialty Pharmacist/Transplant  [facility]  [phone]

## 2019-03-27 DIAGNOSIS — Z48.298 AFTERCARE FOLLOWING ORGAN TRANSPLANT: ICD-10-CM

## 2019-03-27 DIAGNOSIS — Z94.0 KIDNEY REPLACED BY TRANSPLANT: Primary | ICD-10-CM

## 2019-03-27 DIAGNOSIS — Z94.0 DECEASED-DONOR KIDNEY TRANSPLANT: ICD-10-CM

## 2019-03-27 DIAGNOSIS — Z22.7 LATENT TUBERCULOSIS BY BLOOD TEST: ICD-10-CM

## 2019-03-27 RX ORDER — TACROLIMUS 1 MG/1
1 CAPSULE ORAL 2 TIMES DAILY
Qty: 60 CAPSULE | Refills: 1 | Status: SHIPPED | OUTPATIENT
Start: 2019-03-27 | End: 2023-02-10

## 2019-03-27 RX ORDER — SULFAMETHOXAZOLE AND TRIMETHOPRIM 400; 80 MG/1; MG/1
1 TABLET ORAL
Qty: 12 TABLET | Refills: 1 | Status: SHIPPED | OUTPATIENT
Start: 2019-03-28 | End: 2019-04-23

## 2019-03-27 RX ORDER — MYCOPHENOLATE MOFETIL 250 MG/1
750 CAPSULE ORAL 2 TIMES DAILY
Qty: 180 CAPSULE | Refills: 1 | Status: SHIPPED | OUTPATIENT
Start: 2019-03-27 | End: 2019-04-23

## 2019-04-23 ENCOUNTER — OFFICE VISIT (OUTPATIENT)
Dept: NEPHROLOGY | Facility: CLINIC | Age: 54
End: 2019-04-23
Attending: INTERNAL MEDICINE
Payer: MEDICARE

## 2019-04-23 VITALS
WEIGHT: 230 LBS | SYSTOLIC BLOOD PRESSURE: 137 MMHG | OXYGEN SATURATION: 95 % | HEART RATE: 73 BPM | BODY MASS INDEX: 40.36 KG/M2 | DIASTOLIC BLOOD PRESSURE: 85 MMHG

## 2019-04-23 DIAGNOSIS — Z48.298 AFTERCARE FOLLOWING ORGAN TRANSPLANT: ICD-10-CM

## 2019-04-23 DIAGNOSIS — Z12.83 SKIN CANCER SCREENING: ICD-10-CM

## 2019-04-23 DIAGNOSIS — R79.89 ABNORMAL LFTS: ICD-10-CM

## 2019-04-23 DIAGNOSIS — I10 ESSENTIAL HYPERTENSION: ICD-10-CM

## 2019-04-23 DIAGNOSIS — Z94.0 DECEASED-DONOR KIDNEY TRANSPLANT: ICD-10-CM

## 2019-04-23 DIAGNOSIS — Z86.19 HISTORY OF CMV: ICD-10-CM

## 2019-04-23 DIAGNOSIS — D84.9 IMMUNOSUPPRESSION (H): ICD-10-CM

## 2019-04-23 DIAGNOSIS — Z94.0 KIDNEY REPLACED BY TRANSPLANT: Primary | ICD-10-CM

## 2019-04-23 PROCEDURE — G0463 HOSPITAL OUTPT CLINIC VISIT: HCPCS | Mod: ZF

## 2019-04-23 RX ORDER — SULFAMETHOXAZOLE AND TRIMETHOPRIM 400; 80 MG/1; MG/1
1 TABLET ORAL
Qty: 12 TABLET | Refills: 1 | Status: SHIPPED | OUTPATIENT
Start: 2019-04-23 | End: 2019-07-02

## 2019-04-23 RX ORDER — AMLODIPINE BESYLATE 10 MG/1
10 TABLET ORAL DAILY
Qty: 30 TABLET | Refills: 11 | Status: SHIPPED | OUTPATIENT
Start: 2019-04-23 | End: 2020-04-30

## 2019-04-23 RX ORDER — MYCOPHENOLATE MOFETIL 250 MG/1
750 CAPSULE ORAL 2 TIMES DAILY
Qty: 180 CAPSULE | Refills: 1 | Status: SHIPPED | OUTPATIENT
Start: 2019-04-23 | End: 2019-07-02

## 2019-04-23 RX ORDER — TACROLIMUS 1 MG/1
1 CAPSULE ORAL 2 TIMES DAILY
Qty: 60 CAPSULE | Refills: 0 | Status: SHIPPED | OUTPATIENT
Start: 2019-04-23 | End: 2019-05-29

## 2019-04-23 RX ORDER — METOPROLOL TARTRATE 50 MG
50 TABLET ORAL 2 TIMES DAILY
Qty: 60 TABLET | Refills: 11 | Status: SHIPPED | OUTPATIENT
Start: 2019-04-23 | End: 2020-04-13

## 2019-04-23 ASSESSMENT — PAIN SCALES - GENERAL: PAINLEVEL: NO PAIN (0)

## 2019-04-23 NOTE — LETTER
2019      RE: Angelica Romero  [address]  [facility] [address]       CHRONIC TRANSPLANT NEPHROLOGY VISIT    Assessment & Plan   # DDKT: baseline Cr ~ 1.0-1.3; Stable   - Proteinuria: Minimal   - Date DSA Last Checked: 18 Latest DSA: DR52 at 694 MFI   - BK Viremia: No   - Kidney Tx Biopsy: No     The patient has a stable creatinine of 1 mg/dL.  She does have a low level donor specific antibody that we will follow every 6 months.  We will also leave her on higher doses of tacrolimus aiming towards the 6 ng/mL range.    # Immunosuppression: Tacrolimus immediate release (goal 4-6) and Mycophenolate mofetil (goal 1-3.5)   - Changes: No but will keep her tacrolimus level at 4-6 due to low  Level DSA     # Prophylaxis:   - PJP: Sulfa/TMP (Bactrim)    # Hypertension: Controlled; Goal BP: < 130/80   - Volume status: Euvolemic   - Changes: No cont amlodipine 10mg daily, metoprolol 50mg bid, losartan 50mg daily    # Post-Transplant Erythrocytosis: Hgb: Increased now is  Hgb 15    # Mineral Bone Disorder:   - Secondary renal hyperparathyroidism; PTH level is: Not checked recently  - Vitamin D; level is: Not checked recently  - Calcium; level is: High  - Phosphorus; level is: Not checked recently     # Electrolytes:   - Potassium; level: Normal  - Magnesium; level: Normal  - Bicarbonate; level: Normal  - Sodium; level: Normal    # 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    # transaminitis: trending down, will check with next labs     # Medical Compliance: Yes    # Transplant History:  Etiology of kidney failure: unknown etiology  Tx: DDKT  Transplant: 2017 (Kidney)  Donor Type:  - Brain Death Donor Class: Standard Criteria Donor  CMV detected    Significant changes in immunosuppression: None  Significant transplant-related  complications: CMV Viremia    Transplant Office Phone Number: [phone]    Assessment and plan was discussed with the patient and she voiced her understanding and agreement.    Return visit: every 12 months.     Kymberly Birch MD   Patient seen and discussed  with Dr. Saldivar  Nephrology Fellow  Pager: [phone]    Chief Complaint   Ms. Romero is a 53 year old here for routine follow up.    History of Present Illness   Since last visit, she has no specific complaint, appetite is good and no nausea or vomiting no chest pain or sob, no leg swelling no diarrhea or constipation no headache, no lightheadedness, she gain 15lbs since last year, she said her BP is controlled at home around 130/80.     Review Of Systems  Skin: negative  Eyes: negative  Ears/Nose/Throat: negative  Respiratory: No shortness of breath, dyspnea on exertion, cough, or hemoptysis  Cardiovascular: negative  Gastrointestinal: negative  Genitourinary: negative  Musculoskeletal: negative  Neurologic: negative  Psychiatric: negative  Hematologic/Lymphatic/Immunologic: negative  Endocrine: negative      Recent Hospitalizations:  [x] No [] Yes    New Medical Issues: [x] No [] Yes    Decreased energy: [x] No [] Yes    Chest pain or SOB with exertion:  [x] No [] Yes    Appetite change or weight change: [] No [x] Yes    Nausea, vomiting or diarrhea:  [x] No [] Yes    Fever, sweats or chills: [x] No [] Yes    Leg swelling: [x] No [] Yes      Other medical issues:  No    Home BP: controlled     Review of Systems   A comprehensive review of systems was obtained and negative, except as noted in the HPI or PMH.    Problem List   Patient Active Problem List   Diagnosis     Hypertension secondary to other renal disorders     Obesity, unspecified     Kidney replaced by transplant     Immunosuppressed status (H)     Aftercare following organ transplant     Secondary renal hyperparathyroidism (H)     Vitamin D deficiency     Gastroesophageal reflux disease  without esophagitis     Hypophosphatemia     Latent tuberculosis     Abnormal LFTs     Social History   Social History     Tobacco Use     Smoking status: Former Smoker     Packs/day: 0.20     Years: 15.00     Pack years: 3.00     Types: Cigarettes     Last attempt to quit: 1/1/2016     Years since quitting: 3.3     Smokeless tobacco: Never Used   Substance Use Topics     Alcohol use: Yes     Alcohol/week: 1.8 oz     Types: 3 Standard drinks or equivalent per week     Drug use: No     Allergies   No Known Allergies    Medications   Current Outpatient Medications   Medication Sig     acetic acid (VOSOL) 2 % otic solution Place 4 drops into the right ear 3 times daily     amLODIPine (NORVASC) 10 MG tablet TAKE ONE TABLET BY MOUTH EVERY DAY     cholecalciferol (VITAMIN D) 1000 UNIT tablet Take 2 tablets (2,000 Units) by mouth daily     losartan (COZAAR) 50 MG tablet TAKE ONE TABLET BY MOUTH AT BEDTIME     magnesium oxide (MAG-OX) 400 MG tablet Take 2 tablets (800 mg) by mouth 2 times daily     metoprolol tartrate (LOPRESSOR) 50 MG tablet TAKE ONE TABLET BY MOUTH TWICE A DAY     mycophenolate (GENERIC EQUIVALENT) 250 MG capsule Take 3 capsules (750 mg) by mouth 2 times daily     omeprazole (PRILOSEC) 20 MG CR capsule TAKE ONE CAPSULE BY MOUTH ONCE DAILY     sulfamethoxazole-trimethoprim (BACTRIM/SEPTRA) 400-80 MG tablet Take 1 tablet by mouth three times a week Monday, Wednesday, and Saturday. The dose may change with kidney function (ask nephrologist)     tacrolimus (GENERIC EQUIVALENT) 0.5 MG capsule HOLD (Patient not taking: Reported on 4/26/2018)     tacrolimus (GENERIC EQUIVALENT) 1 MG capsule Take 1 capsule (1 mg) by mouth 2 times daily     tacrolimus (GENERIC EQUIVALENT) 1 MG capsule Take 1 capsule (1 mg) by mouth 2 times daily LABS REQUIRED FOR REFILLS     No current facility-administered medications for this visit.      There are no discontinued medications.    Physical Exam   Vital Signs: There were no vitals  taken for this visit.    GENERAL APPEARANCE: alert and no distress  HENT: mouth without ulcers or lesions  LYMPHATICS: no cervical or supraclavicular nodes  RESP: lungs clear to auscultation - no rales, rhonchi or wheezes  CV: regular rhythm, normal rate, no rub, no murmur  EDEMA: no LE edema bilaterally  ABDOMEN: soft, nondistended, nontender, bowel sounds normal  MS: extremities normal - no gross deformities noted, no evidence of inflammation in joints, no muscle tenderness  SKIN: no rash  TX KIDNEY: normal  DIALYSIS ACCESS:  None non working AVG left arm.     Data     Renal Latest Ref Rng & Units 4/9/2019 3/7/2019 5/22/2018   Na 133 - 144 mmol/L - - -   Na (external) 133 - 144 mmol/L 137 137 138   K 3.4 - 5.3 mmol/L - - -   K (external) 3.4 - 5.1 mmol/L 3.8 3.5 3.8   Cl 94 - 109 mmol/L - - -   Cl (external) 96 - 109 mmol/L 104 103 105   CO2 20 - 32 mmol/L - - -   CO2 (external) 21 - 33 mmol/L 24 23 22   BUN 7 - 30 mg/dL - - -   BUN (external) 6 - 24 mg/dL 10 14 8   Cr 0.52 - 1.04 mg/dL - - -   Cr (external) 0.4 - 1.0 mg/dL 1.0 1.0 1.0   Glucose 70 - 99 mg/dL - - -   Glucose (external) 70 - 99 mg/dL 140(H) 125(H) 115(H)   Ca  8.5 - 10.1 mg/dL - - -   Ca (external) 8.2 - 10.0 mg/dL 8.9 9.5 9.3   Mg 1.6 - 2.3 mg/dL - - -   Mg (external) 1.7 - 2.4 mg/dL - - -     Bone Health Latest Ref Rng & Units 12/18/2017 9/19/2017 9/12/2017   Phos 2.5 - 4.5 mg/dL 2.1(L) - 1.9(L)   Phos (external) 2.6 - 4.7 mg/dL - 1.8(L) -   PTHi 12 - 72 pg/mL 246(H) - 210(H)   Vit D Def 20 - 75 ug/L 28 - -     Heme Latest Ref Rng & Units 4/9/2019 3/7/2019 5/22/2018   WBC 4.0 - 11.0 10e9/L - - -   WBC (external) 4.1 - 10.9 x10:3/uL 2.6(L) 4.2 3.2(L)   Hgb 11.7 - 15.7 g/dL - - -   Hgb (external) 11.7 - 15.5 g/dL 15.0 14.7 13.2   Plt 150 - 450 10e9/L - - -   Plt (external) 150 - 450 x10:3/uL 183 229 213     Liver Latest Ref Rng & Units 5/22/2018 4/24/2018 2/20/2018   AP 40 - 150 U/L - - -   AP (external) 45 - 115 IU/L 92 90 93   TBili 0.2 - 1.3  mg/dL - - -   TBili (external) 0.4 - 1.4 mg/dL 0.8 0.9 0.6   DBili 0.0 - 0.2 mg/dL - - -   DBili (external) 0.0 - 0.3 mg/dL 0.2 0.3 0.2   ALT 0 - 50 U/L - - -   ALT (external) 7 - 55 U/L 178(H) 248(H) 239(H)   AST 0 - 45 U/L - - -   AST (external) 14 - 41 U/L 166(H) 306(H) 114(H)   Tot Protein 6.8 - 8.8 g/dL - - -   Tot Protein (external) 6.2 - 8.2 g/dL 6.9 7.1 6.6   Albumin 3.4 - 5.0 g/dL - - -   Albumin (external) 3.3 - 5.0 g/dL 3.4 3.5 3.7     Pancreas Latest Ref Rng & Units 2/6/2018 2/8/2017 9/10/2016   A1C 4.0 - 6.0 % 5.3 4.0(L) 4.7     Iron studies Latest Ref Rng & Units 2/6/2018 3/30/2017   Iron ug/dL 96 38   Iron sat 15 - 46 % - 17   Ferritin 26 - 252 ng/mL 3,499(A) 823(H)     [facility] Txp Virology Latest Ref Rng & Units 3/7/2019 5/22/2018 4/26/2018   CVM DNA Quant - - - Plasma, EDTA anticoagulant   CMV DNA Quant Ext Undetected IU/mL - <137(A) -   BK Spec - - - -   BK Res BKNEG:BK Virus DNA Not Detected copies/mL - - -   BK Log <2.7 Log copies/mL - - -   BK Quant Result Ext None detected None detected - -   BK Quant Spec Ext - Plasma - -   Hep B Core NEG - - -   Hep B Core Ext - - - -   Hep B Surf - - - -   Hep B Surf Ext  - - - -   HIV 1&2 NEG - - -        Recent Labs   Lab Test 02/20/18  1035 04/24/18  1030 05/22/18  1030   DOSTAC 02/19/18 2030 2030 4/23/18 05/21 2100   TACROL 10.0 5.4 8.8     Recent Labs   Lab Test 02/13/17  0718   DOSMPA Not Provided   MPACID [id number]*   MPAG 216.0*     Attestation:  This patient has been seen and evaluated by me, Eloy Saldivar MD.  I have reviewed the note and agree with plan of care as documented by the fellow.       Kidney/Pancreas Recipient

## 2019-04-23 NOTE — NURSING NOTE
Chief Complaint   Patient presents with     RECHECK     Post TX follow up     Blood pressure 137/85, pulse 73, weight 104.3 kg (230 lb), SpO2 95 %.    Leena Chery, CMA

## 2019-04-23 NOTE — PROGRESS NOTES
CHRONIC TRANSPLANT NEPHROLOGY VISIT    Assessment & Plan   # DDKT: baseline Cr ~ 1.0-1.3; Stable   - Proteinuria: Minimal   - Date DSA Last Checked: 18 Latest DSA: DR52 at 694 MFI   - BK Viremia: No   - Kidney Tx Biopsy: No     The patient has a stable creatinine of 1 mg/dL.  She does have a low level donor specific antibody that we will follow every 6 months.  We will also leave her on higher doses of tacrolimus aiming towards the 6 ng/mL range.    # Immunosuppression: Tacrolimus immediate release (goal 4-6) and Mycophenolate mofetil (goal 1-3.5)   - Changes: No but will keep her tacrolimus level at 4-6 due to low  Level DSA     # Prophylaxis:   - PJP: Sulfa/TMP (Bactrim)    # Hypertension: Controlled; Goal BP: < 130/80   - Volume status: Euvolemic   - Changes: No cont amlodipine 10mg daily, metoprolol 50mg bid, losartan 50mg daily    # Post-Transplant Erythrocytosis: Hgb: Increased now is  Hgb 15    # Mineral Bone Disorder:   - Secondary renal hyperparathyroidism; PTH level is: Not checked recently  - Vitamin D; level is: Not checked recently  - Calcium; level is: High  - Phosphorus; level is: Not checked recently     # Electrolytes:   - Potassium; level: Normal  - Magnesium; level: Normal  - Bicarbonate; level: Normal  - Sodium; level: Normal    # 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    # transaminitis: trending down, will check with next labs     # Medical Compliance: Yes    # Transplant History:  Etiology of kidney failure: unknown etiology  Tx: DDKT  Transplant: 2017 (Kidney)  Donor Type:  - Brain Death Donor Class: Standard Criteria Donor  CMV detected    Significant changes in immunosuppression: None  Significant transplant-related complications: CMV Viremia    Transplant Office Phone Number:  [phone]    Assessment and plan was discussed with the patient and she voiced her understanding and agreement.    Return visit: every 12 months.     Kymberly Birch MD   Patient seen and discussed  with Dr. Saldivar  Nephrology Fellow  Pager: [phone]    Chief Complaint   Ms. Romero is a 53 year old here for routine follow up.    History of Present Illness   Since last visit, she has no specific complaint, appetite is good and no nausea or vomiting no chest pain or sob, no leg swelling no diarrhea or constipation no headache, no lightheadedness, she gain 15lbs since last year, she said her BP is controlled at home around 130/80.     Review Of Systems  Skin: negative  Eyes: negative  Ears/Nose/Throat: negative  Respiratory: No shortness of breath, dyspnea on exertion, cough, or hemoptysis  Cardiovascular: negative  Gastrointestinal: negative  Genitourinary: negative  Musculoskeletal: negative  Neurologic: negative  Psychiatric: negative  Hematologic/Lymphatic/Immunologic: negative  Endocrine: negative      Recent Hospitalizations:  [x] No [] Yes    New Medical Issues: [x] No [] Yes    Decreased energy: [x] No [] Yes    Chest pain or SOB with exertion:  [x] No [] Yes    Appetite change or weight change: [] No [x] Yes    Nausea, vomiting or diarrhea:  [x] No [] Yes    Fever, sweats or chills: [x] No [] Yes    Leg swelling: [x] No [] Yes      Other medical issues:  No    Home BP: controlled     Review of Systems   A comprehensive review of systems was obtained and negative, except as noted in the HPI or PMH.    Problem List   Patient Active Problem List   Diagnosis     Hypertension secondary to other renal disorders     Obesity, unspecified     Kidney replaced by transplant     Immunosuppressed status (H)     Aftercare following organ transplant     Secondary renal hyperparathyroidism (H)     Vitamin D deficiency     Gastroesophageal reflux disease without esophagitis     Hypophosphatemia     Latent tuberculosis      Abnormal LFTs     Social History   Social History     Tobacco Use     Smoking status: Former Smoker     Packs/day: 0.20     Years: 15.00     Pack years: 3.00     Types: Cigarettes     Last attempt to quit: 1/1/2016     Years since quitting: 3.3     Smokeless tobacco: Never Used   Substance Use Topics     Alcohol use: Yes     Alcohol/week: 1.8 oz     Types: 3 Standard drinks or equivalent per week     Drug use: No     Allergies   No Known Allergies    Medications   Current Outpatient Medications   Medication Sig     acetic acid (VOSOL) 2 % otic solution Place 4 drops into the right ear 3 times daily     amLODIPine (NORVASC) 10 MG tablet TAKE ONE TABLET BY MOUTH EVERY DAY     cholecalciferol (VITAMIN D) 1000 UNIT tablet Take 2 tablets (2,000 Units) by mouth daily     losartan (COZAAR) 50 MG tablet TAKE ONE TABLET BY MOUTH AT BEDTIME     magnesium oxide (MAG-OX) 400 MG tablet Take 2 tablets (800 mg) by mouth 2 times daily     metoprolol tartrate (LOPRESSOR) 50 MG tablet TAKE ONE TABLET BY MOUTH TWICE A DAY     mycophenolate (GENERIC EQUIVALENT) 250 MG capsule Take 3 capsules (750 mg) by mouth 2 times daily     omeprazole (PRILOSEC) 20 MG CR capsule TAKE ONE CAPSULE BY MOUTH ONCE DAILY     sulfamethoxazole-trimethoprim (BACTRIM/SEPTRA) 400-80 MG tablet Take 1 tablet by mouth three times a week Monday, Wednesday, and Saturday. The dose may change with kidney function (ask nephrologist)     tacrolimus (GENERIC EQUIVALENT) 0.5 MG capsule HOLD (Patient not taking: Reported on 4/26/2018)     tacrolimus (GENERIC EQUIVALENT) 1 MG capsule Take 1 capsule (1 mg) by mouth 2 times daily     tacrolimus (GENERIC EQUIVALENT) 1 MG capsule Take 1 capsule (1 mg) by mouth 2 times daily LABS REQUIRED FOR REFILLS     No current facility-administered medications for this visit.      There are no discontinued medications.    Physical Exam   Vital Signs: There were no vitals taken for this visit.    GENERAL APPEARANCE: alert and no  distress  HENT: mouth without ulcers or lesions  LYMPHATICS: no cervical or supraclavicular nodes  RESP: lungs clear to auscultation - no rales, rhonchi or wheezes  CV: regular rhythm, normal rate, no rub, no murmur  EDEMA: no LE edema bilaterally  ABDOMEN: soft, nondistended, nontender, bowel sounds normal  MS: extremities normal - no gross deformities noted, no evidence of inflammation in joints, no muscle tenderness  SKIN: no rash  TX KIDNEY: normal  DIALYSIS ACCESS:  None non working AVG left arm.     Data     Renal Latest Ref Rng & Units 4/9/2019 3/7/2019 5/22/2018   Na 133 - 144 mmol/L - - -   Na (external) 133 - 144 mmol/L 137 137 138   K 3.4 - 5.3 mmol/L - - -   K (external) 3.4 - 5.1 mmol/L 3.8 3.5 3.8   Cl 94 - 109 mmol/L - - -   Cl (external) 96 - 109 mmol/L 104 103 105   CO2 20 - 32 mmol/L - - -   CO2 (external) 21 - 33 mmol/L 24 23 22   BUN 7 - 30 mg/dL - - -   BUN (external) 6 - 24 mg/dL 10 14 8   Cr 0.52 - 1.04 mg/dL - - -   Cr (external) 0.4 - 1.0 mg/dL 1.0 1.0 1.0   Glucose 70 - 99 mg/dL - - -   Glucose (external) 70 - 99 mg/dL 140(H) 125(H) 115(H)   Ca  8.5 - 10.1 mg/dL - - -   Ca (external) 8.2 - 10.0 mg/dL 8.9 9.5 9.3   Mg 1.6 - 2.3 mg/dL - - -   Mg (external) 1.7 - 2.4 mg/dL - - -     Bone Health Latest Ref Rng & Units 12/18/2017 9/19/2017 9/12/2017   Phos 2.5 - 4.5 mg/dL 2.1(L) - 1.9(L)   Phos (external) 2.6 - 4.7 mg/dL - 1.8(L) -   PTHi 12 - 72 pg/mL 246(H) - 210(H)   Vit D Def 20 - 75 ug/L 28 - -     Heme Latest Ref Rng & Units 4/9/2019 3/7/2019 5/22/2018   WBC 4.0 - 11.0 10e9/L - - -   WBC (external) 4.1 - 10.9 x10:3/uL 2.6(L) 4.2 3.2(L)   Hgb 11.7 - 15.7 g/dL - - -   Hgb (external) 11.7 - 15.5 g/dL 15.0 14.7 13.2   Plt 150 - 450 10e9/L - - -   Plt (external) 150 - 450 x10:3/uL 183 229 213     Liver Latest Ref Rng & Units 5/22/2018 4/24/2018 2/20/2018   AP 40 - 150 U/L - - -   AP (external) 45 - 115 IU/L 92 90 93   TBili 0.2 - 1.3 mg/dL - - -   TBili (external) 0.4 - 1.4 mg/dL 0.8 0.9 0.6    DBili 0.0 - 0.2 mg/dL - - -   DBili (external) 0.0 - 0.3 mg/dL 0.2 0.3 0.2   ALT 0 - 50 U/L - - -   ALT (external) 7 - 55 U/L 178(H) 248(H) 239(H)   AST 0 - 45 U/L - - -   AST (external) 14 - 41 U/L 166(H) 306(H) 114(H)   Tot Protein 6.8 - 8.8 g/dL - - -   Tot Protein (external) 6.2 - 8.2 g/dL 6.9 7.1 6.6   Albumin 3.4 - 5.0 g/dL - - -   Albumin (external) 3.3 - 5.0 g/dL 3.4 3.5 3.7     Pancreas Latest Ref Rng & Units 2/6/2018 2/8/2017 9/10/2016   A1C 4.0 - 6.0 % 5.3 4.0(L) 4.7     Iron studies Latest Ref Rng & Units 2/6/2018 3/30/2017   Iron ug/dL 96 38   Iron sat 15 - 46 % - 17   Ferritin 26 - 252 ng/mL 3,499(A) 823(H)     [facility] Txp Virology Latest Ref Rng & Units 3/7/2019 5/22/2018 4/26/2018   CVM DNA Quant - - - Plasma, EDTA anticoagulant   CMV DNA Quant Ext Undetected IU/mL - <137(A) -   BK Spec - - - -   BK Res BKNEG:BK Virus DNA Not Detected copies/mL - - -   BK Log <2.7 Log copies/mL - - -   BK Quant Result Ext None detected None detected - -   BK Quant Spec Ext - Plasma - -   Hep B Core NEG - - -   Hep B Core Ext - - - -   Hep B Surf - - - -   Hep B Surf Ext  - - - -   HIV 1&2 NEG - - -        Recent Labs   Lab Test 02/20/18  1035 04/24/18  1030 05/22/18  1030   DOSTAC 02/19/18 2030 2030 4/23/18 05/21 2100   TACROL 10.0 5.4 8.8     Recent Labs   Lab Test 02/13/17  0718   DOSMPA Not Provided   MPACID [id number]*   MPAG 216.0*     Attestation:  This patient has been seen and evaluated by me, Eloy Saldivar MD.  I have reviewed the note and agree with plan of care as documented by the fellow.

## 2019-04-23 NOTE — NURSING NOTE
Angelica Romero was seen today in clinic by this writer. Medications, lab orders, lab frequency, and necessary follow up discussed with patient. Patient was provided with a copy of the current lab letter and contact information for transplant coordinator. Patient voiced understanding and agreement of education and plan.     Yulissa Armenta

## 2019-04-24 NOTE — LETTER
PHYSICIAN ORDERS      DATE & TIME ISSUED: 2019 3:55 PM  PATIENT NAME: Angelica Romero   : 1965     [facility] MR# [if applicable]: [id number]     DIAGNOSIS:  Kidney transplanted  ICD-10 CODE: Z94.0    Please collect the following with patient's next set of labs:    CMV DNA Quant  LFTs     Any questions please call: Transplant Office [phone] option #5    Fax results to:   [phone].

## 2019-04-24 NOTE — LETTER
[facility]  Room 2[address]200  [facility],  [address]  [address]  Tel [phone]  Toll Free [phone]                OUTPATIENT LABORATORY TEST ORDER    Patient Name: Angelica Romero  Transplant Date: 2/8/2017 (Kidney)  YOB: 1965  Issue Date & Time:April 24, 20194:26 PM    [facility] MR:  [id number]  Exp. Date (1 year after date issued)      Diagnoses: Kidney Transplant (ICD-9  V42.0)   Long term use of medications (ICD-9  V58.69)     Lab results to be available on the same day drawn.   Patient should release information to the [facility], [facility].  Please fax to the Transplant Center at [phone].    Every other month   ?Hemogram and Platelet  ?Basic Metabolic Panel (Sodium, Potassium, Chloride, CO2, Creatinine, Urea Nitrogen, Glucose, Calcium)           ?/Tacrolimus/Prograf drug level                   Every 6 Months Due:                                          ?Urine for protein/creatinine   ? PRA/DSA level (mailers provided by the patient)     HIV, HBsAb, HBcAb, HCV  If you have any questions, please call [facility] at [phone] or [phone].    Please fax labs to [phone]

## 2019-05-29 DIAGNOSIS — Z48.298 AFTERCARE FOLLOWING ORGAN TRANSPLANT: ICD-10-CM

## 2019-05-29 DIAGNOSIS — Z94.0 DECEASED-DONOR KIDNEY TRANSPLANT: ICD-10-CM

## 2019-05-29 DIAGNOSIS — I10 ESSENTIAL HYPERTENSION: ICD-10-CM

## 2019-05-29 DIAGNOSIS — Z94.0 KIDNEY REPLACED BY TRANSPLANT: Primary | ICD-10-CM

## 2019-05-29 RX ORDER — TACROLIMUS 1 MG/1
1 CAPSULE ORAL 2 TIMES DAILY
Qty: 60 CAPSULE | Refills: 3 | Status: SHIPPED | OUTPATIENT
Start: 2019-05-29 | End: 2019-10-03

## 2019-07-02 DIAGNOSIS — Z94.0 DECEASED-DONOR KIDNEY TRANSPLANT: ICD-10-CM

## 2019-07-02 DIAGNOSIS — Z48.298 AFTERCARE FOLLOWING ORGAN TRANSPLANT: ICD-10-CM

## 2019-07-02 DIAGNOSIS — Z94.0 KIDNEY REPLACED BY TRANSPLANT: Primary | ICD-10-CM

## 2019-07-02 RX ORDER — SULFAMETHOXAZOLE AND TRIMETHOPRIM 400; 80 MG/1; MG/1
1 TABLET ORAL
Qty: 13 TABLET | Refills: 3 | Status: SHIPPED | OUTPATIENT
Start: 2019-07-02 | End: 2019-11-04

## 2019-07-02 RX ORDER — MYCOPHENOLATE MOFETIL 250 MG/1
750 CAPSULE ORAL 2 TIMES DAILY
Qty: 180 CAPSULE | Refills: 3 | Status: SHIPPED | OUTPATIENT
Start: 2019-07-02 | End: 2019-11-04

## 2019-10-03 DIAGNOSIS — Z94.0 KIDNEY REPLACED BY TRANSPLANT: Primary | ICD-10-CM

## 2019-10-03 DIAGNOSIS — Z94.0 DECEASED-DONOR KIDNEY TRANSPLANT: ICD-10-CM

## 2019-10-03 DIAGNOSIS — Z48.298 AFTERCARE FOLLOWING ORGAN TRANSPLANT: ICD-10-CM

## 2019-10-03 DIAGNOSIS — I10 ESSENTIAL HYPERTENSION: ICD-10-CM

## 2019-10-03 RX ORDER — TACROLIMUS 1 MG/1
1 CAPSULE ORAL 2 TIMES DAILY
Qty: 60 CAPSULE | Refills: 11 | Status: SHIPPED | OUTPATIENT
Start: 2019-10-03 | End: 2020-10-02

## 2019-11-04 DIAGNOSIS — Z94.0 KIDNEY REPLACED BY TRANSPLANT: Primary | ICD-10-CM

## 2019-11-04 DIAGNOSIS — Z94.0 DECEASED-DONOR KIDNEY TRANSPLANT: ICD-10-CM

## 2019-11-04 DIAGNOSIS — Z48.298 AFTERCARE FOLLOWING ORGAN TRANSPLANT: ICD-10-CM

## 2019-11-04 RX ORDER — MYCOPHENOLATE MOFETIL 250 MG/1
750 CAPSULE ORAL 2 TIMES DAILY
Qty: 180 CAPSULE | Refills: 11 | Status: SHIPPED | OUTPATIENT
Start: 2019-11-04 | End: 2020-11-09

## 2019-11-04 RX ORDER — SULFAMETHOXAZOLE AND TRIMETHOPRIM 400; 80 MG/1; MG/1
1 TABLET ORAL
Qty: 13 TABLET | Refills: 11 | Status: SHIPPED | OUTPATIENT
Start: 2019-11-05 | End: 2020-11-09

## 2019-11-05 DIAGNOSIS — I15.1 HYPERTENSION SECONDARY TO OTHER RENAL DISORDERS: ICD-10-CM

## 2019-11-06 RX ORDER — LOSARTAN POTASSIUM 50 MG/1
TABLET ORAL
Qty: 90 TABLET | Refills: 3 | Status: SHIPPED | OUTPATIENT
Start: 2019-11-06

## 2019-11-06 NOTE — TELEPHONE ENCOUNTER
Last Office Visit with Nephrologist:  4/23/19.  Medication refilled per Nephrology Clinic protocol.     Mahsa Marroquin RN

## 2020-02-05 ENCOUNTER — TELEPHONE (OUTPATIENT)
Dept: PHARMACY | Facility: CLINIC | Age: 55
End: 2020-02-05

## 2020-02-05 NOTE — TELEPHONE ENCOUNTER
could not contact for 36 month med review.  Appears compliant last tacro level at goal.    Oskar Gonzalez [facility]  Specialty Pharmacist [phone]

## 2020-03-02 ENCOUNTER — HEALTH MAINTENANCE LETTER (OUTPATIENT)
Age: 55
End: 2020-03-02

## 2020-03-09 ENCOUNTER — TELEPHONE (OUTPATIENT)
Dept: TRANSPLANT | Facility: CLINIC | Age: 55
End: 2020-03-09

## 2020-03-09 DIAGNOSIS — Z79.899 ENCOUNTER FOR LONG-TERM CURRENT USE OF MEDICATION: ICD-10-CM

## 2020-03-09 DIAGNOSIS — Z94.0 KIDNEY REPLACED BY TRANSPLANT: Primary | ICD-10-CM

## 2020-03-09 DIAGNOSIS — Z48.298 AFTERCARE FOLLOWING ORGAN TRANSPLANT: ICD-10-CM

## 2020-03-09 NOTE — LETTER
OUTPATIENT LABORATORY TEST ORDER    Patient Name: Angelica Romero  Transplant Date: 2/8/2017   YOB: 1965  Issue Date & Time: 3/9/2020  3:36 PM  [facility] MR: [id number] Exp. Date (1 year after date issued)      Diagnoses: Kidney Transplant (ICD-10  Z94.0)   Long term use of medications (ICD-10  Z79.899)     Lab results to be available on the same day drawn.   Patient should release information to the [facility].  Please fax to the Transplant Center at [phone].    Every other month   ?Hemogram and Platelet  ?Basic Metabolic Panel (Sodium, Potassium, Chloride, CO2, Creatinine, Urea Nitrogen,     Glucose,   Calcium)         ?/Tacrolimus/Prograf drug level                          Every 6 Months                  ?PRA/DSA level (mailers provided by the patient)                ?Urine for protein/creatinine      If you have any questions, please call [facility] at [phone] or [phone].    Please fax labs to [phone]  .

## 2020-03-09 NOTE — TELEPHONE ENCOUNTER
Provider Call: Transplant Lab/Orders  Route to LPN  Post Transplant Days: 1125  When patient is less than 60 days post-transplant, route high priority  Reason for Call: Annual lab reorder expires end of April  Liver patients reporting abnormal lab results: Route to RN and Page  Document lab facility information when provider is calling about annual lab orders. Delete facility wildcards when not needed.  Facility Name: [facility]  Facility Location: [facility] Fax Number: [phone]  Callback needed? No

## 2020-04-09 ENCOUNTER — RESULTS ONLY (OUTPATIENT)
Dept: OTHER | Facility: CLINIC | Age: 55
End: 2020-04-09

## 2020-04-09 DIAGNOSIS — Z94.0 KIDNEY REPLACED BY TRANSPLANT: ICD-10-CM

## 2020-04-09 DIAGNOSIS — Z79.899 ENCOUNTER FOR LONG-TERM CURRENT USE OF MEDICATION: ICD-10-CM

## 2020-04-09 DIAGNOSIS — Z48.298 AFTERCARE FOLLOWING ORGAN TRANSPLANT: ICD-10-CM

## 2020-04-09 PROCEDURE — 86833 HLA CLASS II HIGH DEFIN QUAL: CPT | Performed by: TRANSPLANT SURGERY

## 2020-04-09 PROCEDURE — 86832 HLA CLASS I HIGH DEFIN QUAL: CPT | Performed by: TRANSPLANT SURGERY

## 2020-04-10 ENCOUNTER — TELEPHONE (OUTPATIENT)
Dept: TRANSPLANT | Facility: CLINIC | Age: 55
End: 2020-04-10

## 2020-04-13 ENCOUNTER — VIRTUAL VISIT (OUTPATIENT)
Dept: NEPHROLOGY | Facility: CLINIC | Age: 55
End: 2020-04-13
Attending: INTERNAL MEDICINE
Payer: MEDICARE

## 2020-04-13 DIAGNOSIS — Z94.0 STATUS POST KIDNEY TRANSPLANT: ICD-10-CM

## 2020-04-13 DIAGNOSIS — R80.9 PROTEINURIA, UNSPECIFIED TYPE: ICD-10-CM

## 2020-04-13 DIAGNOSIS — Z94.0 KIDNEY REPLACED BY TRANSPLANT: ICD-10-CM

## 2020-04-13 DIAGNOSIS — D84.9 IMMUNOSUPPRESSION (H): ICD-10-CM

## 2020-04-13 DIAGNOSIS — Z48.298 AFTERCARE FOLLOWING ORGAN TRANSPLANT: ICD-10-CM

## 2020-04-13 DIAGNOSIS — R73.01 IMPAIRED FASTING GLUCOSE: Primary | ICD-10-CM

## 2020-04-13 DIAGNOSIS — Z94.0 DECEASED-DONOR KIDNEY TRANSPLANT: ICD-10-CM

## 2020-04-13 DIAGNOSIS — Z12.83 SKIN CANCER SCREENING: ICD-10-CM

## 2020-04-13 DIAGNOSIS — Z94.0 HTN, KIDNEY TRANSPLANT RELATED: ICD-10-CM

## 2020-04-13 DIAGNOSIS — I10 ESSENTIAL HYPERTENSION: ICD-10-CM

## 2020-04-13 DIAGNOSIS — K21.9 GASTROESOPHAGEAL REFLUX DISEASE, ESOPHAGITIS PRESENCE NOT SPECIFIED: ICD-10-CM

## 2020-04-13 DIAGNOSIS — I15.1 HTN, KIDNEY TRANSPLANT RELATED: ICD-10-CM

## 2020-04-13 RX ORDER — METOPROLOL TARTRATE 50 MG
50 TABLET ORAL 2 TIMES DAILY
Qty: 60 TABLET | Refills: 11 | Status: SHIPPED | OUTPATIENT
Start: 2020-04-13 | End: 2021-05-11

## 2020-04-13 NOTE — PROGRESS NOTES
"TRANSPLANT NEPHROLOGY TELEMEDICINE VISIT    Angelica Romero is a 54 year old female who is being evaluated via a billable telemedicine (video/telephone) visit on April 13, 2020.     The patient has been notified of following:     \"This telemedicine (video/telephone) visit will be conducted via a video/phone call between you and your physician. We have found that certain health care needs can be provided without the need for a physical exam.  This service lets us provide the care you need with a short video/phone conversation.  If a prescription is necessary, we can send it directly to your pharmacy.  If lab work is needed, we can place an order for that and you can then stop by our lab to have the test done at a later time.    If during the course of this video/phone call the physician feels a telemedicine (video/telephone) visit is not appropriate, you will not be charged for this service and an in clinic visit will be arranged at a later date.\"     Assessment & Plan   # DDKT: Stable   - Baseline Cr ~ 1 mg / dl   - Proteinuria: Mild (0.5-1.0 grams)   - Date DSA Last Checked: 1.9/2018 with DR52 at 694 mfi   - BK Viremia: No   - Kidney Tx Biopsy: No    # Immunosuppression: Tacrolimus immediate release (goal 4-6) and Mycophenolate mofetil (goal not followed)   - Changes: No     Tac last 5.6 ng / ml    # Infection Prophylaxis:   - PJP: Sulfa/TMP (Bactrim)    # Hypertension: Controlled;  Goal BP: < 130/80   - Changes: No    # Mineral Bone Disorder:    in 2017, vit d 36 last, calcium 9.4 mg / dl      # Skin Cancer Risk:    - Discussed sun protection and recommend regular follow up with Dermatology.    # Medical Compliance: Yes    # COVID-19 Virus Review: Discussed COVID-19 virus and the potential medical risks.  Reviewed preventative health recommendations, which includes washing hands for 20 seconds, avoid touching your face, and social distancing.  Asked patient to inform the transplant center if they are exposed " or diagnosed with this virus.    #reflux: restart omeprazole    #impaired fasting glucose: hemoglobin a1c with next labs.     # Transplant History:  Etiology of Kidney Failure: Hypertension  Tx: DDKT  Significant changes in immunosuppression: None  Significant transplant-related complications: None    Transplant Office Phone Number: [phone]    Assessment and plan was discussed with the patient and he voiced his understanding and agreement.    Return Visit: 12 months    Angelica Romero has a clinical telephone visit for kidney transplant and immunosuppression management.    History of Present Illness     The patient is a 54-year-old female with history of end-stage kidney disease due to hypertension who is status post  donor kidney transplant in 2017 who is receiving this telephone call for follow-up of her kidney transplant and immunosuppression management.    Overall the patient has been feeling well.  Her most recent labs from the ninth of this month showed a creatinine of 1 mg/dL which is her baseline.  The patient is on tacrolimus and CellCept for immunosuppression.  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    The patient's blood pressures have been in the 130s systolically.    The patient does note that she has had recurrence of her gastroesophageal reflux disease symptoms and would like to restart omeprazole which I have sent to her local pharmacy.  Recent Hospitalizations:  [x] No [] Yes    New Medical Issues: [x] No [] Yes    Decreased energy: [x] No [] Yes    Chest pain or SOB with exertion:  [x] No [] Yes    Appetite change or weight change: [x] No [] Yes    Nausea, vomiting or diarrhea:  [x] No [] Yes    Fever, sweats or chills: [x] No [] Yes    Leg swelling: [x] No [] Yes      Home BP: 130s    Review of Systems   A comprehensive review of systems was obtained and negative, except as noted in the HPI or PMH.    I have reviewed and  updated the patient's Past Medical History, Social History, and Medication List.    Active Medical Problems  Patient Active Problem List   Diagnosis     Hypertension secondary to other renal disorders     Obesity, unspecified     Kidney replaced by transplant     Immunosuppressed status (H)     Aftercare following organ transplant     Secondary renal hyperparathyroidism (H)     Vitamin D deficiency     Gastroesophageal reflux disease without esophagitis     Hypophosphatemia     Latent tuberculosis     Abnormal LFTs     Allergies  Patient has no known allergies.    Medications  Current Outpatient Medications   Medication Sig     acetic acid (VOSOL) 2 % otic solution Place 4 drops into the right ear 3 times daily     amLODIPine (NORVASC) 10 MG tablet Take 1 tablet (10 mg) by mouth daily     cholecalciferol (VITAMIN D) 1000 UNIT tablet Take 2 tablets (2,000 Units) by mouth daily     losartan (COZAAR) 50 MG tablet TAKE 1 TABLET BY MOUTH AT BEDTIME     magnesium oxide (MAG-OX) 400 MG tablet Take 2 tablets (800 mg) by mouth 2 times daily     metoprolol tartrate (LOPRESSOR) 50 MG tablet Take 1 tablet (50 mg) by mouth 2 times daily     mycophenolate (GENERIC EQUIVALENT) 250 MG capsule Take 3 capsules (750 mg) by mouth 2 times daily     omeprazole (PRILOSEC) 20 MG CR capsule TAKE ONE CAPSULE BY MOUTH ONCE DAILY     sulfamethoxazole-trimethoprim (BACTRIM/SEPTRA) 400-80 MG tablet Take 1 tablet by mouth three times a week Monday, Wednesday, and Saturday. The dose may change with kidney function (ask nephrologist)     tacrolimus (GENERIC EQUIVALENT) 0.5 MG capsule HOLD     tacrolimus (GENERIC EQUIVALENT) 1 MG capsule Take 1 capsule (1 mg) by mouth 2 times daily LABS REQUIRED FOR REFILLS     tacrolimus (GENERIC EQUIVALENT) 1 MG capsule Take 1 capsule (1 mg) by mouth 2 times daily     No current facility-administered medications for this visit.      Phone call contact time:  Call Started at 1230  Call Ended at 1252    Eloy OLIVER  MD Yariel

## 2020-04-14 LAB
DONOR IDENTIFICATION: NORMAL
DSA COMMENTS: NORMAL
DSA PRESENT: NO
DSA TEST METHOD: NORMAL
ORGAN: NORMAL
SA1 CELL: NORMAL
SA1 COMMENTS: NORMAL
SA1 HI RISK ABY: NORMAL
SA1 MOD RISK ABY: NORMAL
SA1 TEST METHOD: NORMAL
SA2 CELL: NORMAL
SA2 COMMENTS: NORMAL
SA2 HI RISK ABY UA: NORMAL
SA2 MOD RISK ABY: NORMAL
SA2 TEST METHOD: NORMAL
UNACCEPTABLE ANTIGEN: NORMAL
UNOS CPRA: 77

## 2020-04-30 DIAGNOSIS — I10 ESSENTIAL HYPERTENSION: ICD-10-CM

## 2020-04-30 DIAGNOSIS — Z48.298 AFTERCARE FOLLOWING ORGAN TRANSPLANT: ICD-10-CM

## 2020-04-30 DIAGNOSIS — Z94.0 KIDNEY REPLACED BY TRANSPLANT: ICD-10-CM

## 2020-04-30 DIAGNOSIS — Z94.0 DECEASED-DONOR KIDNEY TRANSPLANT: ICD-10-CM

## 2020-04-30 RX ORDER — AMLODIPINE BESYLATE 10 MG/1
10 TABLET ORAL DAILY
Qty: 30 TABLET | Refills: 11 | Status: SHIPPED | OUTPATIENT
Start: 2020-04-30 | End: 2021-05-11

## 2020-10-02 DIAGNOSIS — Z94.0 KIDNEY REPLACED BY TRANSPLANT: ICD-10-CM

## 2020-10-02 DIAGNOSIS — Z48.298 AFTERCARE FOLLOWING ORGAN TRANSPLANT: ICD-10-CM

## 2020-10-02 DIAGNOSIS — I10 ESSENTIAL HYPERTENSION: ICD-10-CM

## 2020-10-02 DIAGNOSIS — Z94.0 DECEASED-DONOR KIDNEY TRANSPLANT: ICD-10-CM

## 2020-10-02 RX ORDER — TACROLIMUS 1 MG/1
1 CAPSULE ORAL 2 TIMES DAILY
Qty: 60 CAPSULE | Refills: 0 | Status: SHIPPED | OUTPATIENT
Start: 2020-10-02 | End: 2020-11-09

## 2020-11-09 DIAGNOSIS — Z94.0 DECEASED-DONOR KIDNEY TRANSPLANT: Primary | ICD-10-CM

## 2020-11-09 DIAGNOSIS — I10 ESSENTIAL HYPERTENSION: ICD-10-CM

## 2020-11-09 DIAGNOSIS — Z48.298 AFTERCARE FOLLOWING ORGAN TRANSPLANT: ICD-10-CM

## 2020-11-09 DIAGNOSIS — Z94.0 KIDNEY REPLACED BY TRANSPLANT: ICD-10-CM

## 2020-11-09 RX ORDER — MYCOPHENOLATE MOFETIL 250 MG/1
750 CAPSULE ORAL 2 TIMES DAILY
Qty: 180 CAPSULE | Refills: 1 | Status: SHIPPED | OUTPATIENT
Start: 2020-11-09 | End: 2020-12-30

## 2020-11-09 RX ORDER — SULFAMETHOXAZOLE AND TRIMETHOPRIM 400; 80 MG/1; MG/1
1 TABLET ORAL
Qty: 13 TABLET | Refills: 1 | Status: SHIPPED | OUTPATIENT
Start: 2020-11-10 | End: 2020-12-30

## 2020-11-09 RX ORDER — TACROLIMUS 1 MG/1
1 CAPSULE ORAL 2 TIMES DAILY
Qty: 60 CAPSULE | Refills: 1 | Status: SHIPPED | OUTPATIENT
Start: 2020-11-09 | End: 2020-12-30

## 2020-11-09 NOTE — TELEPHONE ENCOUNTER
Issue:  Overdue for transplant labs    Plan:  Call Angelica:  Request a full set of transplant labs, including a good drug level, be drawn in the next 1-2 weeks.  Educate the patient on the importance of lab compliance in monitoring the health of their transplant.    Outcome  Called Angelica to discuss above plan. Angelica is agreeable to states she will set up a lab appointment today.

## 2020-12-02 ENCOUNTER — TELEPHONE (OUTPATIENT)
Dept: TRANSPLANT | Facility: CLINIC | Age: 55
End: 2020-12-02

## 2020-12-02 NOTE — TELEPHONE ENCOUNTER
Called Angelica and discussed her elevated blood sugar of 326.    She is not on any medications and does not check her blood sugars at home.    She is calling her PCP today for their recommendations.

## 2020-12-14 ENCOUNTER — HEALTH MAINTENANCE LETTER (OUTPATIENT)
Age: 55
End: 2020-12-14

## 2020-12-30 DIAGNOSIS — I10 ESSENTIAL HYPERTENSION: ICD-10-CM

## 2020-12-30 DIAGNOSIS — Z94.0 DECEASED-DONOR KIDNEY TRANSPLANT: ICD-10-CM

## 2020-12-30 DIAGNOSIS — Z22.7 LATENT TUBERCULOSIS BY BLOOD TEST: ICD-10-CM

## 2020-12-30 DIAGNOSIS — Z94.0 KIDNEY REPLACED BY TRANSPLANT: Primary | ICD-10-CM

## 2020-12-30 DIAGNOSIS — Z48.298 AFTERCARE FOLLOWING ORGAN TRANSPLANT: ICD-10-CM

## 2020-12-30 RX ORDER — MYCOPHENOLATE MOFETIL 250 MG/1
750 CAPSULE ORAL 2 TIMES DAILY
Qty: 180 CAPSULE | Refills: 1 | Status: SHIPPED | OUTPATIENT
Start: 2020-12-30 | End: 2021-01-04

## 2020-12-30 RX ORDER — TACROLIMUS 1 MG/1
1 CAPSULE ORAL 2 TIMES DAILY
Qty: 60 CAPSULE | Refills: 0 | OUTPATIENT
Start: 2020-12-30

## 2020-12-30 RX ORDER — TACROLIMUS 1 MG/1
1 CAPSULE ORAL 2 TIMES DAILY
Qty: 60 CAPSULE | Refills: 1 | Status: SHIPPED | OUTPATIENT
Start: 2020-12-30 | End: 2021-01-04

## 2020-12-30 RX ORDER — MYCOPHENOLATE MOFETIL 250 MG/1
750 CAPSULE ORAL 2 TIMES DAILY
Qty: 180 CAPSULE | Refills: 0 | OUTPATIENT
Start: 2020-12-30

## 2020-12-30 RX ORDER — SULFAMETHOXAZOLE AND TRIMETHOPRIM 400; 80 MG/1; MG/1
1 TABLET ORAL
Qty: 13 TABLET | Refills: 1 | Status: SHIPPED | OUTPATIENT
Start: 2020-12-30 | End: 2021-03-04

## 2021-01-04 ENCOUNTER — TELEPHONE (OUTPATIENT)
Dept: TRANSPLANT | Facility: CLINIC | Age: 56
End: 2021-01-04

## 2021-01-04 DIAGNOSIS — Z94.0 DECEASED-DONOR KIDNEY TRANSPLANT: ICD-10-CM

## 2021-01-04 DIAGNOSIS — I10 ESSENTIAL HYPERTENSION: ICD-10-CM

## 2021-01-04 DIAGNOSIS — Z48.298 AFTERCARE FOLLOWING ORGAN TRANSPLANT: ICD-10-CM

## 2021-01-04 DIAGNOSIS — Z94.0 KIDNEY REPLACED BY TRANSPLANT: ICD-10-CM

## 2021-01-04 RX ORDER — MYCOPHENOLATE MOFETIL 250 MG/1
750 CAPSULE ORAL 2 TIMES DAILY
Qty: 180 CAPSULE | Refills: 1 | Status: SHIPPED | OUTPATIENT
Start: 2021-01-04 | End: 2021-03-04

## 2021-01-04 RX ORDER — TACROLIMUS 1 MG/1
1 CAPSULE ORAL 2 TIMES DAILY
Qty: 60 CAPSULE | Refills: 1 | Status: SHIPPED | OUTPATIENT
Start: 2021-01-04 | End: 2021-03-04

## 2021-01-04 NOTE — TELEPHONE ENCOUNTER
Medication/Refill approved per CPA:    [facility] COLLABORATIVE AGREEMENT FOR  IMMUNOSUPPRESSENT PRESCRIPTION MODIFICATION.      Routing encounter to Transplant as an FYI.    Thanks,  Ksenia White, PharmD  Specialty Pharmacist/Transplant  [facility]  [phone]

## 2021-01-21 ENCOUNTER — TELEPHONE (OUTPATIENT)
Dept: PHARMACY | Facility: CLINIC | Age: 56
End: 2021-01-21

## 2021-01-21 NOTE — TELEPHONE ENCOUNTER
Clinical Pharmacy Consult:                                                      Transplant Specific: 48 month Post Transplant Medication Review  Date of Transplant: 02/08/2017  Type of Transplant: kidney  First Transplant: yes  History of rejection: no    Immunosuppression Regimen   TAC 1mg qAM & 1mg qPM and MMF 750mg qAM & 750mg qPM  Patient specific goal: 4-6  Most recent level: 5.6, date 4/9/20  Immunosuppressant Levels:  Therapeutic  Pt adherent to lab draws: yes  Scr:   Lab Results   Component Value Date    CR 1.00 12/18/2017     Side effects: no side effects    Prophylactic Medications  Antibacterial:  Bactrim ss 3 times a week  Scheduled Discontinue Date: Lifelong    Antifungal: Not needed thus far  Scheduled Discontinue Date: N/A    Antiviral: Valcyte    Scheduled Discontinue Date: Therapy Complete    Acid Reducer: Prilosec (omeprazole)  Scheduled Reviewed Date: takes prn    Thrombosis Prevention: not on  Scheduled Discontinue Date:      Blood Pressure Management  Frequency of home Blood Pressure checks: weekly  Most recent home BP: 142/68  Patient Blood pressure goal: <140/90  Patient blood pressure at goal:  no  Hospitalizations/ER visits since last assessment: 0      Med rec/DUR performed: yes  Med Rec Discrepancies: yes  On metformin from local clinic will inform coordinator    Medication adherence flowsheet 1/21/2021   Patient medication administration: Has assistance with medications   Patient estimated adherence level: %   Pharmacist assessment of adherence: Good   Patient reported doses missed per week: 0-1   Facilitators to medication adherence  Pill box;Caregiver assistance;Schedule/routine      Medication access flowsheet 1/21/2021   Number of pharmacies used: 2   Pharmacy: [facility];Other   Other pharmacy: local retail for metformin   Enrolled in [facility] pharmacy? Yes      Angelica reports feeling good.  [facility].   She does not  use a med card or a med list.  She uses a bill box, has a routine and her  helps manage her meds.    Oskar Gonzalez RP

## 2021-03-04 DIAGNOSIS — I10 ESSENTIAL HYPERTENSION: ICD-10-CM

## 2021-03-04 DIAGNOSIS — Z94.0 DECEASED-DONOR KIDNEY TRANSPLANT: ICD-10-CM

## 2021-03-04 DIAGNOSIS — Z48.298 AFTERCARE FOLLOWING ORGAN TRANSPLANT: ICD-10-CM

## 2021-03-04 DIAGNOSIS — Z94.0 KIDNEY REPLACED BY TRANSPLANT: Primary | ICD-10-CM

## 2021-03-04 RX ORDER — TACROLIMUS 1 MG/1
1 CAPSULE ORAL 2 TIMES DAILY
Qty: 60 CAPSULE | Refills: 0 | Status: SHIPPED | OUTPATIENT
Start: 2021-03-04 | End: 2021-03-26

## 2021-03-04 RX ORDER — MYCOPHENOLATE MOFETIL 250 MG/1
750 CAPSULE ORAL 2 TIMES DAILY
Qty: 180 CAPSULE | Refills: 0 | Status: SHIPPED | OUTPATIENT
Start: 2021-03-04 | End: 2021-03-26

## 2021-03-04 RX ORDER — SULFAMETHOXAZOLE AND TRIMETHOPRIM 400; 80 MG/1; MG/1
1 TABLET ORAL
Qty: 13 TABLET | Refills: 0 | Status: SHIPPED | OUTPATIENT
Start: 2021-03-05 | End: 2021-03-26

## 2021-03-04 NOTE — LETTER
Angelica Romero  [address] [place] [id number]                March 4, 2021    This letter is regarding your medication refills.    For the best outcome for your transplant and in order for us to refill your prescriptions, we encourage you to have a yearly clinic appointment with a physician and to have current labs. If you are unable to return to our transplant center there are several alternatives. Please call your coordinator to discuss them. .  To make an appointment with a physician here at [facility], please call:  The Transplant Office at [phone] or [phone].  .      The Transplant Staff at [facility]

## 2021-03-04 NOTE — TELEPHONE ENCOUNTER
ISSUE  Received a request to refill IS.  Due for transplant labs with a good drug level.  Due for nephrology appointment in April.    PLAN  Call Angelica:  Request she have a full set of transplant labs, including a good 12 hour drug level, drawn in the next 1-2 weeks.  Let her know scheduling will be reaching out to set up nephrology appointment. If she misses their call she should call back to schedule.    LPN Task:  Please call with above plan.

## 2021-03-09 DIAGNOSIS — Z79.899 ENCOUNTER FOR LONG-TERM CURRENT USE OF MEDICATION: ICD-10-CM

## 2021-03-09 DIAGNOSIS — Z48.298 AFTERCARE FOLLOWING ORGAN TRANSPLANT: ICD-10-CM

## 2021-03-09 DIAGNOSIS — Z94.0 KIDNEY REPLACED BY TRANSPLANT: ICD-10-CM

## 2021-03-26 DIAGNOSIS — Z48.298 AFTERCARE FOLLOWING ORGAN TRANSPLANT: ICD-10-CM

## 2021-03-26 DIAGNOSIS — Z94.0 DECEASED-DONOR KIDNEY TRANSPLANT: ICD-10-CM

## 2021-03-26 DIAGNOSIS — Z94.0 KIDNEY REPLACED BY TRANSPLANT: ICD-10-CM

## 2021-03-26 DIAGNOSIS — I10 ESSENTIAL HYPERTENSION: ICD-10-CM

## 2021-03-26 RX ORDER — TACROLIMUS 1 MG/1
CAPSULE ORAL
Qty: 60 CAPSULE | Refills: 0 | Status: SHIPPED | OUTPATIENT
Start: 2021-03-26 | End: 2021-05-11

## 2021-03-26 RX ORDER — SULFAMETHOXAZOLE AND TRIMETHOPRIM 400; 80 MG/1; MG/1
1 TABLET ORAL
Qty: 13 TABLET | Refills: 11 | Status: SHIPPED | OUTPATIENT
Start: 2021-03-26 | End: 2022-03-22

## 2021-03-26 RX ORDER — MYCOPHENOLATE MOFETIL 250 MG/1
750 CAPSULE ORAL 2 TIMES DAILY
Qty: 180 CAPSULE | Refills: 11 | Status: SHIPPED | OUTPATIENT
Start: 2021-03-26 | End: 2022-03-22

## 2021-03-30 ENCOUNTER — TELEPHONE (OUTPATIENT)
Dept: TRANSPLANT | Facility: CLINIC | Age: 56
End: 2021-03-30

## 2021-03-30 NOTE — TELEPHONE ENCOUNTER
ISSUE:  DSA sample hemolyzed    PLAN:  [email or web address] message sent to Angelica to let her know and to confirm which lab she uses.  Will send another lab order and contact lab to let them know to spin and send the serum to prevent this from happening again.       Sabrina Kaplan Rebecca, WALDEMAR   Cc: [facility] Immunology HLA Lab Class             Immunology received a sample for PRA testing drawn on 03/09/21. The sample arrived grossly hemolyzed and will need to be redrawn. The hemolysis is likely due to freezing in transit. If possible, have the drawing lab spin the sample and send us the serum. This will prevent the problem happening again.

## 2021-04-18 ENCOUNTER — HEALTH MAINTENANCE LETTER (OUTPATIENT)
Age: 56
End: 2021-04-18

## 2021-05-04 ENCOUNTER — TELEPHONE (OUTPATIENT)
Dept: TRANSPLANT | Facility: CLINIC | Age: 56
End: 2021-05-04

## 2021-05-04 NOTE — TELEPHONE ENCOUNTER
ISSUE:  Creatinine slightly elevated 1.2    PLAN:  Call and assess hydration status.  How much water is he drinking per day?  Any recent illness, diarrhea, s/s of a UTI, or medication changes?  Any increased intake of alcohol or caffeine?  Recommend increasing hydration and repeating labs within 1 week.    OUTCOME:  Called and spoke with Angelica.  She denies any illness, diarrhea, or medication changes.    She explains she likely was dehydrated.  She will increase her hydration and repeat labs in a couple of weeks.

## 2021-05-04 NOTE — LETTER
PHYSICIAN ORDERS      DATE & TIME ISSUED: May 4, 2021 3:57 PM  PATIENT NAME: Angelica Romero   : 1965     [facility] MR# [if applicable]: [id number]     DIAGNOSIS:  kidney transplant  ICD-10 CODE: Z94.0    Please obtain the following labs in 1-2 weeks   BMP    Any questions please call: [phone]  Please fax results to [phone].    .

## 2021-05-11 DIAGNOSIS — Z94.0 DECEASED-DONOR KIDNEY TRANSPLANT: ICD-10-CM

## 2021-05-11 DIAGNOSIS — Z94.0 KIDNEY REPLACED BY TRANSPLANT: Primary | ICD-10-CM

## 2021-05-11 DIAGNOSIS — I10 ESSENTIAL HYPERTENSION: ICD-10-CM

## 2021-05-11 DIAGNOSIS — Z48.298 AFTERCARE FOLLOWING ORGAN TRANSPLANT: ICD-10-CM

## 2021-05-11 DIAGNOSIS — Z94.0 KIDNEY REPLACED BY TRANSPLANT: ICD-10-CM

## 2021-05-11 RX ORDER — AMLODIPINE BESYLATE 10 MG/1
10 TABLET ORAL DAILY
Qty: 30 TABLET | Refills: 11 | Status: SHIPPED | OUTPATIENT
Start: 2021-05-11 | End: 2022-05-13

## 2021-05-11 RX ORDER — TACROLIMUS 1 MG/1
1 CAPSULE ORAL 2 TIMES DAILY
Qty: 60 CAPSULE | Refills: 3 | Status: SHIPPED | OUTPATIENT
Start: 2021-05-11 | End: 2021-09-14

## 2021-05-11 RX ORDER — METOPROLOL TARTRATE 50 MG
50 TABLET ORAL 2 TIMES DAILY
Qty: 60 TABLET | Refills: 11 | Status: SHIPPED | OUTPATIENT
Start: 2021-05-11 | End: 2022-05-13

## 2021-06-17 ENCOUNTER — VIRTUAL VISIT (OUTPATIENT)
Dept: NEPHROLOGY | Facility: CLINIC | Age: 56
End: 2021-06-17
Attending: INTERNAL MEDICINE
Payer: COMMERCIAL

## 2021-06-17 DIAGNOSIS — Z94.0 HTN, KIDNEY TRANSPLANT RELATED: ICD-10-CM

## 2021-06-17 DIAGNOSIS — E83.42 HYPOMAGNESEMIA: ICD-10-CM

## 2021-06-17 DIAGNOSIS — D84.9 IMMUNOSUPPRESSION (H): ICD-10-CM

## 2021-06-17 DIAGNOSIS — N18.31 STAGE 3A CHRONIC KIDNEY DISEASE (H): ICD-10-CM

## 2021-06-17 DIAGNOSIS — Z94.0 KIDNEY REPLACED BY TRANSPLANT: Primary | ICD-10-CM

## 2021-06-17 DIAGNOSIS — Z48.298 AFTERCARE FOLLOWING ORGAN TRANSPLANT: ICD-10-CM

## 2021-06-17 DIAGNOSIS — E55.9 VITAMIN D DEFICIENCY: ICD-10-CM

## 2021-06-17 DIAGNOSIS — I15.1 HTN, KIDNEY TRANSPLANT RELATED: ICD-10-CM

## 2021-06-17 PROCEDURE — 99443 PR PHYSICIAN TELEPHONE EVALUATION 21-30 MIN: CPT | Mod: 95 | Performed by: INTERNAL MEDICINE

## 2021-06-17 RX ORDER — BLOOD-GLUCOSE METER
KIT MISCELLANEOUS
COMMUNITY
Start: 2021-06-16

## 2021-06-17 RX ORDER — LANCETS 28 GAUGE
EACH MISCELLANEOUS
COMMUNITY
Start: 2021-06-16

## 2021-06-17 RX ORDER — BLOOD-GLUCOSE METER
KIT MISCELLANEOUS SEE ADMIN INSTRUCTIONS
COMMUNITY
Start: 2021-03-10

## 2021-06-17 NOTE — LETTER
6/17/2021      RE: Angelica Romero  [address]  [facility] [address]       Angelica is a 55 year old who is being evaluated via a billable telephone visit.      What phone number would you like to be contacted at? [phone]  How would you like to obtain your AVS? Mail a copy  Phone call duration: 22 minutes      CHRONIC TRANSPLANT NEPHROLOGY VISIT    Assessment & Plan   # DDKT: Stable   - Baseline Creatinine:  ~ 1.0-1.2   - Proteinuria: Minimal (0.2-0.5 grams)   - Date DSA Last Checked: Apr/2020      Latest DSA: No   - BK Viremia: Not checked recently due to time from transplant   - Kidney Tx Biopsy: No    # Immunosuppression: Tacrolimus immediate release (goal 4-6) and Mycophenolate mofetil (dose 750 mg every 12 hours)   - Continue with intensive monitoring of immunosuppression for efficacy and toxicity.   - Changes: No    # Infection Prophylaxis:   - PJP: Sulfa/TMP (Bactrim)    # Hypertension: Controlled;  Goal BP: < 130/80   - Changes: No    # Diabetes: Borderline control (HbA1c 7-9%) - recent diagnosis Last HbA1c: Unknown  Patient was recently started on metformin.   - Management as per primary care.    # Mineral Bone Disorder:   - Secondary renal hyperparathyroidism; PTH level: Not checked recently        On treatment: None  - Vitamin D; level: Not checked recently        On supplement: Yes  - Calcium; level: Normal        On supplement: No    # Electrolytes:   - Potassium; level: Normal        On supplement: No  - Magnesium; level: Not checked recently        On supplement: Yes  - Bicarbonate; level: Normal        On supplement: No    # Obesity, Class II (BMI = 38.3): Weight has been a bit up and down.   - Recommend weight loss for overall health by increasing exercise and watching caloric intake.    # GERD: Mostly controlled on omeprazole.    # Skin Cancer: New lesions: none   - Discussed sun protection and recommend regular follow up with Dermatology.    # Medical Compliance: Yes     # COVID-19 Virus Review:  Discussed COVID-19 virus and the potential medical risks.  Reviewed preventative health recommendations, which includes washing hands for 20 seconds, avoid touching your face, and social distancing.  Asked patient to inform the transplant center if they are exposed or diagnosed with this virus.    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    Return visit: Return in about 8 months (around 2/17/2022).    Bill Tejeda MD    Chief Complaint   Ms. Romero is a 55 year old here for kidney transplant and immunosuppression management.    History of Present Illness    Ms. Romero reports feeling good overall with some medical complaints.  She was diagnosed with diabetes last December and started on metformin.  Patient was then admitted to the hospital in January for a Shingles infection.  Since that time she has been doing well.  Her energy level is good and remains normal.  She is active, although only gets a little exercise.  Denies any chest pain or shortness of breath with exertion.  Some leg swelling, for which she wears compression stockings.    Appetite is good and her weight has been a bit up and down, but now at ~ 217 lbs.  No nausea, vomiting or diarrhea.  She will get occasional heartburn symptoms and takes omeprazole for this.  No fever, sweats or chills.    Home BP: 120/70s    Problem List   Patient Active Problem List   Diagnosis     HTN, kidney transplant related     Obesity, unspecified     Kidney replaced by transplant     Immunosuppression (H)     Aftercare following organ transplant     Secondary renal hyperparathyroidism (H)      Vitamin D deficiency     Gastroesophageal reflux disease without esophagitis     Latent tuberculosis     Abnormal LFTs     Chronic kidney disease, stage 3     Hypomagnesemia       Allergies   No Known Allergies    Medications   Current Outpatient Medications   Medication Sig     amLODIPine (NORVASC) 10 MG tablet Take 1 tablet (10 mg) by mouth daily     blood glucose monitoring (FREESTYLE LITE) meter device kit See Admin Instructions     blood glucose monitoring (FREESTYLE) lancets      cholecalciferol (VITAMIN D) 1000 UNIT tablet Take 2 tablets (2,000 Units) by mouth daily     FREESTYLE LITE test strip      losartan (COZAAR) 50 MG tablet TAKE 1 TABLET BY MOUTH AT BEDTIME     magnesium oxide (MAG-OX) 400 MG tablet Take 2 tablets (800 mg) by mouth 2 times daily     metFORMIN (GLUCOPHAGE) 500 MG tablet TAKE 1 TABLET BY MOUTH TWICE DAILY WITH THE LARGEST MEAL(S) OF THE DAY     metoprolol tartrate (LOPRESSOR) 50 MG tablet Take 1 tablet (50 mg) by mouth 2 times daily     mycophenolate (GENERIC EQUIVALENT) 250 MG capsule Take 3 capsules (750 mg) by mouth 2 times daily     omeprazole (PRILOSEC) 20 MG CR capsule TAKE ONE CAPSULE BY MOUTH ONCE DAILY     sulfamethoxazole-trimethoprim (BACTRIM) 400-80 MG tablet Take 1 tablet by mouth Every Mon, Wed, Fri Morning     tacrolimus (GENERIC EQUIVALENT) 0.5 MG capsule HOLD     tacrolimus (GENERIC EQUIVALENT) 1 MG capsule Take 1 capsule (1 mg) by mouth 2 times daily     tacrolimus (GENERIC EQUIVALENT) 1 MG capsule Take 1 capsule (1 mg) by mouth 2 times daily (Patient not taking: Reported on 6/17/2021)     No current facility-administered medications for this visit.      Medications Discontinued During This Encounter   Medication Reason     omeprazole (PRILOSEC) 20 MG DR capsule      acetic acid (VOSOL) 2 % otic solution        Physical Exam   Vital signs and physical exam were deferred for this telemedicine visit.    Data     Renal Latest Ref Rng & Units 6/8/2021 5/3/2021 3/9/2021   Na  133 - 144 mmol/L - - -   Na (external) 133 - 144 mmol/L 139 141 141   K 3.4 - 5.3 mmol/L - - -   K (external) 3.4 - 5.1 mmol/L 4.0 4.2 3.4   Cl 94 - 109 mmol/L - - -   Cl (external) 96 - 109 mmol/L 104 104 103   CO2 20 - 32 mmol/L - - -   CO2 (external) 21 - 33 mmol/L 25 26 25   BUN 7 - 30 mg/dL - - -   BUN (external) 6 - 24 mg/dL 11 16 13   Cr 0.52 - 1.04 mg/dL - - -   Cr (external) 0.4 - 1.0 mg/dL 1.0 1.2(H) 1.0   Glucose 70 - 99 mg/dL - - -   Glucose (external) 70 - 99 mg/dL 133(H) 170(H) 148(H)   Ca  8.5 - 10.1 mg/dL - - -   Ca (external) 8.2 - 10.0 mg/dL 9.4 10.0 10.0   Mg 1.6 - 2.3 mg/dL - - -   Mg (external) 1.7 - 2.4 mg/dL - - -     Bone Health Latest Ref Rng & Units 12/18/2017 9/19/2017 9/12/2017   Phos 2.5 - 4.5 mg/dL 2.1(L) - 1.9(L)   Phos (external) 2.6 - 4.7 mg/dL - 1.8(L) -   PTHi 12 - 72 pg/mL 246(H) - 210(H)   Vit D Def 20 - 75 ug/L 28 - -     Heme Latest Ref Rng & Units 6/8/2021 5/3/2021 3/9/2021   WBC 4.0 - 11.0 10e9/L - - -   WBC (external) 4.1 - 10.9 x10:3/uL 5.3 6.2 4.7   Hgb 11.7 - 15.7 g/dL - - -   Hgb (external) 11.7 - 15.5 g/dL 14.8 15.1 14.1   Plt 150 - 450 10e9/L - - -   Plt (external) 150 - 450 x10:3/uL 204 246 220   ABSOLUTE NEUTROPHIL 1.6 - 8.3 10e9/L - - -   ABSOLUTE NEUTROPHILS (EXTERNAL) 1.9 - 8.1 x10:3/uL 3.0 - -   ABSOLUTE LYMPHOCYTES 0.8 - 5.3 10e9/L - - -   ABSOLUTE LYMPHOCYTES (EXTERNAL) 1.0 - 3.9 x10:3/uL 1.7 - -   ABSOLUTE MONOCYTES 0.0 - 1.3 10e9/L - - -   ABSOLUTE MONOCYTES (EXTERNAL) 0.1 - 0.9 x10:3/uL 0.4 - -   ABSOLUTE EOSINOPHILS 0.0 - 0.7 10e9/L - - -   ABSOLUTE EOSINOPHILS (EXTERNAL) 0.0 - 0.5 x10:3/uL 0.2 - -   ABSOLUTE BASOPHILS 0.0 - 0.2 10e9/L - - -   ABSOLUTE BASOPHILS (EXTERNAL) 0.0 - 0.2 x10:3/uL 0.0 - -   ABS IMMATURE GRANULOCYTES 0 - 0.4 10e9/L - - -   ABSOLUTE NUCLEATED RBC - - - -     Liver Latest Ref Rng & Units 5/7/2019 5/22/2018 4/24/2018   AP 40 - 150 U/L - - -   AP (external) 45 - 115 IU/L 93 92 90   TBili 0.2 - 1.3 mg/dL - - -   TBili (external) 0.4 -  1.4 mg/dL 0.8 0.8 0.9   DBili 0.0 - 0.2 mg/dL - - -   DBili (external) 0.0 - 0.3 mg/dL 0.2 0.2 0.3   ALT 0 - 50 U/L - - -   ALT (external) 7 - 55 U/L 133(H) 178(H) 248(H)   AST 0 - 45 U/L - - -   AST (external) 14 - 41 U/L 97(H) 166(H) 306(H)   Tot Protein 6.8 - 8.8 g/dL - - -   Tot Protein (external) 6.2 - 8.2 g/dL 7.4 6.9 7.1   Albumin 3.4 - 5.0 g/dL - - -   Albumin (external) 3.3 - 5.0 g/dL 3.6 3.4 3.5     Pancreas Latest Ref Rng & Units 2/6/2018 2/8/2017 9/10/2016   A1C 4.0 - 6.0 % 5.3 4.0(L) 4.7     Iron studies Latest Ref Rng & Units 2/6/2018 3/30/2017   Iron ug/dL 96 38   Iron sat 15 - 46 % - 17   Ferritin 26 - 252 ng/mL 3,499(A) 823(H)     [facility] Txp Virology Latest Ref Rng & Units 5/7/2019 3/7/2019 5/22/2018   CVM DNA Quant - - - -   CMV DNA Quant Ext Undetected IU/mL Undetected - <137(A)   CMV QUANT IU/ML CMVND:CMV DNA Not Detected [IU]/mL - - -   LOG IU/ML OF CMVQNT <2.1 [Log:IU]/mL - - -   LOG IU/ML OF CMVQNT (EXTERNAL) log IU/mL - - <2.14   BK Spec - - - -   BK Res BKNEG:BK Virus DNA Not Detected copies/mL - - -   BK Log <2.7 Log copies/mL - - -   BK Quant Result Ext None detected - None detected -   BK Quant Spec Ext - - Plasma -   EBV VCA IGG ANTIBODY U/mL - - -   EBV CAPSID ANTIBODY IGG 0.0 - 0.8 AI - - -   EBV DNA QUANT (EXTERNAL) Undetected IU/mL - - -   EBV DNA COPIES/ML EBVNEG:EBV DNA Not Detected [Copies]/mL - - -   EBV DNA LOG OF COPIES <2.7 [Log:copies]/mL - - -   EBV DNA LOG OF COPIES (EXTERNAL) log IU/mL - - -   Hep B Core NEG - - -   Hep B Core Ext - - - -   Hep B Surf - - - -   Hep B Surf Ext  - - - -   HIV 1&2 NEG - - -        Recent Labs   Lab Test 02/20/18  1035 04/24/18  1030 05/22/18  1030   DOSTAC 02/19/18 2030 2030 4/23/18 05/21 2100   TACROL 10.0 5.4 8.8     Recent Labs   Lab Test 02/13/17  0718   DOSMPA Not Provided   MPACID 0.57*   MPAG 216.0*       Bill Tejeda MD

## 2021-06-17 NOTE — LETTER
6/17/2021       RE: Angelica Romero  [address]  [facility] [address]     Dear Colleague,    Thank you for referring your patient, Angelica Romero, to the [facility] [place] at [facility]. Please see a copy of my visit note below.    Angelica is a 55 year old who is being evaluated via a billable telephone visit.      What phone number would you like to be contacted at? [phone]  How would you like to obtain your AVS? Mail a copy  Phone call duration: 22 minutes      CHRONIC TRANSPLANT NEPHROLOGY VISIT    Assessment & Plan   # DDKT: Stable   - Baseline Creatinine:  ~ 1.0-1.2   - Proteinuria: Minimal (0.2-0.5 grams)   - Date DSA Last Checked: Apr/2020      Latest DSA: No   - BK Viremia: Not checked recently due to time from transplant   - Kidney Tx Biopsy: No    # Immunosuppression: Tacrolimus immediate release (goal 4-6) and Mycophenolate mofetil (dose 750 mg every 12 hours)   - Continue with intensive monitoring of immunosuppression for efficacy and toxicity.   - Changes: No    # Infection Prophylaxis:   - PJP: Sulfa/TMP (Bactrim)    # Hypertension: Controlled;  Goal BP: < 130/80   - Changes: No    # Diabetes: Borderline control (HbA1c 7-9%) - recent diagnosis Last HbA1c: Unknown  Patient was recently started on metformin.   - Management as per primary care.    # Mineral Bone Disorder:   - Secondary renal hyperparathyroidism; PTH level: Not checked recently        On treatment: None  - Vitamin D; level: Not checked recently        On supplement: Yes  - Calcium; level: Normal        On supplement: No    # Electrolytes:   - Potassium; level: Normal        On supplement: No  - Magnesium; level: Not checked recently        On supplement: Yes  - Bicarbonate; level: Normal        On supplement: No    # Obesity, Class II (BMI = 38.3): Weight has been a bit up and down.   - Recommend weight loss for overall health by increasing exercise and watching  caloric intake.    # GERD: Mostly controlled on omeprazole.    # Skin Cancer: New lesions: none   - Discussed sun protection and recommend regular follow up with Dermatology.    # Medical Compliance: Yes     # COVID-19 Virus Review: Discussed COVID-19 virus and the potential medical risks.  Reviewed preventative health recommendations, which includes washing hands for 20 seconds, avoid touching your face, and social distancing.  Asked patient to inform the transplant center if they are exposed or diagnosed with this virus.    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    Return visit: Return in about 8 months (around 2/17/2022).    Bill Tejeda MD    Chief Complaint   Ms. Romero is a 55 year old here for kidney transplant and immunosuppression management.    History of Present Illness    Ms. Romero reports feeling good overall with some medical complaints.  She was diagnosed with diabetes last December and started on metformin.  Patient was then admitted to the hospital in January for a Shingles infection.  Since that time she has been doing well.  Her energy level is good and remains normal.  She is active, although only gets a little exercise.  Denies any chest pain or shortness of breath with exertion.  Some leg swelling, for which she wears compression stockings.    Appetite is good and her weight has been a bit up and down, but now at ~ 217 lbs.  No nausea, vomiting or diarrhea.  She will get occasional heartburn symptoms and takes omeprazole for this.  No fever, sweats or chills.    Home BP: 120/70s    Problem List   Patient Active Problem  List   Diagnosis     HTN, kidney transplant related     Obesity, unspecified     Kidney replaced by transplant     Immunosuppression (H)     Aftercare following organ transplant     Secondary renal hyperparathyroidism (H)     Vitamin D deficiency     Gastroesophageal reflux disease without esophagitis     Latent tuberculosis     Abnormal LFTs     Chronic kidney disease, stage 3     Hypomagnesemia       Allergies   No Known Allergies    Medications   Current Outpatient Medications   Medication Sig     amLODIPine (NORVASC) 10 MG tablet Take 1 tablet (10 mg) by mouth daily     blood glucose monitoring (FREESTYLE LITE) meter device kit See Admin Instructions     blood glucose monitoring (FREESTYLE) lancets      cholecalciferol (VITAMIN D) 1000 UNIT tablet Take 2 tablets (2,000 Units) by mouth daily     FREESTYLE LITE test strip      losartan (COZAAR) 50 MG tablet TAKE 1 TABLET BY MOUTH AT BEDTIME     magnesium oxide (MAG-OX) 400 MG tablet Take 2 tablets (800 mg) by mouth 2 times daily     metFORMIN (GLUCOPHAGE) 500 MG tablet TAKE 1 TABLET BY MOUTH TWICE DAILY WITH THE LARGEST MEAL(S) OF THE DAY     metoprolol tartrate (LOPRESSOR) 50 MG tablet Take 1 tablet (50 mg) by mouth 2 times daily     mycophenolate (GENERIC EQUIVALENT) 250 MG capsule Take 3 capsules (750 mg) by mouth 2 times daily     omeprazole (PRILOSEC) 20 MG CR capsule TAKE ONE CAPSULE BY MOUTH ONCE DAILY     sulfamethoxazole-trimethoprim (BACTRIM) 400-80 MG tablet Take 1 tablet by mouth Every Mon, Wed, Fri Morning     tacrolimus (GENERIC EQUIVALENT) 0.5 MG capsule HOLD     tacrolimus (GENERIC EQUIVALENT) 1 MG capsule Take 1 capsule (1 mg) by mouth 2 times daily     tacrolimus (GENERIC EQUIVALENT) 1 MG capsule Take 1 capsule (1 mg) by mouth 2 times daily (Patient not taking: Reported on 6/17/2021)     No current facility-administered medications for this visit.      Medications Discontinued During This Encounter   Medication Reason     omeprazole (PRILOSEC) 20  MG DR capsule      acetic acid (VOSOL) 2 % otic solution        Physical Exam   Vital signs and physical exam were deferred for this telemedicine visit.    Data     Renal Latest Ref Rng & Units 6/8/2021 5/3/2021 3/9/2021   Na 133 - 144 mmol/L - - -   Na (external) 133 - 144 mmol/L 139 141 141   K 3.4 - 5.3 mmol/L - - -   K (external) 3.4 - 5.1 mmol/L 4.0 4.2 3.4   Cl 94 - 109 mmol/L - - -   Cl (external) 96 - 109 mmol/L 104 104 103   CO2 20 - 32 mmol/L - - -   CO2 (external) 21 - 33 mmol/L 25 26 25   BUN 7 - 30 mg/dL - - -   BUN (external) 6 - 24 mg/dL 11 16 13   Cr 0.52 - 1.04 mg/dL - - -   Cr (external) 0.4 - 1.0 mg/dL 1.0 1.2(H) 1.0   Glucose 70 - 99 mg/dL - - -   Glucose (external) 70 - 99 mg/dL 133(H) 170(H) 148(H)   Ca  8.5 - 10.1 mg/dL - - -   Ca (external) 8.2 - 10.0 mg/dL 9.4 10.0 10.0   Mg 1.6 - 2.3 mg/dL - - -   Mg (external) 1.7 - 2.4 mg/dL - - -     Bone Health Latest Ref Rng & Units 12/18/2017 9/19/2017 9/12/2017   Phos 2.5 - 4.5 mg/dL 2.1(L) - 1.9(L)   Phos (external) 2.6 - 4.7 mg/dL - 1.8(L) -   PTHi 12 - 72 pg/mL 246(H) - 210(H)   Vit D Def 20 - 75 ug/L 28 - -     Heme Latest Ref Rng & Units 6/8/2021 5/3/2021 3/9/2021   WBC 4.0 - 11.0 10e9/L - - -   WBC (external) 4.1 - 10.9 x10:3/uL 5.3 6.2 4.7   Hgb 11.7 - 15.7 g/dL - - -   Hgb (external) 11.7 - 15.5 g/dL 14.8 15.1 14.1   Plt 150 - 450 10e9/L - - -   Plt (external) 150 - 450 x10:3/uL 204 246 220   ABSOLUTE NEUTROPHIL 1.6 - 8.3 10e9/L - - -   ABSOLUTE NEUTROPHILS (EXTERNAL) 1.9 - 8.1 x10:3/uL 3.0 - -   ABSOLUTE LYMPHOCYTES 0.8 - 5.3 10e9/L - - -   ABSOLUTE LYMPHOCYTES (EXTERNAL) 1.0 - 3.9 x10:3/uL 1.7 - -   ABSOLUTE MONOCYTES 0.0 - 1.3 10e9/L - - -   ABSOLUTE MONOCYTES (EXTERNAL) 0.1 - 0.9 x10:3/uL 0.4 - -   ABSOLUTE EOSINOPHILS 0.0 - 0.7 10e9/L - - -   ABSOLUTE EOSINOPHILS (EXTERNAL) 0.0 - 0.5 x10:3/uL 0.2 - -   ABSOLUTE BASOPHILS 0.0 - 0.2 10e9/L - - -   ABSOLUTE BASOPHILS (EXTERNAL) 0.0 - 0.2 x10:3/uL 0.0 - -   ABS IMMATURE GRANULOCYTES 0 - 0.4  10e9/L - - -   ABSOLUTE NUCLEATED RBC - - - -     Liver Latest Ref Rng & Units 5/7/2019 5/22/2018 4/24/2018   AP 40 - 150 U/L - - -   AP (external) 45 - 115 IU/L 93 92 90   TBili 0.2 - 1.3 mg/dL - - -   TBili (external) 0.4 - 1.4 mg/dL 0.8 0.8 0.9   DBili 0.0 - 0.2 mg/dL - - -   DBili (external) 0.0 - 0.3 mg/dL 0.2 0.2 0.3   ALT 0 - 50 U/L - - -   ALT (external) 7 - 55 U/L 133(H) 178(H) 248(H)   AST 0 - 45 U/L - - -   AST (external) 14 - 41 U/L 97(H) 166(H) 306(H)   Tot Protein 6.8 - 8.8 g/dL - - -   Tot Protein (external) 6.2 - 8.2 g/dL 7.4 6.9 7.1   Albumin 3.4 - 5.0 g/dL - - -   Albumin (external) 3.3 - 5.0 g/dL 3.6 3.4 3.5     Pancreas Latest Ref Rng & Units 2/6/2018 2/8/2017 9/10/2016   A1C 4.0 - 6.0 % 5.3 4.0(L) 4.7     Iron studies Latest Ref Rng & Units 2/6/2018 3/30/2017   Iron ug/dL 96 38   Iron sat 15 - 46 % - 17   Ferritin 26 - 252 ng/mL 3,499(A) 823(H)     [facility] Txp Virology Latest Ref Rng & Units 5/7/2019 3/7/2019 5/22/2018   CVM DNA Quant - - - -   CMV DNA Quant Ext Undetected IU/mL Undetected - <137(A)   CMV QUANT IU/ML CMVND:CMV DNA Not Detected [IU]/mL - - -   LOG IU/ML OF CMVQNT <2.1 [Log:IU]/mL - - -   LOG IU/ML OF CMVQNT (EXTERNAL) log IU/mL - - <2.14   BK Spec - - - -   BK Res BKNEG:BK Virus DNA Not Detected copies/mL - - -   BK Log <2.7 Log copies/mL - - -   BK Quant Result Ext None detected - None detected -   BK Quant Spec Ext - - Plasma -   EBV VCA IGG ANTIBODY U/mL - - -   EBV CAPSID ANTIBODY IGG 0.0 - 0.8 AI - - -   EBV DNA QUANT (EXTERNAL) Undetected IU/mL - - -   EBV DNA COPIES/ML EBVNEG:EBV DNA Not Detected [Copies]/mL - - -   EBV DNA LOG OF COPIES <2.7 [Log:copies]/mL - - -   EBV DNA LOG OF COPIES (EXTERNAL) log IU/mL - - -   Hep B Core NEG - - -   Hep B Core Ext - - - -   Hep B Surf - - - -   Hep B Surf Ext  - - - -   HIV 1&2 NEG - - -        Recent Labs   Lab Test 02/20/18  1035 04/24/18  1030 05/22/18  1030   DOSTAC 02/19/18 2030 2030 4/23/18 05/21 2100   TACROL 10.0 5.4 8.8      Recent Labs   Lab Test 02/13/17  0718   DOSMPA Not Provided   MPACID [id number]*   MPAG 216.0*       Again, thank you for allowing me to participate in the care of your patient.      Sincerely,    Bill Tejeda MD

## 2021-06-17 NOTE — PROGRESS NOTES
Angelica is a 55 year old who is being evaluated via a billable telephone visit.      What phone number would you like to be contacted at? [phone]  How would you like to obtain your AVS? Mail a copy  Phone call duration: 22 minutes      CHRONIC TRANSPLANT NEPHROLOGY VISIT    Assessment & Plan   # DDKT: Stable   - Baseline Creatinine:  ~ 1.0-1.2   - Proteinuria: Minimal (0.2-0.5 grams)   - Date DSA Last Checked: Apr/2020      Latest DSA: No   - BK Viremia: Not checked recently due to time from transplant   - Kidney Tx Biopsy: No    # Immunosuppression: Tacrolimus immediate release (goal 4-6) and Mycophenolate mofetil (dose 750 mg every 12 hours)   - Continue with intensive monitoring of immunosuppression for efficacy and toxicity.   - Changes: No    # Infection Prophylaxis:   - PJP: Sulfa/TMP (Bactrim)    # Hypertension: Controlled;  Goal BP: < 130/80   - Changes: No    # Diabetes: Borderline control (HbA1c 7-9%) - recent diagnosis Last HbA1c: Unknown  Patient was recently started on metformin.   - Management as per primary care.    # Mineral Bone Disorder:   - Secondary renal hyperparathyroidism; PTH level: Not checked recently        On treatment: None  - Vitamin D; level: Not checked recently        On supplement: Yes  - Calcium; level: Normal        On supplement: No    # Electrolytes:   - Potassium; level: Normal        On supplement: No  - Magnesium; level: Not checked recently        On supplement: Yes  - Bicarbonate; level: Normal        On supplement: No    # Obesity, Class II (BMI = 38.3): Weight has been a bit up and down.   - Recommend weight loss for overall health by increasing exercise and watching caloric intake.    # GERD: Mostly controlled on omeprazole.    # Skin Cancer: New lesions: none   - Discussed sun protection and recommend regular follow up with Dermatology.    # Medical Compliance: Yes     # COVID-19 Virus Review: Discussed COVID-19 virus and the potential medical risks.  Reviewed  preventative health recommendations, which includes washing hands for 20 seconds, avoid touching your face, and social distancing.  Asked patient to inform the transplant center if they are exposed or diagnosed with this virus.    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    Return visit: Return in about 8 months (around 2/17/2022).    Bill Tejeda MD    Chief Complaint   Ms. Romero is a 55 year old here for kidney transplant and immunosuppression management.    History of Present Illness    Ms. Romero reports feeling good overall with some medical complaints.  She was diagnosed with diabetes last December and started on metformin.  Patient was then admitted to the hospital in January for a Shingles infection.  Since that time she has been doing well.  Her energy level is good and remains normal.  She is active, although only gets a little exercise.  Denies any chest pain or shortness of breath with exertion.  Some leg swelling, for which she wears compression stockings.    Appetite is good and her weight has been a bit up and down, but now at ~ 217 lbs.  No nausea, vomiting or diarrhea.  She will get occasional heartburn symptoms and takes omeprazole for this.  No fever, sweats or chills.    Home BP: 120/70s    Problem List   Patient Active Problem List   Diagnosis     HTN, kidney transplant related     Obesity, unspecified     Kidney replaced by transplant     Immunosuppression (H)     Aftercare following organ transplant     Secondary renal hyperparathyroidism (H)     Vitamin D deficiency     Gastroesophageal reflux disease without  esophagitis     Latent tuberculosis     Abnormal LFTs     Chronic kidney disease, stage 3     Hypomagnesemia       Allergies   No Known Allergies    Medications   Current Outpatient Medications   Medication Sig     amLODIPine (NORVASC) 10 MG tablet Take 1 tablet (10 mg) by mouth daily     blood glucose monitoring (FREESTYLE LITE) meter device kit See Admin Instructions     blood glucose monitoring (FREESTYLE) lancets      cholecalciferol (VITAMIN D) 1000 UNIT tablet Take 2 tablets (2,000 Units) by mouth daily     FREESTYLE LITE test strip      losartan (COZAAR) 50 MG tablet TAKE 1 TABLET BY MOUTH AT BEDTIME     magnesium oxide (MAG-OX) 400 MG tablet Take 2 tablets (800 mg) by mouth 2 times daily     metFORMIN (GLUCOPHAGE) 500 MG tablet TAKE 1 TABLET BY MOUTH TWICE DAILY WITH THE LARGEST MEAL(S) OF THE DAY     metoprolol tartrate (LOPRESSOR) 50 MG tablet Take 1 tablet (50 mg) by mouth 2 times daily     mycophenolate (GENERIC EQUIVALENT) 250 MG capsule Take 3 capsules (750 mg) by mouth 2 times daily     omeprazole (PRILOSEC) 20 MG CR capsule TAKE ONE CAPSULE BY MOUTH ONCE DAILY     sulfamethoxazole-trimethoprim (BACTRIM) 400-80 MG tablet Take 1 tablet by mouth Every Mon, Wed, Fri Morning     tacrolimus (GENERIC EQUIVALENT) 0.5 MG capsule HOLD     tacrolimus (GENERIC EQUIVALENT) 1 MG capsule Take 1 capsule (1 mg) by mouth 2 times daily     tacrolimus (GENERIC EQUIVALENT) 1 MG capsule Take 1 capsule (1 mg) by mouth 2 times daily (Patient not taking: Reported on 6/17/2021)     No current facility-administered medications for this visit.      Medications Discontinued During This Encounter   Medication Reason     omeprazole (PRILOSEC) 20 MG DR capsule      acetic acid (VOSOL) 2 % otic solution        Physical Exam   Vital signs and physical exam were deferred for this telemedicine visit.    Data     Renal Latest Ref Rng & Units 6/8/2021 5/3/2021 3/9/2021   Na 133 - 144 mmol/L - - -   Na (external) 133 - 144 mmol/L 139 141  141   K 3.4 - 5.3 mmol/L - - -   K (external) 3.4 - 5.1 mmol/L 4.0 4.2 3.4   Cl 94 - 109 mmol/L - - -   Cl (external) 96 - 109 mmol/L 104 104 103   CO2 20 - 32 mmol/L - - -   CO2 (external) 21 - 33 mmol/L 25 26 25   BUN 7 - 30 mg/dL - - -   BUN (external) 6 - 24 mg/dL 11 16 13   Cr 0.52 - 1.04 mg/dL - - -   Cr (external) 0.4 - 1.0 mg/dL 1.0 1.2(H) 1.0   Glucose 70 - 99 mg/dL - - -   Glucose (external) 70 - 99 mg/dL 133(H) 170(H) 148(H)   Ca  8.5 - 10.1 mg/dL - - -   Ca (external) 8.2 - 10.0 mg/dL 9.4 10.0 10.0   Mg 1.6 - 2.3 mg/dL - - -   Mg (external) 1.7 - 2.4 mg/dL - - -     Bone Health Latest Ref Rng & Units 12/18/2017 9/19/2017 9/12/2017   Phos 2.5 - 4.5 mg/dL 2.1(L) - 1.9(L)   Phos (external) 2.6 - 4.7 mg/dL - 1.8(L) -   PTHi 12 - 72 pg/mL 246(H) - 210(H)   Vit D Def 20 - 75 ug/L 28 - -     Heme Latest Ref Rng & Units 6/8/2021 5/3/2021 3/9/2021   WBC 4.0 - 11.0 10e9/L - - -   WBC (external) 4.1 - 10.9 x10:3/uL 5.3 6.2 4.7   Hgb 11.7 - 15.7 g/dL - - -   Hgb (external) 11.7 - 15.5 g/dL 14.8 15.1 14.1   Plt 150 - 450 10e9/L - - -   Plt (external) 150 - 450 x10:3/uL 204 246 220   ABSOLUTE NEUTROPHIL 1.6 - 8.3 10e9/L - - -   ABSOLUTE NEUTROPHILS (EXTERNAL) 1.9 - 8.1 x10:3/uL 3.0 - -   ABSOLUTE LYMPHOCYTES 0.8 - 5.3 10e9/L - - -   ABSOLUTE LYMPHOCYTES (EXTERNAL) 1.0 - 3.9 x10:3/uL 1.7 - -   ABSOLUTE MONOCYTES 0.0 - 1.3 10e9/L - - -   ABSOLUTE MONOCYTES (EXTERNAL) 0.1 - 0.9 x10:3/uL 0.4 - -   ABSOLUTE EOSINOPHILS 0.0 - 0.7 10e9/L - - -   ABSOLUTE EOSINOPHILS (EXTERNAL) 0.0 - 0.5 x10:3/uL 0.2 - -   ABSOLUTE BASOPHILS 0.0 - 0.2 10e9/L - - -   ABSOLUTE BASOPHILS (EXTERNAL) 0.0 - 0.2 x10:3/uL 0.0 - -   ABS IMMATURE GRANULOCYTES 0 - 0.4 10e9/L - - -   ABSOLUTE NUCLEATED RBC - - - -     Liver Latest Ref Rng & Units 5/7/2019 5/22/2018 4/24/2018   AP 40 - 150 U/L - - -   AP (external) 45 - 115 IU/L 93 92 90   TBili 0.2 - 1.3 mg/dL - - -   TBili (external) 0.4 - 1.4 mg/dL 0.8 0.8 0.9   DBili 0.0 - 0.2 mg/dL - - -   DBili  (external) 0.0 - 0.3 mg/dL 0.2 0.2 0.3   ALT 0 - 50 U/L - - -   ALT (external) 7 - 55 U/L 133(H) 178(H) 248(H)   AST 0 - 45 U/L - - -   AST (external) 14 - 41 U/L 97(H) 166(H) 306(H)   Tot Protein 6.8 - 8.8 g/dL - - -   Tot Protein (external) 6.2 - 8.2 g/dL 7.4 6.9 7.1   Albumin 3.4 - 5.0 g/dL - - -   Albumin (external) 3.3 - 5.0 g/dL 3.6 3.4 3.5     Pancreas Latest Ref Rng & Units 2/6/2018 2/8/2017 9/10/2016   A1C 4.0 - 6.0 % 5.3 4.0(L) 4.7     Iron studies Latest Ref Rng & Units 2/6/2018 3/30/2017   Iron ug/dL 96 38   Iron sat 15 - 46 % - 17   Ferritin 26 - 252 ng/mL 3,499(A) 823(H)     [facility] Txp Virology Latest Ref Rng & Units 5/7/2019 3/7/2019 5/22/2018   CVM DNA Quant - - - -   CMV DNA Quant Ext Undetected IU/mL Undetected - <137(A)   CMV QUANT IU/ML CMVND:CMV DNA Not Detected [IU]/mL - - -   LOG IU/ML OF CMVQNT <2.1 [Log:IU]/mL - - -   LOG IU/ML OF CMVQNT (EXTERNAL) log IU/mL - - <2.14   BK Spec - - - -   BK Res BKNEG:BK Virus DNA Not Detected copies/mL - - -   BK Log <2.7 Log copies/mL - - -   BK Quant Result Ext None detected - None detected -   BK Quant Spec Ext - - Plasma -   EBV VCA IGG ANTIBODY U/mL - - -   EBV CAPSID ANTIBODY IGG 0.0 - 0.8 AI - - -   EBV DNA QUANT (EXTERNAL) Undetected IU/mL - - -   EBV DNA COPIES/ML EBVNEG:EBV DNA Not Detected [Copies]/mL - - -   EBV DNA LOG OF COPIES <2.7 [Log:copies]/mL - - -   EBV DNA LOG OF COPIES (EXTERNAL) log IU/mL - - -   Hep B Core NEG - - -   Hep B Core Ext - - - -   Hep B Surf - - - -   Hep B Surf Ext  - - - -   HIV 1&2 NEG - - -        Recent Labs   Lab Test 02/20/18  1035 04/24/18  1030 05/22/18  1030   DOSTAC 02/19/18 2030 2030 4/23/18 05/21 2100   TACROL 10.0 5.4 8.8     Recent Labs   Lab Test 02/13/17  0718   DOSMPA Not Provided   MPACID [id number]*   MPAG 216.0*

## 2021-06-17 NOTE — LETTER
6/17/2021       RE: Angelica Romero  [address]  [facility] [address]     Dear Colleague,    Thank you for referring your patient, Angelica Romero, to the [facility] [place] at [facility]. Please see a copy of my visit note below.    Angelica is a 55 year old who is being evaluated via a billable telephone visit.      What phone number would you like to be contacted at? [phone]  How would you like to obtain your AVS? Mail a copy  Phone call duration: 22 minutes      CHRONIC TRANSPLANT NEPHROLOGY VISIT    Assessment & Plan   # DDKT: Stable   - Baseline Creatinine:  ~ 1.0-1.2   - Proteinuria: Minimal (0.2-0.5 grams)   - Date DSA Last Checked: Apr/2020      Latest DSA: No   - BK Viremia: Not checked recently due to time from transplant   - Kidney Tx Biopsy: No    # Immunosuppression: Tacrolimus immediate release (goal 4-6) and Mycophenolate mofetil (dose 750 mg every 12 hours)   - Continue with intensive monitoring of immunosuppression for efficacy and toxicity.   - Changes: No    # Infection Prophylaxis:   - PJP: Sulfa/TMP (Bactrim)    # Hypertension: Controlled;  Goal BP: < 130/80   - Changes: No    # Diabetes: Borderline control (HbA1c 7-9%) - recent diagnosis Last HbA1c: Unknown  Patient was recently started on metformin.   - Management as per primary care.    # Mineral Bone Disorder:   - Secondary renal hyperparathyroidism; PTH level: Not checked recently        On treatment: None  - Vitamin D; level: Not checked recently        On supplement: Yes  - Calcium; level: Normal        On supplement: No    # Electrolytes:   - Potassium; level: Normal        On supplement: No  - Magnesium; level: Not checked recently        On supplement: Yes  - Bicarbonate; level: Normal        On supplement: No    # Obesity, Class II (BMI = 38.3): Weight has been a bit up and down.   - Recommend weight loss for overall health by increasing exercise and watching  caloric intake.    # GERD: Mostly controlled on omeprazole.    # Skin Cancer: New lesions: none   - Discussed sun protection and recommend regular follow up with Dermatology.    # Medical Compliance: Yes     # COVID-19 Virus Review: Discussed COVID-19 virus and the potential medical risks.  Reviewed preventative health recommendations, which includes washing hands for 20 seconds, avoid touching your face, and social distancing.  Asked patient to inform the transplant center if they are exposed or diagnosed with this virus.    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    Return visit: Return in about 8 months (around 2/17/2022).    Bill Tejeda MD    Chief Complaint   Ms. Romero is a 55 year old here for kidney transplant and immunosuppression management.    History of Present Illness    Ms. Romero reports feeling good overall with some medical complaints.  She was diagnosed with diabetes last December and started on metformin.  Patient was then admitted to the hospital in January for a Shingles infection.  Since that time she has been doing well.  Her energy level is good and remains normal.  She is active, although only gets a little exercise.  Denies any chest pain or shortness of breath with exertion.  Some leg swelling, for which she wears compression stockings.    Appetite is good and her weight has been a bit up and down, but now at ~ 217 lbs.  No nausea, vomiting or diarrhea.  She will get occasional heartburn symptoms and takes omeprazole for this.  No fever, sweats or chills.    Home BP: 120/70s    Problem List   Patient Active Problem  List   Diagnosis     HTN, kidney transplant related     Obesity, unspecified     Kidney replaced by transplant     Immunosuppression (H)     Aftercare following organ transplant     Secondary renal hyperparathyroidism (H)     Vitamin D deficiency     Gastroesophageal reflux disease without esophagitis     Latent tuberculosis     Abnormal LFTs     Chronic kidney disease, stage 3     Hypomagnesemia       Allergies   No Known Allergies    Medications   Current Outpatient Medications   Medication Sig     amLODIPine (NORVASC) 10 MG tablet Take 1 tablet (10 mg) by mouth daily     blood glucose monitoring (FREESTYLE LITE) meter device kit See Admin Instructions     blood glucose monitoring (FREESTYLE) lancets      cholecalciferol (VITAMIN D) 1000 UNIT tablet Take 2 tablets (2,000 Units) by mouth daily     FREESTYLE LITE test strip      losartan (COZAAR) 50 MG tablet TAKE 1 TABLET BY MOUTH AT BEDTIME     magnesium oxide (MAG-OX) 400 MG tablet Take 2 tablets (800 mg) by mouth 2 times daily     metFORMIN (GLUCOPHAGE) 500 MG tablet TAKE 1 TABLET BY MOUTH TWICE DAILY WITH THE LARGEST MEAL(S) OF THE DAY     metoprolol tartrate (LOPRESSOR) 50 MG tablet Take 1 tablet (50 mg) by mouth 2 times daily     mycophenolate (GENERIC EQUIVALENT) 250 MG capsule Take 3 capsules (750 mg) by mouth 2 times daily     omeprazole (PRILOSEC) 20 MG CR capsule TAKE ONE CAPSULE BY MOUTH ONCE DAILY     sulfamethoxazole-trimethoprim (BACTRIM) 400-80 MG tablet Take 1 tablet by mouth Every Mon, Wed, Fri Morning     tacrolimus (GENERIC EQUIVALENT) 0.5 MG capsule HOLD     tacrolimus (GENERIC EQUIVALENT) 1 MG capsule Take 1 capsule (1 mg) by mouth 2 times daily     tacrolimus (GENERIC EQUIVALENT) 1 MG capsule Take 1 capsule (1 mg) by mouth 2 times daily (Patient not taking: Reported on 6/17/2021)     No current facility-administered medications for this visit.      Medications Discontinued During This Encounter   Medication Reason     omeprazole (PRILOSEC) 20  MG DR capsule      acetic acid (VOSOL) 2 % otic solution        Physical Exam   Vital signs and physical exam were deferred for this telemedicine visit.    Data     Renal Latest Ref Rng & Units 6/8/2021 5/3/2021 3/9/2021   Na 133 - 144 mmol/L - - -   Na (external) 133 - 144 mmol/L 139 141 141   K 3.4 - 5.3 mmol/L - - -   K (external) 3.4 - 5.1 mmol/L 4.0 4.2 3.4   Cl 94 - 109 mmol/L - - -   Cl (external) 96 - 109 mmol/L 104 104 103   CO2 20 - 32 mmol/L - - -   CO2 (external) 21 - 33 mmol/L 25 26 25   BUN 7 - 30 mg/dL - - -   BUN (external) 6 - 24 mg/dL 11 16 13   Cr 0.52 - 1.04 mg/dL - - -   Cr (external) 0.4 - 1.0 mg/dL 1.0 1.2(H) 1.0   Glucose 70 - 99 mg/dL - - -   Glucose (external) 70 - 99 mg/dL 133(H) 170(H) 148(H)   Ca  8.5 - 10.1 mg/dL - - -   Ca (external) 8.2 - 10.0 mg/dL 9.4 10.0 10.0   Mg 1.6 - 2.3 mg/dL - - -   Mg (external) 1.7 - 2.4 mg/dL - - -     Bone Health Latest Ref Rng & Units 12/18/2017 9/19/2017 9/12/2017   Phos 2.5 - 4.5 mg/dL 2.1(L) - 1.9(L)   Phos (external) 2.6 - 4.7 mg/dL - 1.8(L) -   PTHi 12 - 72 pg/mL 246(H) - 210(H)   Vit D Def 20 - 75 ug/L 28 - -     Heme Latest Ref Rng & Units 6/8/2021 5/3/2021 3/9/2021   WBC 4.0 - 11.0 10e9/L - - -   WBC (external) 4.1 - 10.9 x10:3/uL 5.3 6.2 4.7   Hgb 11.7 - 15.7 g/dL - - -   Hgb (external) 11.7 - 15.5 g/dL 14.8 15.1 14.1   Plt 150 - 450 10e9/L - - -   Plt (external) 150 - 450 x10:3/uL 204 246 220   ABSOLUTE NEUTROPHIL 1.6 - 8.3 10e9/L - - -   ABSOLUTE NEUTROPHILS (EXTERNAL) 1.9 - 8.1 x10:3/uL 3.0 - -   ABSOLUTE LYMPHOCYTES 0.8 - 5.3 10e9/L - - -   ABSOLUTE LYMPHOCYTES (EXTERNAL) 1.0 - 3.9 x10:3/uL 1.7 - -   ABSOLUTE MONOCYTES 0.0 - 1.3 10e9/L - - -   ABSOLUTE MONOCYTES (EXTERNAL) 0.1 - 0.9 x10:3/uL 0.4 - -   ABSOLUTE EOSINOPHILS 0.0 - 0.7 10e9/L - - -   ABSOLUTE EOSINOPHILS (EXTERNAL) 0.0 - 0.5 x10:3/uL 0.2 - -   ABSOLUTE BASOPHILS 0.0 - 0.2 10e9/L - - -   ABSOLUTE BASOPHILS (EXTERNAL) 0.0 - 0.2 x10:3/uL 0.0 - -   ABS IMMATURE GRANULOCYTES 0 - 0.4  10e9/L - - -   ABSOLUTE NUCLEATED RBC - - - -     Liver Latest Ref Rng & Units 5/7/2019 5/22/2018 4/24/2018   AP 40 - 150 U/L - - -   AP (external) 45 - 115 IU/L 93 92 90   TBili 0.2 - 1.3 mg/dL - - -   TBili (external) 0.4 - 1.4 mg/dL 0.8 0.8 0.9   DBili 0.0 - 0.2 mg/dL - - -   DBili (external) 0.0 - 0.3 mg/dL 0.2 0.2 0.3   ALT 0 - 50 U/L - - -   ALT (external) 7 - 55 U/L 133(H) 178(H) 248(H)   AST 0 - 45 U/L - - -   AST (external) 14 - 41 U/L 97(H) 166(H) 306(H)   Tot Protein 6.8 - 8.8 g/dL - - -   Tot Protein (external) 6.2 - 8.2 g/dL 7.4 6.9 7.1   Albumin 3.4 - 5.0 g/dL - - -   Albumin (external) 3.3 - 5.0 g/dL 3.6 3.4 3.5     Pancreas Latest Ref Rng & Units 2/6/2018 2/8/2017 9/10/2016   A1C 4.0 - 6.0 % 5.3 4.0(L) 4.7     Iron studies Latest Ref Rng & Units 2/6/2018 3/30/2017   Iron ug/dL 96 38   Iron sat 15 - 46 % - 17   Ferritin 26 - 252 ng/mL 3,499(A) 823(H)     [facility] Txp Virology Latest Ref Rng & Units 5/7/2019 3/7/2019 5/22/2018   CVM DNA Quant - - - -   CMV DNA Quant Ext Undetected IU/mL Undetected - <137(A)   CMV QUANT IU/ML CMVND:CMV DNA Not Detected [IU]/mL - - -   LOG IU/ML OF CMVQNT <2.1 [Log:IU]/mL - - -   LOG IU/ML OF CMVQNT (EXTERNAL) log IU/mL - - <2.14   BK Spec - - - -   BK Res BKNEG:BK Virus DNA Not Detected copies/mL - - -   BK Log <2.7 Log copies/mL - - -   BK Quant Result Ext None detected - None detected -   BK Quant Spec Ext - - Plasma -   EBV VCA IGG ANTIBODY U/mL - - -   EBV CAPSID ANTIBODY IGG 0.0 - 0.8 AI - - -   EBV DNA QUANT (EXTERNAL) Undetected IU/mL - - -   EBV DNA COPIES/ML EBVNEG:EBV DNA Not Detected [Copies]/mL - - -   EBV DNA LOG OF COPIES <2.7 [Log:copies]/mL - - -   EBV DNA LOG OF COPIES (EXTERNAL) log IU/mL - - -   Hep B Core NEG - - -   Hep B Core Ext - - - -   Hep B Surf - - - -   Hep B Surf Ext  - - - -   HIV 1&2 NEG - - -        Recent Labs   Lab Test 02/20/18  1035 04/24/18  1030 05/22/18  1030   DOSTAC 02/19/18 2030 2030 4/23/18 05/21 2100   TACROL 10.0 5.4 8.8      Recent Labs   Lab Test 02/13/17  0718   DOSMPA Not Provided   MPACID 0.57*   MPAG 216.0*       Again, thank you for allowing me to participate in the care of your patient.      Sincerely,    Bill Tejeda MD

## 2021-07-07 ENCOUNTER — TELEPHONE (OUTPATIENT)
Dept: TRANSPLANT | Facility: CLINIC | Age: 56
End: 2021-07-07

## 2021-07-07 PROBLEM — E83.42 HYPOMAGNESEMIA: Status: ACTIVE | Noted: 2021-07-07

## 2021-07-07 PROBLEM — E83.39 HYPOPHOSPHATEMIA: Status: RESOLVED | Noted: 2017-03-30 | Resolved: 2021-07-07

## 2021-07-07 PROBLEM — N18.30 CHRONIC KIDNEY DISEASE, STAGE 3 (H): Status: ACTIVE | Noted: 2021-07-07

## 2021-07-07 NOTE — LETTER
PHYSICIAN ORDERS      DATE & TIME ISSUED: 2021 1:33 PM  PATIENT NAME: Angelica Romero   : 1965     [facility] MR# [if applicable]: [id number]     DIAGNOSIS:  kidney transplant   ICD-10 CODE: Z94.0    Please obtain the following with next lab draw    Vitamin D   Magnesium     Any questions please call: [phone]  Please fax results to [phone].    .

## 2021-07-07 NOTE — TELEPHONE ENCOUNTER
Lab orders faxed.     Bill Tejeda MD Hasebroock, Rebecca, RN             Please check the following labs: Vitamin D and Magnesium.

## 2021-09-14 DIAGNOSIS — I10 ESSENTIAL HYPERTENSION: ICD-10-CM

## 2021-09-14 DIAGNOSIS — Z94.0 KIDNEY REPLACED BY TRANSPLANT: Primary | ICD-10-CM

## 2021-09-14 DIAGNOSIS — Z48.298 AFTERCARE FOLLOWING ORGAN TRANSPLANT: ICD-10-CM

## 2021-09-14 DIAGNOSIS — Z94.0 DECEASED-DONOR KIDNEY TRANSPLANT: ICD-10-CM

## 2021-09-14 RX ORDER — TACROLIMUS 1 MG/1
1 CAPSULE ORAL 2 TIMES DAILY
Qty: 60 CAPSULE | Refills: 11 | Status: SHIPPED | OUTPATIENT
Start: 2021-09-14 | End: 2023-02-10

## 2021-10-02 ENCOUNTER — HEALTH MAINTENANCE LETTER (OUTPATIENT)
Age: 56
End: 2021-10-02

## 2021-12-07 ENCOUNTER — TELEPHONE (OUTPATIENT)
Dept: TRANSPLANT | Facility: CLINIC | Age: 56
End: 2021-12-07
Payer: COMMERCIAL

## 2021-12-07 NOTE — LETTER
OUTPATIENT LABORATORY TEST ORDER    Patient Name: Angelica Romero  Transplant Date: 2/8/2017   YOB: 1965                        Issue Date & Time: 12/7/2021  11:44 AM  [facility] MR: [id number] Exp. Date (1 year after date issued)      Diagnoses: Kidney Transplant (ICD-10  Z94.0)   Long term use of medications (ICD-10  Z79.899)     Lab results to be available on the same day drawn.   Patient should release information to the [facility].  Please fax to the Transplant Center at [phone].    Every other month   ?Hemogram and Platelet  ?Basic Metabolic Panel (Sodium, Potassium, Chloride, CO2, Creatinine, Urea Nitrogen,     Glucose,   Calcium)         ?/Tacrolimus/Prograf drug level                          Every 6 Months                  ?Urine for protein/creatinine    Yearly   ?PRA/DSA level (mailers provided by the patient)      If you have any questions, please call The Transplant Center at [phone] or [phone].    Please fax labs to [phone]  .

## 2021-12-07 NOTE — TELEPHONE ENCOUNTER
Current standing lab orders faxed to patients local lab and sent copy to patient via [employer] in [place].

## 2021-12-07 NOTE — TELEPHONE ENCOUNTER
Provider Call: Transplant Provider  [facility] needs updated standing lab order  Fax# [phone]   Callback needed? No

## 2021-12-08 ENCOUNTER — TELEPHONE (OUTPATIENT)
Dept: TRANSPLANT | Facility: CLINIC | Age: 56
End: 2021-12-08
Payer: COMMERCIAL

## 2021-12-08 NOTE — TELEPHONE ENCOUNTER
ISSUE  Glucose 298-diabetes managed by PCP.  Creatinine 1.0 @ baseline.    PLAN  Call Angelica:  Assess if this was a fasting lab.  Advise her to follow up with PCP for better blood sugar control.      OUTCOME  Call placed to Angelica. No answer. Unable to leave voice message. My chart message sent.

## 2022-01-22 ENCOUNTER — HEALTH MAINTENANCE LETTER (OUTPATIENT)
Age: 57
End: 2022-01-22

## 2022-03-19 ENCOUNTER — HEALTH MAINTENANCE LETTER (OUTPATIENT)
Age: 57
End: 2022-03-19

## 2022-03-22 DIAGNOSIS — Z94.0 DECEASED-DONOR KIDNEY TRANSPLANT: ICD-10-CM

## 2022-03-22 DIAGNOSIS — I10 ESSENTIAL HYPERTENSION: ICD-10-CM

## 2022-03-22 DIAGNOSIS — Z94.0 KIDNEY REPLACED BY TRANSPLANT: ICD-10-CM

## 2022-03-22 DIAGNOSIS — Z48.298 AFTERCARE FOLLOWING ORGAN TRANSPLANT: ICD-10-CM

## 2022-03-22 RX ORDER — MYCOPHENOLATE MOFETIL 250 MG/1
CAPSULE ORAL
Qty: 180 CAPSULE | Refills: 11 | Status: SHIPPED | OUTPATIENT
Start: 2022-03-22 | End: 2023-04-07

## 2022-03-22 RX ORDER — SULFAMETHOXAZOLE AND TRIMETHOPRIM 400; 80 MG/1; MG/1
TABLET ORAL
Qty: 13 TABLET | Refills: 11 | Status: SHIPPED | OUTPATIENT
Start: 2022-03-22 | End: 2023-04-24

## 2022-03-25 ENCOUNTER — TRANSFERRED RECORDS (OUTPATIENT)
Dept: HEALTH INFORMATION MANAGEMENT | Facility: CLINIC | Age: 57
End: 2022-03-25

## 2022-04-05 LAB
CREATININE (EXTERNAL): 0.9 MG/DL (ref 0.4–1)
GFR ESTIMATED (EXTERNAL): 67.1 ML/MIN (ref 60–150)
GLUCOSE (EXTERNAL): 227 MG/DL (ref 70–99)
POTASSIUM (EXTERNAL): 3.7 MMOL/L (ref 3.4–5.1)

## 2022-05-13 DIAGNOSIS — I10 ESSENTIAL HYPERTENSION: ICD-10-CM

## 2022-05-13 DIAGNOSIS — Z94.0 DECEASED-DONOR KIDNEY TRANSPLANT: ICD-10-CM

## 2022-05-13 DIAGNOSIS — Z94.0 KIDNEY REPLACED BY TRANSPLANT: ICD-10-CM

## 2022-05-13 DIAGNOSIS — Z48.298 AFTERCARE FOLLOWING ORGAN TRANSPLANT: ICD-10-CM

## 2022-05-13 RX ORDER — METOPROLOL TARTRATE 50 MG
TABLET ORAL
Qty: 60 TABLET | Refills: 11 | Status: SHIPPED | OUTPATIENT
Start: 2022-05-13

## 2022-05-13 RX ORDER — AMLODIPINE BESYLATE 10 MG/1
TABLET ORAL
Qty: 30 TABLET | Refills: 11 | Status: SHIPPED | OUTPATIENT
Start: 2022-05-13

## 2022-05-14 ENCOUNTER — HEALTH MAINTENANCE LETTER (OUTPATIENT)
Age: 57
End: 2022-05-14

## 2022-06-07 LAB
CREATININE (EXTERNAL): 1 MG/DL (ref 0.4–1)
GFR ESTIMATED (EXTERNAL): 56.5 ML/MIN (ref 60–150)
GLUCOSE (EXTERNAL): 293 MG/DL (ref 70–99)
POTASSIUM (EXTERNAL): 3.7 MMOL/L (ref 3.4–5.1)

## 2022-08-12 DIAGNOSIS — Z94.0 KIDNEY REPLACED BY TRANSPLANT: Primary | ICD-10-CM

## 2022-08-15 LAB
ALBUMIN (URINE) MG/SPEC: 9 MG/DL
ALBUMIN/CREATININE RATIO: 64 UG/MG (ref 0–30)
CHOLESTEROL (EXTERNAL): 227 MG/DL (ref 100–200)
CREATININE (URINE): 141.1 MG/DL
HBA1C MFR BLD: 8.4 % (ref 4–6)
HDLC SERPL-MCNC: 34 MG/DL (ref 40–59)
LDL CHOLESTEROL (EXTERNAL): 134 MG/DL (ref 80–130)
NON HDL CHOLESTEROL (EXTERNAL): 193 MG/DL
TRIGLYCERIDES (EXTERNAL): 297 MG/DL (ref 20–149)

## 2022-08-19 DIAGNOSIS — Z48.298 AFTERCARE FOLLOWING ORGAN TRANSPLANT: Primary | ICD-10-CM

## 2022-08-19 RX ORDER — TACROLIMUS 1 MG/1
CAPSULE ORAL
Qty: 60 CAPSULE | Refills: 11 | Status: SHIPPED | OUTPATIENT
Start: 2022-08-19 | End: 2023-09-27

## 2022-09-03 ENCOUNTER — HEALTH MAINTENANCE LETTER (OUTPATIENT)
Age: 57
End: 2022-09-03

## 2022-11-15 ENCOUNTER — TRANSFERRED RECORDS (OUTPATIENT)
Dept: HEALTH INFORMATION MANAGEMENT | Facility: CLINIC | Age: 57
End: 2022-11-15

## 2022-11-15 LAB — HBA1C MFR BLD: 8.7 % (ref 4–6)

## 2023-02-06 ENCOUNTER — TELEPHONE (OUTPATIENT)
Dept: TRANSPLANT | Facility: CLINIC | Age: 58
End: 2023-02-06
Payer: COMMERCIAL

## 2023-02-06 ENCOUNTER — TRANSFERRED RECORDS (OUTPATIENT)
Dept: HEALTH INFORMATION MANAGEMENT | Facility: CLINIC | Age: 58
End: 2023-02-06

## 2023-02-06 NOTE — TELEPHONE ENCOUNTER
[facility]  FAX# [phone]   Needs order asap  patient is at the clinic right now

## 2023-02-10 ENCOUNTER — TELEPHONE (OUTPATIENT)
Dept: TRANSPLANT | Facility: CLINIC | Age: 58
End: 2023-02-10
Payer: COMMERCIAL

## 2023-02-10 NOTE — LETTER
PHYSICIAN ORDERS      DATE & TIME ISSUED: 2023  8:08 AM  PATIENT NAME: Aneglica Romero   : 1965     [facility] MR# [if applicable]: [id number]     DIAGNOSIS:  kidney transplant  ICD-10 CODE: Z94.0    Please repeat the following labs:   BMP   CBC   Tacrolimus drug level    Any questions please call: [phone]  Please fax results to [phone].    .

## 2023-02-10 NOTE — TELEPHONE ENCOUNTER
ISSUE:   Tacrolimus IR level 8.0 on 2/6/23, goal 4-6, dose 1 mg BID.    PLAN:   Please call patient and confirm this was an accurate 12-hour trough. Verify Tacrolimus IR dose 1 mg BID. Confirm no new medications or illness. Confirm no missed doses. If accurate trough and accurate dose, stay on the same dose and repeat lab within 1 week.     LPN TASK:   Please contact patient to assess and review the plan.  Update prescription and place repeat lab orders as necessary.

## 2023-02-13 NOTE — TELEPHONE ENCOUNTER
[employer] message sent to patient regarding:  Tacrolimus IR level 8.0 on 2/6/23, goal 4-6, dose 1 mg BID.     PLAN:   Please call patient and confirm this was an accurate 12-hour trough. Verify Tacrolimus IR dose 1 mg BID. Confirm no new medications or illness. Confirm no missed doses. If accurate trough and accurate dose, stay on the same dose and repeat lab within 1 week.

## 2023-04-07 DIAGNOSIS — Z94.0 DECEASED-DONOR KIDNEY TRANSPLANT: ICD-10-CM

## 2023-04-07 DIAGNOSIS — Z94.0 KIDNEY REPLACED BY TRANSPLANT: ICD-10-CM

## 2023-04-07 DIAGNOSIS — Z94.0 KIDNEY REPLACED BY TRANSPLANT: Primary | ICD-10-CM

## 2023-04-07 DIAGNOSIS — Z48.298 AFTERCARE FOLLOWING ORGAN TRANSPLANT: ICD-10-CM

## 2023-04-07 DIAGNOSIS — I10 ESSENTIAL HYPERTENSION: ICD-10-CM

## 2023-04-07 RX ORDER — MYCOPHENOLATE MOFETIL 250 MG/1
CAPSULE ORAL
Qty: 180 CAPSULE | Refills: 11 | Status: SHIPPED | OUTPATIENT
Start: 2023-04-07 | End: 2024-04-08

## 2023-04-24 DIAGNOSIS — I10 ESSENTIAL HYPERTENSION: ICD-10-CM

## 2023-04-24 DIAGNOSIS — Z48.298 AFTERCARE FOLLOWING ORGAN TRANSPLANT: ICD-10-CM

## 2023-04-24 DIAGNOSIS — Z94.0 DECEASED-DONOR KIDNEY TRANSPLANT: ICD-10-CM

## 2023-04-24 DIAGNOSIS — Z94.0 KIDNEY REPLACED BY TRANSPLANT: Primary | ICD-10-CM

## 2023-04-24 RX ORDER — SULFAMETHOXAZOLE AND TRIMETHOPRIM 400; 80 MG/1; MG/1
1 TABLET ORAL
Qty: 13 TABLET | Refills: 11 | Status: SHIPPED | OUTPATIENT
Start: 2023-04-24 | End: 2024-05-14

## 2023-05-22 ENCOUNTER — TELEPHONE (OUTPATIENT)
Dept: NEPHROLOGY | Facility: CLINIC | Age: 58
End: 2023-05-22
Payer: MEDICARE

## 2023-05-22 NOTE — LETTER
OUTPATIENT LABORATORY TEST ORDER    Patient Name: Angelica Romero  Transplant Date: 2/8/2017   YOB: 1965                        Issue Date & Time: 5/22/2023  11:55 AM  [facility] MR: [id number] Exp. Date (1 year after date issued)      Diagnoses: Kidney Transplant (ICD-10  Z94.0)   Long term use of medications (ICD-10  Z79.899)     Lab results to be available on the same day drawn.   Patient should release information to the [facility].  Please fax to the Transplant Center at [phone].    Every 3 months   ?Hemogram and Platelet  ?Basic Metabolic Panel (Sodium, Potassium, Chloride, CO2, Creatinine, Urea Nitrogen,     Glucose,   Calcium)         ?/Tacrolimus/Prograf drug level                          Every 6 Months                  ?Urine for protein/creatinine      If you have any questions, please call The Transplant Center at [phone] or [phone].    Please fax labs to [phone]  .

## 2023-05-22 NOTE — TELEPHONE ENCOUNTER
Nephrology Patient Completing Outside Labs    Appointment Date & Time: 9/25/2023    Preferred Lab: [facility]    Preferred Lab Locations: [facility]) [address] [place] [address]    Fax: [phone]    Phone Number: [phone]

## 2023-06-03 ENCOUNTER — HEALTH MAINTENANCE LETTER (OUTPATIENT)
Age: 58
End: 2023-06-03

## 2023-06-20 ENCOUNTER — TRANSFERRED RECORDS (OUTPATIENT)
Dept: HEALTH INFORMATION MANAGEMENT | Facility: CLINIC | Age: 58
End: 2023-06-20

## 2023-08-15 ENCOUNTER — TRANSFERRED RECORDS (OUTPATIENT)
Dept: HEALTH INFORMATION MANAGEMENT | Facility: CLINIC | Age: 58
End: 2023-08-15

## 2023-09-27 DIAGNOSIS — Z48.298 AFTERCARE FOLLOWING ORGAN TRANSPLANT: ICD-10-CM

## 2023-09-27 RX ORDER — TACROLIMUS 1 MG/1
1 CAPSULE ORAL 2 TIMES DAILY
Qty: 60 CAPSULE | Refills: 0 | Status: SHIPPED | OUTPATIENT
Start: 2023-09-27 | End: 2023-10-23

## 2023-09-30 ENCOUNTER — HEALTH MAINTENANCE LETTER (OUTPATIENT)
Age: 58
End: 2023-09-30

## 2023-10-09 ENCOUNTER — TELEPHONE (OUTPATIENT)
Dept: TRANSPLANT | Facility: CLINIC | Age: 58
End: 2023-10-09
Payer: MEDICARE

## 2023-10-09 DIAGNOSIS — Z94.0 KIDNEY TRANSPLANTED: Primary | ICD-10-CM

## 2023-10-09 NOTE — TELEPHONE ENCOUNTER
[employer] message sent to Angelica asking her to obtain the following labs with her next lab draw and that I will have scheduling reach out to her to reschedule.  Orders placed.      Bill Tejeda MD Buboltz, Brittany J, RN  Patient was a no show for her clinic visit last week.    Please check the following labs: PTH, Vitamin D, Magnesium, and DSA.   Pt awake, alert and stable.

## 2023-10-23 DIAGNOSIS — Z48.298 AFTERCARE FOLLOWING ORGAN TRANSPLANT: Primary | ICD-10-CM

## 2023-10-23 DIAGNOSIS — Z98.890 OTHER SPECIFIED POSTPROCEDURAL STATES: ICD-10-CM

## 2023-10-23 DIAGNOSIS — Z94.0 KIDNEY REPLACED BY TRANSPLANT: ICD-10-CM

## 2023-10-23 DIAGNOSIS — Z79.899 ENCOUNTER FOR LONG-TERM CURRENT USE OF MEDICATION: ICD-10-CM

## 2023-10-23 RX ORDER — TACROLIMUS 1 MG/1
1 CAPSULE ORAL 2 TIMES DAILY
Qty: 60 CAPSULE | Refills: 0 | Status: SHIPPED | OUTPATIENT
Start: 2023-10-23 | End: 2024-01-05

## 2023-10-23 RX ORDER — TACROLIMUS 1 MG/1
1 CAPSULE ORAL 2 TIMES DAILY
Qty: 60 CAPSULE | Refills: 0 | Status: SHIPPED | OUTPATIENT
Start: 2023-10-23 | End: 2023-11-13

## 2023-11-07 ENCOUNTER — TELEPHONE (OUTPATIENT)
Dept: TRANSPLANT | Facility: CLINIC | Age: 58
End: 2023-11-07
Payer: MEDICARE

## 2023-11-07 DIAGNOSIS — Z22.7 LATENT TUBERCULOSIS BY BLOOD TEST: ICD-10-CM

## 2023-11-07 DIAGNOSIS — Z94.0 KIDNEY REPLACED BY TRANSPLANT: ICD-10-CM

## 2023-11-07 DIAGNOSIS — Z48.298 AFTERCARE FOLLOWING ORGAN TRANSPLANT: ICD-10-CM

## 2023-11-07 DIAGNOSIS — Z94.0 DECEASED-DONOR KIDNEY TRANSPLANT: ICD-10-CM

## 2023-11-07 NOTE — TELEPHONE ENCOUNTER
ISSUE:   Tacrolimus IR level 7.7 on 11/2/23, goal 4-6, dose 1 mg BID.    PLAN:   Please call patient and confirm this was an accurate 12-hour trough. Verify Tacrolimus IR dose 1 mg BID. Confirm no new medications or illness. Confirm no missed doses. If accurate trough and accurate dose, decrease Tacrolimus IR dose to 1/0.5 mg BID and repeat labs in 2 weeks    OUTCOME:   Left message for pt to call back to discuss tacrolimus dosing change.

## 2023-11-07 NOTE — LETTER
PHYSICIAN ORDERS      DATE & TIME ISSUED: 2023 5:34 PM  PATIENT NAME: Angelica Romero   : 1965     [facility] MR# [if applicable]: [id number]     DIAGNOSIS:  Kidney transplant   ICD-10 CODE: Z94.0      Please repeat the following level in 2 weeks  Tacrolimus level (ensure 12 hours between last dose and blood draw)    Any questions please call: [phone] option 5    [phone].    .

## 2023-11-10 NOTE — TELEPHONE ENCOUNTER
[employer] message sent to patient regarding:  Tacrolimus IR level 7.7 on 11/2/23, goal 4-6, dose 1 mg BID.     PLAN:   Please call patient and confirm this was an accurate 12-hour trough. Verify Tacrolimus IR dose 1 mg BID. Confirm no new medications or illness. Confirm no missed doses. If accurate trough and accurate dose, decrease Tacrolimus IR dose to 1/0.5 mg BID and repeat labs in 2 weeks

## 2023-11-13 NOTE — TELEPHONE ENCOUNTER
Call placed to patient. Patient confirms current dose and accurate trough level. Denies any recent illness, diarrhea or medication changes. Patient v\u to decrease tacrolimus dose to 1/0.5 and repeat level in 2 weeks. Order sent

## 2023-11-14 RX ORDER — TACROLIMUS 1 MG/1
1 CAPSULE ORAL EVERY MORNING
Qty: 30 CAPSULE | Refills: 0 | Status: SHIPPED | OUTPATIENT
Start: 2023-11-14 | End: 2024-07-23

## 2023-11-14 RX ORDER — TACROLIMUS 0.5 MG/1
0.5 CAPSULE ORAL EVERY EVENING
Qty: 30 CAPSULE | Refills: 11 | Status: SHIPPED | OUTPATIENT
Start: 2023-11-14 | End: 2024-01-05

## 2023-12-14 NOTE — TELEPHONE ENCOUNTER
Patient returned call and confirmed good 12 hour trough level.  Patient verbalizes understanding of medication dose decrease to 2.5 mg BID.     Detail Level: Detailed Detail Level: Zone

## 2024-01-03 ENCOUNTER — TELEPHONE (OUTPATIENT)
Dept: TRANSPLANT | Facility: CLINIC | Age: 59
End: 2024-01-03
Payer: MEDICARE

## 2024-01-03 DIAGNOSIS — Z94.0 KIDNEY REPLACED BY TRANSPLANT: Primary | ICD-10-CM

## 2024-01-03 NOTE — LETTER
OUTPATIENT LABORATORY TEST ORDER    Patient Name: Angelica Romero  Transplant Date: 2/8/2017   YOB: 1965                        Issue Date & Time: 01/03/24 10:59 AM   [facility] MR: [id number] Exp. Date (1 year after date issued)      Diagnoses: Kidney Transplant (ICD-10  Z94.0)   Long term use of medications (ICD-10  Z79.899)   Hypercalcemia (E83.52)   Mineral Bone Disease (N25.0)     Lab results to be available on the same day drawn.   Patient should release information to the [facility] Transplant [facility].  Please fax to the Transplant Center at [phone].    With next labs please draw x1:  ? Magnesium level  ? Vitamin D deficiency        ? Parathyroid Hormone    Every 3 months   ?Hemogram and Platelet  ?Basic Metabolic Panel (Sodium, Potassium, Chloride, CO2, Creatinine, Urea Nitrogen,     Glucose,   Calcium)         ?/Tacrolimus/Prograf drug level                          Every 6 Months                  ?Urine for protein/creatinine      If you have any questions, please call The Transplant Center at [phone] or [phone].    Please fax labs to [phone]  .

## 2024-01-03 NOTE — TELEPHONE ENCOUNTER
ISSUE  Calcium level 10.2, previously 10.5 on 11/2/23    PLAN/OUTCOME  Called and spoke to pt regarding hypercalcemia. She denies any supplements, vitamin D, calcium or TUMS. Pt also noted she stopped her magnesium at some point (removed this from med list).     Discussed pt is overdue for PTH, magnesium level, vitamin D. Will send lab letter to her [place] lab to draw.     Pt confirms she received the call in November to reduce her tacrolimus dose to 1mg/0.5mg and that her level from 1/2/24 was a good 12 hour trough. Will wait results to see if she is now in range of 4-6. Discussed this at length.     Pt is also very overdue for annual transplant visit. Orders placed and transferred her to schedule to arrange a virtual visit on 3/7/24.

## 2024-01-04 DIAGNOSIS — Z94.0 KIDNEY TRANSPLANTED: Primary | ICD-10-CM

## 2024-01-04 DIAGNOSIS — Z48.298 AFTERCARE FOLLOWING ORGAN TRANSPLANT: ICD-10-CM

## 2024-01-04 DIAGNOSIS — Z94.0 DECEASED-DONOR KIDNEY TRANSPLANT: ICD-10-CM

## 2024-01-04 DIAGNOSIS — Z94.0 KIDNEY REPLACED BY TRANSPLANT: ICD-10-CM

## 2024-01-04 DIAGNOSIS — Z22.7 LATENT TUBERCULOSIS BY BLOOD TEST: ICD-10-CM

## 2024-01-04 NOTE — LETTER
PHYSICIAN ORDERS      DATE & TIME ISSUED: 2024 4:10 PM  PATIENT NAME: Angelica Romero   : 1965     [facility] MR# [if applicable]: [id number]     DIAGNOSIS:  Kidney Transplant  ICD-10 CODE: Z94.0     Please repeat the following labs in 1-2 weeks:  Tacrolimus drug level  CBC  BMP    Any questions please call: [phone]    Please fax each result same day as resulted/available    Critical lab results page [phone]  Please fax lab results to [phone].    .

## 2024-01-04 NOTE — TELEPHONE ENCOUNTER
ISSUE:   Tacrolimus IR level 2.3 on 1/2/24, goal 4-6, dose 1 mg in the AM and 0.5 mg in the PM.    PLAN:   Call Patient and confirm this was an accurate 12-hour trough.   Verify Tacrolimus IR dose 1.0 mg in the AM and 0.5 mg in the PM.   Confirm no new medications or illness.   Confirm no missed doses.   If accurate trough and accurate dose, increase Tacrolimus IR dose to 1 mg BID.    *If any missed doses, continue current dose and repeat a level, ensuring no missed doses and good 12 hour trough, in 1-2 weeks.    Is this more than a 50% increase or decrease in current IS dose: No  If YES, justification: N/A.    Repeat labs in 1-2 weeks after dose change.    OUTCOME:   Spoke with Patient, they confirm accurate trough level and current dose 1/0.5 mg BID.   Patientconfirmed dose change to 1 mg BID.  Patientagreed to repeat labs in 1-2 weeks.   Orders sent to preferred pharmacy for dose change and lab for repeat labs.   Patientvoiced understanding of plan.

## 2024-01-05 RX ORDER — TACROLIMUS 1 MG/1
1 CAPSULE ORAL 2 TIMES DAILY
Qty: 60 CAPSULE | Refills: 11 | Status: SHIPPED | OUTPATIENT
Start: 2024-01-05 | End: 2024-06-14

## 2024-01-05 RX ORDER — TACROLIMUS 0.5 MG/1
CAPSULE ORAL
Start: 2024-01-05 | End: 2024-07-23

## 2024-02-07 ENCOUNTER — TELEPHONE (OUTPATIENT)
Dept: TRANSPLANT | Facility: CLINIC | Age: 59
End: 2024-02-07
Payer: MEDICARE

## 2024-02-07 NOTE — TELEPHONE ENCOUNTER
Called pt (1st attempt) for the patient to call back and schedule the following:    Appointment type: RKTV  Provider: LAURA  Return date: NEXT AVAILABLE  Specialty phone number: [phone]  Additional appointment(s) needed: NA  Additonal Notes: RESCHEDULE NEEDED. Did not go to .

## 2024-02-08 ENCOUNTER — TRANSFERRED RECORDS (OUTPATIENT)
Dept: HEALTH INFORMATION MANAGEMENT | Facility: CLINIC | Age: 59
End: 2024-02-08
Payer: MEDICARE

## 2024-02-09 ENCOUNTER — TELEPHONE (OUTPATIENT)
Dept: TRANSPLANT | Facility: CLINIC | Age: 59
End: 2024-02-09
Payer: MEDICARE

## 2024-02-09 NOTE — TELEPHONE ENCOUNTER
Spoke with patient and rescheduled RKTV appt with Dr. Lainez from 3/7/24 to 5/14/24 in person RKT visit.

## 2024-02-13 ENCOUNTER — TELEPHONE (OUTPATIENT)
Dept: TRANSPLANT | Facility: CLINIC | Age: 59
End: 2024-02-13
Payer: MEDICARE

## 2024-02-13 NOTE — TELEPHONE ENCOUNTER
ISSUE:   Tacrolimus IR level 3.7 On 2/8/24, goal 4-6, dose 1 mg BID.    PLAN:   Call Patientand confirm this was an accurate 12-hour trough.   Verify Tacrolimus IR dose 1 mg BID.   Confirm no new medications or illness.   Confirm no missed doses.       OUTCOME:   Spoke to pt who states it was a 15 hour trough. Pt will repeat labs in a few weeks to ensure 12 hour trough obtained. No dose changes. Orders sent.

## 2024-02-13 NOTE — LETTER
PHYSICIAN ORDERS      DATE & TIME ISSUED: 24 11:49 AM   PATIENT NAME: Angelica Romero   : 1965     [facility] MR# [if applicable]: [id number]     DIAGNOSIS:  Kidney Transplant  ICD-10 CODE: Z94.0     Please repeat the following labs in about 1 month:  Tacrolimus drug level  CBC  BMP    Any questions please call: [phone]    Please fax each result same day as resulted/available    Critical lab results page [phone]  Please fax lab results to [phone].    .

## 2024-04-08 DIAGNOSIS — I10 ESSENTIAL HYPERTENSION: ICD-10-CM

## 2024-04-08 DIAGNOSIS — Z94.0 KIDNEY REPLACED BY TRANSPLANT: Primary | ICD-10-CM

## 2024-04-08 DIAGNOSIS — Z48.298 AFTERCARE FOLLOWING ORGAN TRANSPLANT: ICD-10-CM

## 2024-04-08 DIAGNOSIS — Z94.0 DECEASED-DONOR KIDNEY TRANSPLANT: ICD-10-CM

## 2024-04-08 RX ORDER — MYCOPHENOLATE MOFETIL 250 MG/1
750 CAPSULE ORAL 2 TIMES DAILY
Qty: 180 CAPSULE | Refills: 11 | Status: SHIPPED | OUTPATIENT
Start: 2024-04-08

## 2024-04-27 ENCOUNTER — HEALTH MAINTENANCE LETTER (OUTPATIENT)
Age: 59
End: 2024-04-27

## 2024-05-08 ENCOUNTER — TELEPHONE (OUTPATIENT)
Dept: TRANSPLANT | Facility: CLINIC | Age: 59
End: 2024-05-08
Payer: MEDICARE

## 2024-05-08 NOTE — TELEPHONE ENCOUNTER
ISSUE:   Creatinine 1.22 on 5/7/24 (baseline 1.0-1.2)    PLAN:  Call patient and check for recent illness.  Any fever?  Any missed medication?  Changes in medication, (wesley diuretics)?  Any pain over the transplanted kidney?  Any nausea / vomiting / diarrhea?  Dehydrated? Is BP low? Any dizziness or light headedness when standing or walking?    Please ask pt to slightly increase her water intake.   No need to repeat labs early as she is near baseline but just needs to ensure she is drinking plenty of fluids.     LPN TASK:   Please contact patient to assess and review the plan.  Update prescription and place repeat lab orders as necessary.

## 2024-05-08 NOTE — TELEPHONE ENCOUNTER
Spoke to patient who denies any recent illness.  Patient denies any fever, missed medication, changes in medication, pain over the transplanted kidney.  Patient denies any nausea / vomiting / diarrhea.  Encouraged patient to increase her water intake.

## 2024-05-14 DIAGNOSIS — Z48.298 AFTERCARE FOLLOWING ORGAN TRANSPLANT: ICD-10-CM

## 2024-05-14 DIAGNOSIS — Z94.0 KIDNEY REPLACED BY TRANSPLANT: Primary | ICD-10-CM

## 2024-05-14 DIAGNOSIS — Z94.0 DECEASED-DONOR KIDNEY TRANSPLANT: ICD-10-CM

## 2024-05-14 DIAGNOSIS — I10 ESSENTIAL HYPERTENSION: ICD-10-CM

## 2024-05-14 RX ORDER — SULFAMETHOXAZOLE AND TRIMETHOPRIM 400; 80 MG/1; MG/1
1 TABLET ORAL
Qty: 13 TABLET | Refills: 11 | Status: SHIPPED | OUTPATIENT
Start: 2024-05-15

## 2024-06-14 ENCOUNTER — VIRTUAL VISIT (OUTPATIENT)
Dept: TRANSPLANT | Facility: CLINIC | Age: 59
End: 2024-06-14
Attending: INTERNAL MEDICINE
Payer: COMMERCIAL

## 2024-06-14 DIAGNOSIS — E66.812 CLASS 2 OBESITY DUE TO EXCESS CALORIES WITHOUT SERIOUS COMORBIDITY WITH BODY MASS INDEX (BMI) OF 37.0 TO 37.9 IN ADULT: ICD-10-CM

## 2024-06-14 DIAGNOSIS — Z48.298 AFTERCARE FOLLOWING ORGAN TRANSPLANT: ICD-10-CM

## 2024-06-14 DIAGNOSIS — D84.9 IMMUNOSUPPRESSED STATUS (H): ICD-10-CM

## 2024-06-14 DIAGNOSIS — Z94.0 HTN, KIDNEY TRANSPLANT RELATED: ICD-10-CM

## 2024-06-14 DIAGNOSIS — Z94.0 KIDNEY REPLACED BY TRANSPLANT: Primary | ICD-10-CM

## 2024-06-14 DIAGNOSIS — E11.9 TYPE 2 DIABETES MELLITUS WITHOUT COMPLICATION, WITHOUT LONG-TERM CURRENT USE OF INSULIN (H): ICD-10-CM

## 2024-06-14 DIAGNOSIS — E66.09 CLASS 2 OBESITY DUE TO EXCESS CALORIES WITHOUT SERIOUS COMORBIDITY WITH BODY MASS INDEX (BMI) OF 37.0 TO 37.9 IN ADULT: ICD-10-CM

## 2024-06-14 DIAGNOSIS — R80.1 PERSISTENT PROTEINURIA: ICD-10-CM

## 2024-06-14 DIAGNOSIS — I15.1 HTN, KIDNEY TRANSPLANT RELATED: ICD-10-CM

## 2024-06-14 PROBLEM — N18.31 STAGE 3A CHRONIC KIDNEY DISEASE (H): Status: ACTIVE | Noted: 2021-07-07

## 2024-06-14 PROBLEM — R79.89 ABNORMAL LFTS: Status: RESOLVED | Noted: 2018-04-30 | Resolved: 2024-06-14

## 2024-06-14 PROBLEM — Z22.7 LATENT TUBERCULOSIS: Status: RESOLVED | Noted: 2017-03-30 | Resolved: 2024-06-14

## 2024-06-14 PROCEDURE — G2211 COMPLEX E/M VISIT ADD ON: HCPCS | Performed by: INTERNAL MEDICINE

## 2024-06-14 PROCEDURE — 99214 OFFICE O/P EST MOD 30 MIN: CPT | Mod: 95 | Performed by: INTERNAL MEDICINE

## 2024-06-14 RX ORDER — GLIPIZIDE 5 MG/1
5 TABLET, FILM COATED, EXTENDED RELEASE ORAL DAILY
COMMUNITY
Start: 2024-06-14

## 2024-06-14 RX ORDER — TACROLIMUS 1 MG/1
1 CAPSULE ORAL 2 TIMES DAILY
COMMUNITY
Start: 2024-06-14 | End: 2024-07-23

## 2024-06-14 NOTE — NURSING NOTE
Is the patient currently in the state of [place]? YES    Visit mode:VIDEO    If the visit is dropped, the patient can be reconnected by: VIDEO VISIT: Text to cell phone:   Telephone Information:   Mobile [phone]   Mobile [phone]       Will anyone else be joining the visit? NO  (If patient encounters technical issues they should call [phone]    How would you like to obtain your AVS? MyChart    Are changes needed to the allergy or medication list? No    Are refills needed on medications prescribed by this physician? NO    Reason for visit: RECHECK    Luis A PATEL

## 2024-06-14 NOTE — LETTER
6/14/2024      Angelica Romero  [address] [facility] [address]      Dear Colleague,    Thank you for referring your patient, Angelica Romero, to the [facility]PL[facility]. Please see a copy of my visit note below.      CHRONIC TRANSPLANT NEPHROLOGY VISIT    Virtual Visit Details    Type of service:  Video Visit   Video Start Time: 9:30 AM  Video End Time:9:46 AM    Originating Location (pt. Location): Home    Distant Location (provider location):  Off-site  Platform used for Video Visit: [facility]    Assessment & Plan  # DDKT: Trend up   - Baseline Creatinine:  ~ 0.9-1.2   - Proteinuria: Mild (0.5-1.0 grams)   - Date DSA Last Checked: Apr/2020      Latest DSA: No DSA, but history of DSA previously, repeat    - BK Viremia: Not checked recently due to time from transplant   - Kidney Tx Biopsy: No   -Obtain ddcfDNA due to uptrending Scr with inadequate tac levels    -Monthly labs for now until tacrolimus level is stable at goal     # Immunosuppression: Tacrolimus immediate release (goal 4-6) and Mycophenolate mofetil (dose 750 mg every 12 hours)   - Continue with intensive monitoring of immunosuppression for efficacy and toxicity.   - Changes: No    # Infection Prophylaxis:   - PJP: Sulfa/TMP (Bactrim)    # Hypertension: Controlled;  Goal BP: < 130/80   - Changes: No. Continue amlodipine 10mg daily, losartan 50mg daily and metoprolol 50mg bid. If upcoming labs are stable would increase losartan to 100mg daily and decrease amlodipine to 5mg daily     # Diabetes: Borderline control (HbA1c 7-9%)        Last HbA1c: 8.7%     -Currently taking metformin 1g bid and glipizide 5mg daily.    -Consider SGLT2i vs GLP1a    - Management as per primary care.    # Mineral Bone Disorder:   - Secondary renal hyperparathyroidism; PTH level: Not checked recently        On treatment: None  - Vitamin D; level: Not checked recently        On supplement: No  - Calcium; level: Normal        On supplement: No    # Electrolytes:   -  Potassium; level: Low normal        On supplement: No  - Magnesium; level: Not checked recently        On supplement: No  - Bicarbonate; level: Normal        On supplement: No    # Obesity, Class II (BMI = 37kg/m2):    - Recommend weight loss for overall health by increasing exercise and watching caloric intake.    # GERD: Mostly controlled on omeprazole.    # Skin Cancer: New lesions: none   - Discussed sun protection and recommend regular follow up with Dermatology.    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    Return visit: Return in about 1 year (around 6/14/2025).    Qasim Murdock MD    The longitudinal plan of care for kidney transplant was addressed during this visit. Due to the added complexity in care, I will continue to support Angelica Romero in the subsequent management of this condition(s) and with the ongoing continuity of care of this condition(s).     Chief Complaint  Ms. Romero is a 58 year old here for kidney transplant and immunosuppression management.    History of Present Illness  The patient overall feels well. She denies any recent hospitalizations. She denies nausea, vomiting, diarrhea, fever, chills, shortness of breath, chest pain, unintentional weight loss, nights sweats, dysuria, hematuria. She says that her L foot has been numb for the past year and her PCP is aware of that and she has swelling in her L ankle.       Home BP:  120s systolic    Problem List  Patient Active Problem List   Diagnosis     HTN, kidney transplant related     Obesity, unspecified      Kidney replaced by transplant     Immunosuppression (H24)     Aftercare following organ transplant     Secondary renal hyperparathyroidism (H24)     Vitamin D deficiency     Gastroesophageal reflux disease without esophagitis     Latent tuberculosis     Abnormal LFTs     Chronic kidney disease, stage 3 (H)     Hypomagnesemia       Allergies  No Known Allergies    Medications  Current Outpatient Medications   Medication Sig Dispense Refill     glipiZIDE (GLUCOTROL XL) 5 MG 24 hr tablet Take 1 tablet (5 mg) by mouth daily       tacrolimus (GENERIC EQUIVALENT) 1 MG capsule Take 1 capsule (1 mg) by mouth 2 times daily Total dose 1mg in AM and 0.5mg in PM       amLODIPine (NORVASC) 10 MG tablet TAKE ONE TABLET BY MOUTH ONCE DAILY 30 tablet 11     blood glucose monitoring (FREESTYLE LITE) meter device kit See Admin Instructions       blood glucose monitoring (FREESTYLE) lancets        FREESTYLE LITE test strip        losartan (COZAAR) 50 MG tablet TAKE 1 TABLET BY MOUTH AT BEDTIME 90 tablet 3     metFORMIN (GLUCOPHAGE) 500 MG tablet Take 1,000 mg by mouth 2 times daily (with meals)       metoprolol tartrate (LOPRESSOR) 50 MG tablet TAKE ONE TABLET BY MOUTH TWICE A DAY 60 tablet 11     mycophenolate (GENERIC EQUIVALENT) 250 MG capsule Take 3 capsules (750 mg) by mouth 2 times daily 180 capsule 11     omeprazole (PRILOSEC) 20 MG CR capsule TAKE ONE CAPSULE BY MOUTH ONCE DAILY 30 capsule 3     sulfamethoxazole-trimethoprim (BACTRIM) 400-80 MG tablet Take 1 tablet by mouth Every Mon, Wed, Fri Morning 13 tablet 11     tacrolimus (GENERIC) 0.5 MG capsule HOLD FOR FUTURE DOSE CHANGES.  Profile Rx: patient will contact pharmacy when needed       tacrolimus (GENERIC) 1 MG capsule Take 1 capsule (1 mg) by mouth every morning Total dose = 1 mg in the morning and 0.5 mg in the evening 30 capsule 0     No current facility-administered medications for this visit.     Medications Discontinued During This Encounter   Medication Reason      cholecalciferol (VITAMIN D) 1000 UNIT tablet      tacrolimus (GENERIC) 1 MG capsule          Physical Exam  There were no vitals taken for this visit.  GENERAL APPEARANCE: alert and no distress  HENT: no obvious abnormalities on appearance  RESP: breathing appears unremarkable with normal rate, no audible wheezing or cough and no apparent shortness of breath with conversation  MS: extremities normal - no gross deformities noted  SKIN: no apparent rash and normal skin tone  NEURO: speech is clear with no obvious neurological deficits  PSYCH: mentation appears normal and affect normal     Data        Latest Ref Rng & Units 5/7/2024    10:09 AM 2/8/2024    10:59 AM 1/2/2024    10:01 AM   Renal   Na (external) 133 - 144 nmol/L 140  140  142    K (external) 3.4 - 5.1 mmol/L 3.4  3.7  3.9    Cl 96 - 109 mmol/L 102  101  102    Cl (external) 96 - 109 mmol/L 102  101  102    CO2 (external) 21 - 33 mmol/L 24  22  24    BUN (external) 6 - 24 mg/dL 15  14  15    Cr (external) 0.40 - 1.00 mg/dL 1.22  1.14  1.16    Glucose (external) 70 - 99 mg/dL 164  152  152    Ca (external) 8.2 - 10.0 mg/dL 9.9  10.1  10.2          Latest Ref Rng & Units 12/18/2017     2:37 PM 9/19/2017    10:32 AM 9/12/2017    12:51 PM   Bone Health   Phosphorus 2.5 - 4.5 mg/dL 2.1   1.9    Phos (external) 2.6 - 4.7 mg/dL  1.8        Parathyroid Hormone Intact 12 - 72 pg/mL 246   210    Vit D Def 20 - 75 ug/L 28          This result is from an external source.         Latest Ref Rng & Units 5/7/2024    10:09 AM 2/8/2024    10:59 AM 1/2/2024    10:01 AM   Heme   WBC (external) 4.1 - 10.9 x10^3/uL 4.8  5.9  5.8       Hgb (external) 11.7 - 15.5 g/dL 14.0  14.7  14.2       Plt (external) 150 - 450 x10^3/uL 223  231  216       ABSOLUTE NEUTROPHILS (EXTERNAL) 1.9 - 8.1 x10^3/uL 2.7     3.2     3.4       ABSOLUTE LYMPHOCYTES (EXTERNAL) 1.0 - 3.9 x10^3/uL 1.6     2.0     1.7       ABSOLUTE MONOCYTES (EXTERNAL) 0.1 - 0.9 x10^3/uL 0.4     0.4     0.4        ABSOLUTE EOSINOPHILS (EXTERNAL) 0.0 - 0.5 x10^3/uL 0.1     0.2     0.2       ABSOLUTE BASOPHILS (EXTERNAL) 0.0 - 0.2 x10^3/uL 0.1     0.0     0.0           This result is from an external source.         Latest Ref Rng & Units 2/6/2023    10:59 AM 5/7/2019    10:12 AM 5/22/2018    10:29 AM   Liver   AP (external) 45 - 115 IU/L 103     93     92       TBili (external) 0.4 - 1.4 mg/dL 1.1     0.8     0.8       DBili (external) 0.0 - 0.3 mg/dL  0.2     0.2       ALT (external) 17 - 551 IU/L 59     133     178       AST (external) 14 - 41 IU/L 40     97     166       Tot Protein (external) 6.2 - 8.2 g/dL 7.1     7.4     6.9       Albumin (external) 3.3 - 5.0 g/dL 3.6     3.6     3.4           This result is from an external source.         Latest Ref Rng & Units 11/15/2022    12:00 PM 8/15/2022    11:26 AM 2/6/2018    12:00 AM   Pancreas   A1C 4.0 - 6.0 %   5.3       A1C (external) 4.0 - 6.0 % 8.7     8.4            This result is from an external source.         Latest Ref Rng & Units 2/6/2018    12:00 AM 3/30/2017     3:28 PM   Iron studies   Iron ug/dL 96     38    Iron Saturation Index 15 - 46 %  17    Ferritin 26 - 252 ng/mL 3,499     823        This result is from an external source.         Latest Ref Rng & Units 5/7/2019    10:12 AM 3/7/2019    12:27 PM 5/22/2018    10:29 AM   [facility] Txp Virology   CMV DNA Quant Ext Undetected IU/mL Undetected   <137       LOG IU/ML OF CMVQNT (EXTERNAL) log IU/mL   <2.14       BK Quant Result Ext None detected  None detected     BK Quant Spec Ext   Plasma         This result is from an external source.     Failed to redirect to the Timeline version of the [employer] SmartLink.  Recent Labs   Lab Test 02/20/18  1035 04/24/18  1030 05/22/18  1030   DOSTAC 02/19/18 2030 2030 4/23/18 05/21 2100   TACROL 10.0 5.4 8.8     Recent Labs   Lab Test 02/13/17  0718   DOSMPA Not Provided   MPACID [id number]*   MPAG 216.0*         Again, thank you for allowing me to participate in the care of your  patient.        Sincerely,        Qasim Murdock MD

## 2024-06-14 NOTE — PATIENT INSTRUCTIONS
Patient Recommendations:  - I recommend your primary care doctors consider prescribing a GLP1a to help with diabetes and weight loss  -I recommend going a dermatologist once yearly for a skin check  -Monthly labs for now   -I recommend checking with PCP whether you have had a tetanus shot within the last 10 years, pneumonia shot within the last 5 years, Shingrix vaccine, and COVID booster.     Transplant Patient Information  Your Post Transplant Coordinator is: Freda Decker  For non urgent items, we encourage you to contact your coordinator/care team online via [employer]  You and your care team can also contact your transplant coordinator Monday - Friday, 8am - 5pm at [phone] (Option 2 to reach the coordinator or Option 4 to schedule an appointment).  After hours for urgent matters, please call [facility] at [phone].

## 2024-06-14 NOTE — PROGRESS NOTES
CHRONIC TRANSPLANT NEPHROLOGY VISIT    Virtual Visit Details    Type of service:  Video Visit   Video Start Time: 9:30 AM  Video End Time:9:46 AM    Originating Location (pt. Location): Home    Distant Location (provider location):  Off-site  Platform used for Video Visit: Unique    Assessment & Plan   # DDKT: Trend up   - Baseline Creatinine:  ~ 0.9-1.2   - Proteinuria: Mild (0.5-1.0 grams)   - Date DSA Last Checked: Apr/2020      Latest DSA: No DSA, but history of DSA previously, repeat    - BK Viremia: Not checked recently due to time from transplant   - Kidney Tx Biopsy: No   -Obtain ddcfDNA due to uptrending Scr with inadequate tac levels    -Monthly labs for now until tacrolimus level is stable at goal     # Immunosuppression: Tacrolimus immediate release (goal 4-6) and Mycophenolate mofetil (dose 750 mg every 12 hours)   - Continue with intensive monitoring of immunosuppression for efficacy and toxicity.   - Changes: No    # Infection Prophylaxis:   - PJP: Sulfa/TMP (Bactrim)    # Hypertension: Controlled;  Goal BP: < 130/80   - Changes: No. Continue amlodipine 10mg daily, losartan 50mg daily and metoprolol 50mg bid. If upcoming labs are stable would increase losartan to 100mg daily and decrease amlodipine to 5mg daily     # Diabetes: Borderline control (HbA1c 7-9%)        Last HbA1c: 8.7%     -Currently taking metformin 1g bid and glipizide 5mg daily.    -Consider SGLT2i vs GLP1a    - Management as per primary care.    # Mineral Bone Disorder:   - Secondary renal hyperparathyroidism; PTH level: Not checked recently        On treatment: None  - Vitamin D; level: Not checked recently        On supplement: No  - Calcium; level: Normal        On supplement: No    # Electrolytes:   - Potassium; level: Low normal        On supplement: No  - Magnesium; level: Not checked recently        On supplement: No  - Bicarbonate; level: Normal        On supplement: No    # Obesity, Class II (BMI = 37kg/m2):    - Recommend  weight loss for overall health by increasing exercise and watching caloric intake.    # GERD: Mostly controlled on omeprazole.    # Skin Cancer: New lesions: none   - Discussed sun protection and recommend regular follow up with Dermatology.    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    Return visit: Return in about 1 year (around 6/14/2025).    Qasim Murdock MD    The longitudinal plan of care for kidney transplant was addressed during this visit. Due to the added complexity in care, I will continue to support Angelica Romero in the subsequent management of this condition(s) and with the ongoing continuity of care of this condition(s).     Chief Complaint   Ms. Romero is a 58 year old here for kidney transplant and immunosuppression management.    History of Present Illness   The patient overall feels well. She denies any recent hospitalizations. She denies nausea, vomiting, diarrhea, fever, chills, shortness of breath, chest pain, unintentional weight loss, nights sweats, dysuria, hematuria. She says that her L foot has been numb for the past year and her PCP is aware of that and she has swelling in her L ankle.       Home BP:  120s systolic    Problem List   Patient Active Problem List   Diagnosis    HTN, kidney transplant related    Obesity, unspecified    Kidney replaced by transplant    Immunosuppression (H24)    Aftercare following organ transplant    Secondary renal hyperparathyroidism (H24)    Vitamin D deficiency    Gastroesophageal reflux disease without esophagitis    Latent  tuberculosis    Abnormal LFTs    Chronic kidney disease, stage 3 (H)    Hypomagnesemia       Allergies   No Known Allergies    Medications   Current Outpatient Medications   Medication Sig Dispense Refill    glipiZIDE (GLUCOTROL XL) 5 MG 24 hr tablet Take 1 tablet (5 mg) by mouth daily      tacrolimus (GENERIC EQUIVALENT) 1 MG capsule Take 1 capsule (1 mg) by mouth 2 times daily Total dose 1mg in AM and 0.5mg in PM      amLODIPine (NORVASC) 10 MG tablet TAKE ONE TABLET BY MOUTH ONCE DAILY 30 tablet 11    blood glucose monitoring (FREESTYLE LITE) meter device kit See Admin Instructions      blood glucose monitoring (FREESTYLE) lancets       FREESTYLE LITE test strip       losartan (COZAAR) 50 MG tablet TAKE 1 TABLET BY MOUTH AT BEDTIME 90 tablet 3    metFORMIN (GLUCOPHAGE) 500 MG tablet Take 1,000 mg by mouth 2 times daily (with meals)      metoprolol tartrate (LOPRESSOR) 50 MG tablet TAKE ONE TABLET BY MOUTH TWICE A DAY 60 tablet 11    mycophenolate (GENERIC EQUIVALENT) 250 MG capsule Take 3 capsules (750 mg) by mouth 2 times daily 180 capsule 11    omeprazole (PRILOSEC) 20 MG CR capsule TAKE ONE CAPSULE BY MOUTH ONCE DAILY 30 capsule 3    sulfamethoxazole-trimethoprim (BACTRIM) 400-80 MG tablet Take 1 tablet by mouth Every Mon, Wed, Fri Morning 13 tablet 11    tacrolimus (GENERIC) 0.5 MG capsule HOLD FOR FUTURE DOSE CHANGES.  Profile Rx: patient will contact pharmacy when needed      tacrolimus (GENERIC) 1 MG capsule Take 1 capsule (1 mg) by mouth every morning Total dose = 1 mg in the morning and 0.5 mg in the evening 30 capsule 0     No current facility-administered medications for this visit.     Medications Discontinued During This Encounter   Medication Reason    cholecalciferol (VITAMIN D) 1000 UNIT tablet     tacrolimus (GENERIC) 1 MG capsule          Physical Exam   There were no vitals taken for this visit.  GENERAL APPEARANCE: alert and no distress  HENT: no obvious abnormalities on appearance  RESP:  breathing appears unremarkable with normal rate, no audible wheezing or cough and no apparent shortness of breath with conversation  MS: extremities normal - no gross deformities noted  SKIN: no apparent rash and normal skin tone  NEURO: speech is clear with no obvious neurological deficits  PSYCH: mentation appears normal and affect normal     Data         Latest Ref Rng & Units 5/7/2024    10:09 AM 2/8/2024    10:59 AM 1/2/2024    10:01 AM   Renal   Na (external) 133 - 144 nmol/L 140  140  142    K (external) 3.4 - 5.1 mmol/L 3.4  3.7  3.9    Cl 96 - 109 mmol/L 102  101  102    Cl (external) 96 - 109 mmol/L 102  101  102    CO2 (external) 21 - 33 mmol/L 24  22  24    BUN (external) 6 - 24 mg/dL 15  14  15    Cr (external) 0.40 - 1.00 mg/dL 1.22  1.14  1.16    Glucose (external) 70 - 99 mg/dL 164  152  152    Ca (external) 8.2 - 10.0 mg/dL 9.9  10.1  10.2          Latest Ref Rng & Units 12/18/2017     2:37 PM 9/19/2017    10:32 AM 9/12/2017    12:51 PM   Bone Health   Phosphorus 2.5 - 4.5 mg/dL 2.1   1.9    Phos (external) 2.6 - 4.7 mg/dL  1.8        Parathyroid Hormone Intact 12 - 72 pg/mL 246   210    Vit D Def 20 - 75 ug/L 28          This result is from an external source.         Latest Ref Rng & Units 5/7/2024    10:09 AM 2/8/2024    10:59 AM 1/2/2024    10:01 AM   Heme   WBC (external) 4.1 - 10.9 x10^3/uL 4.8  5.9  5.8       Hgb (external) 11.7 - 15.5 g/dL 14.0  14.7  14.2       Plt (external) 150 - 450 x10^3/uL 223  231  216       ABSOLUTE NEUTROPHILS (EXTERNAL) 1.9 - 8.1 x10^3/uL 2.7     3.2     3.4       ABSOLUTE LYMPHOCYTES (EXTERNAL) 1.0 - 3.9 x10^3/uL 1.6     2.0     1.7       ABSOLUTE MONOCYTES (EXTERNAL) 0.1 - 0.9 x10^3/uL 0.4     0.4     0.4       ABSOLUTE EOSINOPHILS (EXTERNAL) 0.0 - 0.5 x10^3/uL 0.1     0.2     0.2       ABSOLUTE BASOPHILS (EXTERNAL) 0.0 - 0.2 x10^3/uL 0.1     0.0     0.0           This result is from an external source.         Latest Ref Rng & Units 2/6/2023    10:59 AM 5/7/2019     10:12 AM 5/22/2018    10:29 AM   Liver   AP (external) 45 - 115 IU/L 103     93     92       TBili (external) 0.4 - 1.4 mg/dL 1.1     0.8     0.8       DBili (external) 0.0 - 0.3 mg/dL  0.2     0.2       ALT (external) 17 - 551 IU/L 59     133     178       AST (external) 14 - 41 IU/L 40     97     166       Tot Protein (external) 6.2 - 8.2 g/dL 7.1     7.4     6.9       Albumin (external) 3.3 - 5.0 g/dL 3.6     3.6     3.4           This result is from an external source.         Latest Ref Rng & Units 11/15/2022    12:00 PM 8/15/2022    11:26 AM 2/6/2018    12:00 AM   Pancreas   A1C 4.0 - 6.0 %   5.3       A1C (external) 4.0 - 6.0 % 8.7     8.4            This result is from an external source.         Latest Ref Rng & Units 2/6/2018    12:00 AM 3/30/2017     3:28 PM   Iron studies   Iron ug/dL 96     38    Iron Saturation Index 15 - 46 %  17    Ferritin 26 - 252 ng/mL 3,499     823        This result is from an external source.         Latest Ref Rng & Units 5/7/2019    10:12 AM 3/7/2019    12:27 PM 5/22/2018    10:29 AM   [facility] Txp Virology   CMV DNA Quant Ext Undetected IU/mL Undetected   <137       LOG IU/ML OF CMVQNT (EXTERNAL) log IU/mL   <2.14       BK Quant Result Ext None detected  None detected     BK Quant Spec Ext   Plasma         This result is from an external source.     Failed to redirect to the Timeline version of the [employer] SmartLink.  Recent Labs   Lab Test 02/20/18  1035 04/24/18  1030 05/22/18  1030   DOSTAC 02/19/18 2030 2030 4/23/18 05/21 2100   TACROL 10.0 5.4 8.8     Recent Labs   Lab Test 02/13/17  0718   DOSMPA Not Provided   MPACID [id number]*   MPAG 216.0*

## 2024-06-18 ENCOUNTER — TELEPHONE (OUTPATIENT)
Dept: TRANSPLANT | Facility: CLINIC | Age: 59
End: 2024-06-18
Payer: COMMERCIAL

## 2024-06-18 DIAGNOSIS — Z94.0 KIDNEY REPLACED BY TRANSPLANT: ICD-10-CM

## 2024-06-18 DIAGNOSIS — Z48.298 AFTERCARE FOLLOWING ORGAN TRANSPLANT: Primary | ICD-10-CM

## 2024-06-18 NOTE — TELEPHONE ENCOUNTER
Spoke to patient and discussed plan. She agrees to getting monthly labs and will schedule first appt later next week.     DSA kits requested to be mailed to patient.     Lab letter sent to patients [facility][note 3].       LPN TASK:   Please submit request for Allosure with Care. Concern for rejection.

## 2024-06-18 NOTE — TELEPHONE ENCOUNTER
----- Message from Qasim Murdock MD sent at 6/14/2024  9:46 AM CDT -----  Regarding: plan  I am concerned about her creatinine trend and proteinuria along with low tac levels.     Please order monthly labs for now at [facility] in [place] until tac is stable at goal. With these next labs she needs a DSA and allosure, A1c, mag, UPCr, microalbumin.     If Scr stable would increase losartan and decrease amlodipine.     Thanks,  Qasim

## 2024-06-18 NOTE — LETTER
PHYSICIAN ORDERS      DATE & TIME ISSUED:  24 3:23 PM    PATIENT NAME: Angelica Romero   : 1965     [facility] MR# [if applicable]: [id number]     DIAGNOSIS:  Kidney Transplant, proteinuria  ICD-10 CODE: Z94.0, R80.9    Please draw the following labs in 1-2 weeks:  -Tacrolimus drug level  -CBC  -BMP  -Hemoglobin A1c  -Magnesium  -Urine Protein/Creatinine Ratio    -Albumin Random Urine Quantitative (aka microalbumin)  -Allosure (patient to bring kit)  -DSA/PRA (kit provided by patient)    Please draw 20ml of blood in red top (plain) tube for Antileukocyte Antibody (ALA / PRA).  Label tubes with the patient s name, complete lab slip.    Mailers, lab slips with instructions are sent to patient separately.  Call the Outreach Lab at [phone] to reorder mailers.  Mail blood to (this address is also on the mailers):     [facility]  [facility]  [address] [place] [address]  Please draw the following labs once monthly:  Tacrolimus drug level  CBC  BMP        Any questions please call: [phone]    Please fax each result same day as resulted/available    Critical lab results page [phone]  Please fax lab results to [phone].      Qasim Murdock MD  .

## 2024-06-26 NOTE — TELEPHONE ENCOUNTER
Voicemail left for pt to see if she has received a call/kit from Allosure yet. Requested return call.

## 2024-07-01 ENCOUNTER — LAB (OUTPATIENT)
Dept: LAB | Facility: CLINIC | Age: 59
End: 2024-07-01
Payer: COMMERCIAL

## 2024-07-01 DIAGNOSIS — Z94.0 KIDNEY REPLACED BY TRANSPLANT: ICD-10-CM

## 2024-07-01 DIAGNOSIS — Z48.298 AFTERCARE FOLLOWING ORGAN TRANSPLANT: ICD-10-CM

## 2024-07-01 PROCEDURE — 86833 HLA CLASS II HIGH DEFIN QUAL: CPT

## 2024-07-01 PROCEDURE — 86832 HLA CLASS I HIGH DEFIN QUAL: CPT

## 2024-07-03 ENCOUNTER — TELEPHONE (OUTPATIENT)
Dept: TRANSPLANT | Facility: CLINIC | Age: 59
End: 2024-07-03
Payer: COMMERCIAL

## 2024-07-03 DIAGNOSIS — E83.42 HYPOMAGNESEMIA: Primary | ICD-10-CM

## 2024-07-03 RX ORDER — MAGNESIUM OXIDE 400 MG/1
400 TABLET ORAL
Qty: 90 TABLET | Refills: 3 | Status: SHIPPED | OUTPATIENT
Start: 2024-07-03

## 2024-07-03 NOTE — TELEPHONE ENCOUNTER
Spoke to pt and reviewed her labs. Creatinine, UPCR, A1c all improved. Pt very happy to hear this. Creatinine at baseline. Confirmed with her that DSA and Allosure were drawn so results pending.     She is agreeable to starting mag supplement and script sent to pharmacy.

## 2024-07-03 NOTE — TELEPHONE ENCOUNTER
----- Message from Bill Tejeda sent at 7/2/2024  9:26 PM CDT -----  Yes, magnesium oxide 400 mg daily with lunch.  ----- Message -----  From: Freda Decker RN  Sent: 7/2/2024   2:29 PM CDT  To: Bill Tejeda MD    Creatinine back to baseline but magnesium is low. Do you recommend she start any supplement for level of 1.3?

## 2024-07-06 ENCOUNTER — HEALTH MAINTENANCE LETTER (OUTPATIENT)
Age: 59
End: 2024-07-06

## 2024-07-11 LAB
DONOR IDENTIFICATION: NORMAL
DSA COMMENTS: NORMAL
DSA PRESENT: NO
DSA TEST METHOD: NORMAL
ORGAN: NORMAL
SA 1  COMMENTS: NORMAL
SA 1 CELL: NORMAL
SA 1 TEST METHOD: NORMAL
SA 2 CELL: NORMAL
SA 2 COMMENTS: NORMAL
SA 2 TEST METHOD: NORMAL
SA1 HI RISK ABY: NORMAL
SA1 MOD RISK ABY: NORMAL
SA2 HI RISK ABY: NORMAL
SA2 MOD RISK ABY: NORMAL
UNACCEPTABLE ANTIGENS: NORMAL
UNOS CPRA: 78

## 2024-07-23 ENCOUNTER — TELEPHONE (OUTPATIENT)
Dept: TRANSPLANT | Facility: CLINIC | Age: 59
End: 2024-07-23
Payer: COMMERCIAL

## 2024-07-23 DIAGNOSIS — Z22.7 LATENT TUBERCULOSIS BY BLOOD TEST: ICD-10-CM

## 2024-07-23 DIAGNOSIS — Z48.298 AFTERCARE FOLLOWING ORGAN TRANSPLANT: ICD-10-CM

## 2024-07-23 DIAGNOSIS — Z94.0 KIDNEY REPLACED BY TRANSPLANT: ICD-10-CM

## 2024-07-23 DIAGNOSIS — Z94.0 KIDNEY TRANSPLANTED: ICD-10-CM

## 2024-07-23 DIAGNOSIS — Z94.0 DECEASED-DONOR KIDNEY TRANSPLANT: ICD-10-CM

## 2024-07-23 RX ORDER — TACROLIMUS 1 MG/1
1 CAPSULE ORAL EVERY MORNING
Qty: 90 CAPSULE | Refills: 3 | Status: SHIPPED | OUTPATIENT
Start: 2024-07-23

## 2024-07-23 RX ORDER — TACROLIMUS 0.5 MG/1
0.5 CAPSULE ORAL EVERY EVENING
Qty: 90 CAPSULE | Refills: 3 | Status: SHIPPED | OUTPATIENT
Start: 2024-07-23

## 2024-07-23 NOTE — TELEPHONE ENCOUNTER
Call placed to patient to let her know I have sent a 1 year supply of both tacrolimus capsule strengths so her pharmacy. Confirmed her current dosing of 1mg in AM and 0.5mg in PM. Pt appreciated call and will contact me if any issues with the prescription.

## 2024-07-23 NOTE — TELEPHONE ENCOUNTER
General  Route to LPN    Reason for call: Pt needs a refil on her tacro 1mg and .05mg to [facility]     Call back needed? Yes    Return Call Needed  Same as documented in contacts section  When to return call?: Greater than one day: Route standard priority

## 2024-08-06 ENCOUNTER — TRANSFERRED RECORDS (OUTPATIENT)
Dept: HEALTH INFORMATION MANAGEMENT | Facility: CLINIC | Age: 59
End: 2024-08-06

## 2024-08-08 ENCOUNTER — TRANSFERRED RECORDS (OUTPATIENT)
Dept: HEALTH INFORMATION MANAGEMENT | Facility: CLINIC | Age: 59
End: 2024-08-08

## 2024-10-16 ENCOUNTER — TRANSFERRED RECORDS (OUTPATIENT)
Dept: HEALTH INFORMATION MANAGEMENT | Facility: CLINIC | Age: 59
End: 2024-10-16

## 2024-11-23 ENCOUNTER — HEALTH MAINTENANCE LETTER (OUTPATIENT)
Age: 59
End: 2024-11-23

## 2025-03-08 ENCOUNTER — HEALTH MAINTENANCE LETTER (OUTPATIENT)
Age: 60
End: 2025-03-08

## 2025-04-13 DIAGNOSIS — Z48.298 AFTERCARE FOLLOWING ORGAN TRANSPLANT: ICD-10-CM

## 2025-04-13 DIAGNOSIS — I10 ESSENTIAL HYPERTENSION: ICD-10-CM

## 2025-04-13 DIAGNOSIS — Z94.0 KIDNEY REPLACED BY TRANSPLANT: ICD-10-CM

## 2025-04-13 DIAGNOSIS — Z94.0 DECEASED-DONOR KIDNEY TRANSPLANT: ICD-10-CM

## 2025-04-14 RX ORDER — MYCOPHENOLATE MOFETIL 250 MG/1
CAPSULE ORAL
Qty: 180 CAPSULE | Refills: 0 | Status: SHIPPED | OUTPATIENT
Start: 2025-04-14

## 2025-05-13 ENCOUNTER — TELEPHONE (OUTPATIENT)
Dept: TRANSPLANT | Facility: CLINIC | Age: 60
End: 2025-05-13
Payer: COMMERCIAL

## 2025-05-13 DIAGNOSIS — Z94.0 HTN, KIDNEY TRANSPLANT RELATED: ICD-10-CM

## 2025-05-13 DIAGNOSIS — Z48.298 AFTERCARE FOLLOWING ORGAN TRANSPLANT: ICD-10-CM

## 2025-05-13 DIAGNOSIS — I15.1 HTN, KIDNEY TRANSPLANT RELATED: ICD-10-CM

## 2025-05-13 DIAGNOSIS — Z94.0 KIDNEY REPLACED BY TRANSPLANT: Primary | ICD-10-CM

## 2025-05-13 DIAGNOSIS — I10 ESSENTIAL HYPERTENSION: ICD-10-CM

## 2025-05-13 DIAGNOSIS — Z94.0 DECEASED-DONOR KIDNEY TRANSPLANT: ICD-10-CM

## 2025-05-13 DIAGNOSIS — Z94.0 KIDNEY REPLACED BY TRANSPLANT: ICD-10-CM

## 2025-05-13 RX ORDER — MYCOPHENOLATE MOFETIL 250 MG/1
CAPSULE ORAL
Qty: 180 CAPSULE | Refills: 0 | Status: SHIPPED | OUTPATIENT
Start: 2025-05-13

## 2025-05-13 NOTE — LETTER
PHYSICIAN ORDERS      DATE & TIME ISSUED: 25 9:49 AM    PATIENT NAME: Angelica Romero   : 1965     [facility] MR# [if applicable]: [id number]     DIAGNOSIS:  Kidney Transplant  ICD-10 CODE: Z94.0     Please draw the following labs once every 3 months:  -Tacrolimus drug level  -CBC  -BMP    Any questions please call: [phone]    Please fax each result same day as resulted/available    Critical lab results page [phone]  Please fax lab results to [phone].      Alison Lainez M.D.   of Medicine  Nephrology and Hypertension  Department of Medicine  [facility]    Call  with any questions. Thanks!

## 2025-05-13 NOTE — LETTER
PHYSICIAN ORDERS      DATE & TIME ISSUED: 25 9:49 AM    PATIENT NAME: Angelica Romero   : 1965     [facility] MR# [if applicable]: [id number]     DIAGNOSIS:  Kidney Transplant  ICD-10 CODE: Z94.0     UPDATED/ADDENDUM TO PREVIOUSLY FAXED ORDERS.     Please draw the following labs once every 3 months:  -Tacrolimus drug level  -CBC  -BMP    Please draw once every 6 months:  -Urine Protein/Creatinine Ratio      Any questions please call: [phone]    Please fax each result same day as resulted/available    Critical lab results page [phone]  Please fax lab results to [phone].      Alison Lainez M.D.   of Medicine  Nephrology and Hypertension  Department of Medicine  [facility]    Call  with any questions. Thanks!

## 2025-05-13 NOTE — TELEPHONE ENCOUNTER
Spoke to pt regarding labs overdue. Pt discussed issues with her [facility] over the winter. She will call them now to schedule lab visit. Orders updated.

## 2025-05-15 ENCOUNTER — RESULTS FOLLOW-UP (OUTPATIENT)
Dept: TRANSPLANT | Facility: CLINIC | Age: 60
End: 2025-05-15

## 2025-05-15 DIAGNOSIS — Z94.0 HTN, KIDNEY TRANSPLANT RELATED: ICD-10-CM

## 2025-05-15 DIAGNOSIS — I15.1 HTN, KIDNEY TRANSPLANT RELATED: ICD-10-CM

## 2025-05-19 ENCOUNTER — RESULTS FOLLOW-UP (OUTPATIENT)
Dept: MULTI SPECIALTY CLINIC | Facility: CLINIC | Age: 60
End: 2025-05-19

## 2025-05-19 NOTE — LETTER
PHYSICIAN ORDERS      DATE & TIME ISSUED: 25   PATIENT NAME: Angelica Romero   : 1965     [facility] MR# [if applicable]: [id number]     DIAGNOSIS:  Kidney Transplant  ICD-10 CODE: Z94.0     Please draw the following labs again within 1-2 weeks:  -Tacrolimus drug level  -CBC  -BMP    THEN RESUME  Please draw the following labs once every 3 months:  -Tacrolimus drug level  -CBC  -BMP    Please draw once every 6 months:  -Urine Protein/Creatinine Ratio      Any questions please call: [phone]    Please fax each result same day as resulted/available    Critical lab results page [phone]  Please fax lab results to [phone].      Alison Lainez M.D.   of Medicine  Nephrology and Hypertension  Department of Medicine  [facility]

## 2025-05-19 NOTE — TELEPHONE ENCOUNTER
Call placed to patient to discuss elevated tacrolimus level.   Pt states she forgot to hold her tacrolimus that morning and took before her blood draw.     Pt will repeat in 1-2 weeks. Orders sent.

## 2025-06-18 DIAGNOSIS — Z48.298 AFTERCARE FOLLOWING ORGAN TRANSPLANT: ICD-10-CM

## 2025-06-18 DIAGNOSIS — I10 ESSENTIAL HYPERTENSION: ICD-10-CM

## 2025-06-18 DIAGNOSIS — Z94.0 KIDNEY REPLACED BY TRANSPLANT: ICD-10-CM

## 2025-06-18 DIAGNOSIS — Z22.7 LATENT TUBERCULOSIS BY BLOOD TEST: ICD-10-CM

## 2025-06-18 DIAGNOSIS — Z94.0 DECEASED-DONOR KIDNEY TRANSPLANT: ICD-10-CM

## 2025-06-18 DIAGNOSIS — Z94.0 KIDNEY TRANSPLANTED: ICD-10-CM

## 2025-06-18 RX ORDER — TACROLIMUS 0.5 MG/1
CAPSULE ORAL
Qty: 90 CAPSULE | Refills: 0 | Status: SHIPPED | OUTPATIENT
Start: 2025-06-18

## 2025-06-18 RX ORDER — MYCOPHENOLATE MOFETIL 250 MG/1
CAPSULE ORAL
Qty: 180 CAPSULE | Refills: 0 | Status: SHIPPED | OUTPATIENT
Start: 2025-06-18

## 2025-06-18 RX ORDER — TACROLIMUS 1 MG/1
CAPSULE ORAL
Qty: 90 CAPSULE | Refills: 0 | Status: SHIPPED | OUTPATIENT
Start: 2025-06-18

## 2025-06-18 RX ORDER — SULFAMETHOXAZOLE AND TRIMETHOPRIM 400; 80 MG/1; MG/1
TABLET ORAL
Qty: 13 TABLET | Refills: 0 | Status: SHIPPED | OUTPATIENT
Start: 2025-06-18

## 2025-06-19 ENCOUNTER — RESULTS FOLLOW-UP (OUTPATIENT)
Dept: TRANSPLANT | Facility: CLINIC | Age: 60
End: 2025-06-19

## 2025-06-19 DIAGNOSIS — I15.1 HTN, KIDNEY TRANSPLANT RELATED: ICD-10-CM

## 2025-06-19 DIAGNOSIS — Z94.0 HTN, KIDNEY TRANSPLANT RELATED: ICD-10-CM

## 2025-06-22 ENCOUNTER — HEALTH MAINTENANCE LETTER (OUTPATIENT)
Age: 60
End: 2025-06-22

## 2025-07-13 ENCOUNTER — HEALTH MAINTENANCE LETTER (OUTPATIENT)
Age: 60
End: 2025-07-13

## 2025-07-15 DIAGNOSIS — Z94.0 DECEASED-DONOR KIDNEY TRANSPLANT: ICD-10-CM

## 2025-07-15 DIAGNOSIS — I10 ESSENTIAL HYPERTENSION: ICD-10-CM

## 2025-07-15 DIAGNOSIS — Z48.298 AFTERCARE FOLLOWING ORGAN TRANSPLANT: ICD-10-CM

## 2025-07-15 DIAGNOSIS — Z94.0 KIDNEY REPLACED BY TRANSPLANT: Primary | ICD-10-CM

## 2025-07-15 RX ORDER — MYCOPHENOLATE MOFETIL 250 MG/1
750 CAPSULE ORAL 2 TIMES DAILY
Qty: 180 CAPSULE | Refills: 5 | Status: SHIPPED | OUTPATIENT
Start: 2025-07-15

## 2025-07-21 DIAGNOSIS — Z94.0 HTN, KIDNEY TRANSPLANT RELATED: ICD-10-CM

## 2025-07-21 DIAGNOSIS — I15.1 HTN, KIDNEY TRANSPLANT RELATED: ICD-10-CM

## 2025-07-21 DIAGNOSIS — Z94.0 DECEASED-DONOR KIDNEY TRANSPLANT: ICD-10-CM

## 2025-07-21 DIAGNOSIS — Z94.0 KIDNEY REPLACED BY TRANSPLANT: Primary | ICD-10-CM

## 2025-07-21 DIAGNOSIS — I10 ESSENTIAL HYPERTENSION: ICD-10-CM

## 2025-07-21 DIAGNOSIS — Z48.298 AFTERCARE FOLLOWING ORGAN TRANSPLANT: ICD-10-CM

## 2025-07-21 RX ORDER — MYCOPHENOLATE MOFETIL 250 MG/1
750 CAPSULE ORAL 2 TIMES DAILY
Qty: 180 CAPSULE | Refills: 0 | Status: SHIPPED | OUTPATIENT
Start: 2025-07-21

## 2025-07-21 RX ORDER — SULFAMETHOXAZOLE AND TRIMETHOPRIM 400; 80 MG/1; MG/1
TABLET ORAL
Qty: 13 TABLET | Refills: 2 | Status: SHIPPED | OUTPATIENT
Start: 2025-07-21

## 2025-08-04 DIAGNOSIS — I15.1 HTN, KIDNEY TRANSPLANT RELATED: ICD-10-CM

## 2025-08-04 DIAGNOSIS — Z94.0 HTN, KIDNEY TRANSPLANT RELATED: ICD-10-CM

## (undated) DEVICE — SU PDS II 5-0 RB-1 27" Z303H

## (undated) DEVICE — TUBING IRRIG CYSTO/BLADDER SET 81" LF 2C4040

## (undated) DEVICE — SU PROLENE 5-0 RB-1DA 36"  8556H

## (undated) DEVICE — SU VICRYL 2-0 CT-1 27" UND J259H

## (undated) DEVICE — SU PDS II 6-0 RB-2DA 30" Z149H

## (undated) DEVICE — ESU GROUND PAD ADULT W/CORD E7507

## (undated) DEVICE — SU PDS II 4-0 RB-1 27" Z304H

## (undated) DEVICE — PREP CHLORAPREP 26ML TINTED ORANGE  260815

## (undated) DEVICE — SU SILK 3-0 SH CR 8X18" C013D

## (undated) DEVICE — SUCTION MANIFOLD DORNOCH ULTRA CART UL-CL500

## (undated) DEVICE — LINEN TOWEL PACK X30 5481

## (undated) DEVICE — SOL NACL 0.9% IRRIG 1000ML BOTTLE 2F7124

## (undated) DEVICE — INSERT FOGARTY 33MM TRACTION HYDRAJAW HYDRA33

## (undated) DEVICE — DECANTER BAG 2002S

## (undated) DEVICE — SU PROLENE 1 CTX 30" 8455H

## (undated) DEVICE — CATH FOLEY 3WAY 18FR 30ML LATEX 0167SI18

## (undated) DEVICE — DEVICE CATH STABILIZATION STATLOCK FOLEY 3-WAY FOL0105

## (undated) DEVICE — SU PROLENE 4-0 SHDA 36" 8521H

## (undated) DEVICE — CLIP HORIZON LG ORANGE 004200

## (undated) DEVICE — CANNULA VESSEL DLP  30001

## (undated) DEVICE — SU SILK 2-0 TIE 12X30" A305H

## (undated) DEVICE — WIPES FOLEY CARE SURESTEP PROVON DFC100

## (undated) DEVICE — SOL NACL 0.9% INJ 1000ML BAG 07983-09

## (undated) DEVICE — SU PROLENE 6-0 RB-2DA 30" 8711H

## (undated) DEVICE — SU SILK 3-0 SH 30" K832H

## (undated) DEVICE — SU SILK 0 TIE 6X30" A306H

## (undated) DEVICE — SU PDS II 0 TP-1 60" Z991G

## (undated) DEVICE — SU SILK 1 TIE 6X30" A307H

## (undated) DEVICE — SU SILK 4-0 TIE 12X30" A303H

## (undated) DEVICE — SU VICRYL 3-0 SH 27" J316H

## (undated) DEVICE — Device

## (undated) DEVICE — LINEN TOWEL PACK X6 WHITE 5487

## (undated) DEVICE — CATH PLUG W/CAP 000076

## (undated) DEVICE — DRSG MEDIPORE 3 1/2X13 3/4" 3573

## (undated) DEVICE — SU PROLENE 5-0 RB-2DA 30" 8710H

## (undated) DEVICE — SU SILK 3-0 TIE 12X30" A304H

## (undated) DEVICE — STPL SKIN 35W 059037

## (undated) DEVICE — SYR 01ML 27GA 0.5" NDL TBC 309623

## (undated) DEVICE — DRAPE SLUSH/WARMER 66X44" ORS-320

## (undated) RX ORDER — HYDROMORPHONE HYDROCHLORIDE 1 MG/ML
INJECTION, SOLUTION INTRAMUSCULAR; INTRAVENOUS; SUBCUTANEOUS
Status: DISPENSED
Start: 2017-02-08

## (undated) RX ORDER — SODIUM CHLORIDE 450 MG/100ML
INJECTION, SOLUTION INTRAVENOUS
Status: DISPENSED
Start: 2017-02-08

## (undated) RX ORDER — SODIUM CHLORIDE 9 MG/ML
INJECTION, SOLUTION INTRAVENOUS
Status: DISPENSED
Start: 2017-02-08

## (undated) RX ORDER — FUROSEMIDE 10 MG/ML
INJECTION INTRAMUSCULAR; INTRAVENOUS
Status: DISPENSED
Start: 2017-02-08